# Patient Record
Sex: MALE | Race: WHITE | NOT HISPANIC OR LATINO | Employment: FULL TIME | URBAN - NONMETROPOLITAN AREA
[De-identification: names, ages, dates, MRNs, and addresses within clinical notes are randomized per-mention and may not be internally consistent; named-entity substitution may affect disease eponyms.]

---

## 2017-03-02 ENCOUNTER — APPOINTMENT (OUTPATIENT)
Dept: OCCUPATIONAL MEDICINE | Facility: OTHER | Age: 47
End: 2017-03-02

## 2017-03-02 PROCEDURE — 99199 UNLISTED SPECIAL SVC PX/RPRT: CPT

## 2017-03-02 PROCEDURE — 92552 PURE TONE AUDIOMETRY AIR: CPT

## 2020-02-26 NOTE — PROGRESS NOTES
"Subjective     Branden Becker is a 49 year old male who presents to clinic today for the following health issues:    HPI   New Patient/Transfer of Care  Pt previously seen prn only with [facility]. He has been very healthy. History of GERD on PPI for this, see below. He has no acute concerns today.     GERD/Heartburn      Duration: Chronic for many years    Description (location/character/radiation): burning, belching    Intensity:  moderate    Accompanying signs and symptoms:  food getting stuck: no   nausea/vomiting/blood: no   abdominal pain: no   black/tarry or bloody stools: no :    History (similar episodes/previous evaluation): chronic    Precipitating or alleviating factors:  worse with fatty foods and spicy foods.  current NSAID/Aspirin use: no     Therapies tried and outcome: Omeprazole (Prilosec)    Reviewed and updated as needed this visit by Provider  Tobacco  Allergies  Meds  Problems  Med Hx  Surg Hx  Fam Hx  Soc Hx          Review of Systems   ROS COMP: Constitutional, HEENT, cardiovascular, pulmonary, gi and gu systems are negative, except as otherwise noted.      Objective    /76 (BP Location: Left arm, Patient Position: Sitting, Cuff Size: Adult Large)   Pulse 65   Temp 97.7  F (36.5  C) (Tympanic)   Ht 1.778 m (5' 10\")   Wt 98.9 kg (218 lb)   SpO2 97%   BMI 31.28 kg/m    Body mass index is 31.28 kg/m .  Physical Exam   GENERAL: healthy, alert and no distress  NECK: no adenopathy, no asymmetry, masses, or scars and thyroid normal to palpation  RESP: lungs clear to auscultation - no rales, rhonchi or wheezes  CV: regular rates and rhythm, normal S1 S2, no S3 or S4, no murmur, click or rub,  no peripheral edema  ABDOMEN: soft, nontender, no hepatosplenomegaly, no masses and bowel sounds normal  MS: no gross musculoskeletal defects noted, no edema    Diagnostic Test Results:  Labs reviewed in Epic  No results found for any visits on 02/28/20.        Assessment & Plan "     Gastroesophageal reflux disease without esophagitis  Continue PPI as dosed for now. Consider dose reduction to 20mg daily. Follow up for physical.       Return in about 2 years (around 2/28/2022) for GERD, Physical.    Vanessa Hastings MD  [facility] [place]

## 2020-02-28 ENCOUNTER — OFFICE VISIT (OUTPATIENT)
Dept: FAMILY MEDICINE | Facility: OTHER | Age: 50
End: 2020-02-28
Attending: FAMILY MEDICINE
Payer: COMMERCIAL

## 2020-02-28 VITALS
DIASTOLIC BLOOD PRESSURE: 76 MMHG | OXYGEN SATURATION: 97 % | TEMPERATURE: 97.7 F | BODY MASS INDEX: 31.21 KG/M2 | HEART RATE: 65 BPM | HEIGHT: 70 IN | SYSTOLIC BLOOD PRESSURE: 122 MMHG | WEIGHT: 218 LBS

## 2020-02-28 DIAGNOSIS — K21.9 GASTROESOPHAGEAL REFLUX DISEASE WITHOUT ESOPHAGITIS: Primary | ICD-10-CM

## 2020-02-28 DIAGNOSIS — Z23 NEED FOR PROPHYLACTIC VACCINATION AND INOCULATION AGAINST INFLUENZA: ICD-10-CM

## 2020-02-28 PROBLEM — Z91.89 FRAMINGHAM CARDIAC RISK <10% IN NEXT 10 YEARS: Status: ACTIVE | Noted: 2019-03-28

## 2020-02-28 PROCEDURE — 90686 IIV4 VACC NO PRSV 0.5 ML IM: CPT | Performed by: FAMILY MEDICINE

## 2020-02-28 PROCEDURE — 99202 OFFICE O/P NEW SF 15 MIN: CPT | Mod: 25 | Performed by: FAMILY MEDICINE

## 2020-02-28 PROCEDURE — 90471 IMMUNIZATION ADMIN: CPT | Performed by: FAMILY MEDICINE

## 2020-02-28 RX ORDER — OMEPRAZOLE 40 MG/1
40 CAPSULE, DELAYED RELEASE ORAL
COMMUNITY
Start: 2019-03-28 | End: 2020-04-17

## 2020-02-28 ASSESSMENT — ANXIETY QUESTIONNAIRES
1. FEELING NERVOUS, ANXIOUS, OR ON EDGE: SEVERAL DAYS
2. NOT BEING ABLE TO STOP OR CONTROL WORRYING: NOT AT ALL
5. BEING SO RESTLESS THAT IT IS HARD TO SIT STILL: NOT AT ALL
3. WORRYING TOO MUCH ABOUT DIFFERENT THINGS: SEVERAL DAYS
GAD7 TOTAL SCORE: 3
4. TROUBLE RELAXING: NOT AT ALL
7. FEELING AFRAID AS IF SOMETHING AWFUL MIGHT HAPPEN: NOT AT ALL
6. BECOMING EASILY ANNOYED OR IRRITABLE: SEVERAL DAYS

## 2020-02-28 ASSESSMENT — PAIN SCALES - GENERAL: PAINLEVEL: NO PAIN (0)

## 2020-02-28 ASSESSMENT — PATIENT HEALTH QUESTIONNAIRE - PHQ9: SUM OF ALL RESPONSES TO PHQ QUESTIONS 1-9: 1

## 2020-02-28 ASSESSMENT — MIFFLIN-ST. JEOR: SCORE: 1865.09

## 2020-02-28 NOTE — NURSING NOTE
"Chief Complaint   Patient presents with     Establish Care       Initial /76 (BP Location: Left arm, Patient Position: Sitting, Cuff Size: Adult Large)   Pulse 65   Temp 97.7  F (36.5  C) (Tympanic)   Ht 1.778 m (5' 10\")   Wt 98.9 kg (218 lb)   SpO2 97%   BMI 31.28 kg/m   Estimated body mass index is 31.28 kg/m  as calculated from the following:    Height as of this encounter: 1.778 m (5' 10\").    Weight as of this encounter: 98.9 kg (218 lb).  Medication Reconciliation: complete  Ry Raymundo LPN  "

## 2020-02-29 ASSESSMENT — ANXIETY QUESTIONNAIRES: GAD7 TOTAL SCORE: 3

## 2020-04-17 DIAGNOSIS — K21.9 GASTROESOPHAGEAL REFLUX DISEASE WITHOUT ESOPHAGITIS: Primary | ICD-10-CM

## 2020-04-17 RX ORDER — OMEPRAZOLE 40 MG/1
40 CAPSULE, DELAYED RELEASE ORAL DAILY
Qty: 90 CAPSULE | Refills: 3 | Status: SHIPPED | OUTPATIENT
Start: 2020-04-17 | End: 2021-04-13

## 2020-04-17 NOTE — TELEPHONE ENCOUNTER
Omeprazole  Last Written Prescription Date: pt reported  Last Fill Quantity: na # of Refills: na  Last Office Visit: 2/28/20

## 2020-07-07 ENCOUNTER — HOSPITAL ENCOUNTER (EMERGENCY)
Facility: HOSPITAL | Age: 50
Discharge: HOME | End: 2020-07-08
Attending: INTERNAL MEDICINE | Admitting: INTERNAL MEDICINE
Payer: COMMERCIAL

## 2020-07-07 DIAGNOSIS — S51.812A LACERATION OF LEFT FOREARM, INITIAL ENCOUNTER: ICD-10-CM

## 2020-07-07 PROCEDURE — 25000128 H RX IP 250 OP 636: Performed by: INTERNAL MEDICINE

## 2020-07-07 PROCEDURE — 90471 IMMUNIZATION ADMIN: CPT

## 2020-07-07 PROCEDURE — 90715 TDAP VACCINE 7 YRS/> IM: CPT | Performed by: INTERNAL MEDICINE

## 2020-07-07 PROCEDURE — 12001 RPR S/N/AX/GEN/TRNK 2.5CM/<: CPT | Performed by: INTERNAL MEDICINE

## 2020-07-07 PROCEDURE — 99282 EMERGENCY DEPT VISIT SF MDM: CPT | Mod: 25

## 2020-07-07 PROCEDURE — 12001 RPR S/N/AX/GEN/TRNK 2.5CM/<: CPT

## 2020-07-07 RX ADMIN — CLOSTRIDIUM TETANI TOXOID ANTIGEN (FORMALDEHYDE INACTIVATED), CORYNEBACTERIUM DIPHTHERIAE TOXOID ANTIGEN (FORMALDEHYDE INACTIVATED), BORDETELLA PERTUSSIS TOXOID ANTIGEN (GLUTARALDEHYDE INACTIVATED), BORDETELLA PERTUSSIS FILAMENTOUS HEMAGGLUTININ ANTIGEN (FORMALDEHYDE INACTIVATED), BORDETELLA PERTUSSIS PERTACTIN ANTIGEN, AND BORDETELLA PERTUSSIS FIMBRIAE 2/3 ANTIGEN 0.5 ML: 5; 2; 2.5; 5; 3; 5 INJECTION, SUSPENSION INTRAMUSCULAR at 23:56

## 2020-07-07 NOTE — ED AVS SNAPSHOT
[facility]  [address]  [place] [address]  Phone:  [phone]                                    Branden Becker   MRN: [id number]    Department:  [facility]   Date of Visit:  7/7/2020           After Visit Summary Signature Page    I have received my discharge instructions, and my questions have been answered. I have discussed any challenges I see with this plan with the nurse or doctor.    ..........................................................................................................................................  Patient/Patient Representative Signature      ..........................................................................................................................................  Patient Representative Print Name and Relationship to Patient    ..................................................               ................................................  Date                                   Time    ..........................................................................................................................................  Reviewed by Signature/Title    ...................................................              ..............................................  Date                                               Time          22EPIC Rev 08/18

## 2020-07-08 VITALS
SYSTOLIC BLOOD PRESSURE: 144 MMHG | TEMPERATURE: 97.1 F | OXYGEN SATURATION: 97 % | DIASTOLIC BLOOD PRESSURE: 100 MMHG | RESPIRATION RATE: 16 BRPM

## 2020-07-08 ASSESSMENT — ENCOUNTER SYMPTOMS
SHORTNESS OF BREATH: 0
EYE REDNESS: 0
ARTHRALGIAS: 0
FEVER: 0
HEADACHES: 0
ABDOMINAL PAIN: 0
COLOR CHANGE: 0
CONFUSION: 0
NECK STIFFNESS: 0
DIFFICULTY URINATING: 0

## 2020-07-08 NOTE — ED NOTES
After provider places sutures, are is cleaned, covered with 4x4 and dressing. Discharge instructions gone over with patient and he states understanding. Patient is then discharged in stable condition, ambulatory, with daughter.

## 2020-07-08 NOTE — ED NOTES
States that about 45 minutes ago he was loading a  when something fell onto lower level and he reached to grab it and got laceration from knife that was sitting in utensil tray.  States minimal bleeding at home. Does have about 1 inch laceration to left forearm with no bleeding currently. 2x2 placed with coban to prevent bleeding until provider goes in to see patient.

## 2020-07-09 NOTE — ED PROVIDER NOTES
History     Chief Complaint   Patient presents with     Laceration     lt lower arm laceration     The history is provided by the patient.   Laceration   Location:  Shoulder/arm  Shoulder/arm laceration location:  L forearm  Depth:  Through dermis  Laceration mechanism:  Knife  Pain details:     Quality:  Aching    Severity:  Mild  Tetanus status:  Out of date  Associated symptoms: no fever          Allergies:  No Known Allergies    Problem List:    Patient Active Problem List    Diagnosis Date Noted     [place] cardiac risk <10% in next 10 years 2019     Priority: Medium     3/28/19:  Age 48; smoking: no; diabetes: no; hypertension: no; systolic blood pressure:  110; date of lipid:  17; HDL:  44; total cholesterol:  174; statin: no; RISK:  2%.       Ankle pain 2015     Priority: Medium     Tenosynovitis 10/31/2012     Priority: Medium     Gastroesophageal reflux disease 2007     Priority: Medium        Past Medical History:    Past Medical History:   Diagnosis Date     GERD (gastroesophageal reflux disease)        Past Surgical History:    No past surgical history on file.    Family History:    Family History   Problem Relation Age of Onset     Obesity Mother        Social History:  Marital Status:   [2]  Social History     Tobacco Use     Smoking status: Former Smoker     Types: Cigarettes     Last attempt to quit: 2006     Years since quittin.5     Smokeless tobacco: Never Used   Substance Use Topics     Alcohol use: Yes     Comment: a case a year at the most.      Drug use: Never        Medications:    omeprazole (PRILOSEC) 40 MG DR capsule          Review of Systems   Constitutional: Negative for fever.   HENT: Negative for congestion.    Eyes: Negative for redness.   Respiratory: Negative for shortness of breath.    Cardiovascular: Negative for chest pain.   Gastrointestinal: Negative for abdominal pain.   Genitourinary: Negative for difficulty urinating.    Musculoskeletal: Negative for arthralgias and neck stiffness.   Skin: Negative for color change.   Neurological: Negative for headaches.   Psychiatric/Behavioral: Negative for confusion.       Physical Exam   BP: 144/100  Heart Rate: 80  Temp: 97.1  F (36.2  C)  Resp: 16  SpO2: 98 %      Physical Exam  Constitutional:       General: He is not in acute distress.     Appearance: He is not diaphoretic.   HENT:      Head: Atraumatic.   Eyes:      General: No scleral icterus.     Pupils: Pupils are equal, round, and reactive to light.   Cardiovascular:      Heart sounds: Normal heart sounds.   Pulmonary:      Effort: No respiratory distress.      Breath sounds: Normal breath sounds.   Abdominal:      General: Bowel sounds are normal.      Palpations: Abdomen is soft.      Tenderness: There is no abdominal tenderness.   Musculoskeletal:         General: No tenderness.      Left elbow: He exhibits normal range of motion, no swelling and no deformity.      Left forearm: He exhibits laceration. He exhibits no tenderness, no bony tenderness, no swelling, no edema and no deformity.      Left hand: He exhibits normal range of motion, no tenderness, no bony tenderness, normal capillary refill, no deformity and no laceration. Normal sensation noted. Decreased sensation is not present in the ulnar distribution, is not present in the medial redistribution and is not present in the radial distribution. Normal strength noted. He exhibits no finger abduction, no thumb/finger opposition and no wrist extension trouble.      Comments: 1 cm laceration proximal lateral of left forearm, no bleeding  Pt able to move all left fingers in all direction, denies any sensory problem in left arm or hand  Left radial pulse 2+   Skin:     General: Skin is warm.      Findings: No rash.         ED Course        [facility]    -Laceration Repair    Date/Time: 7/8/2020 9:00 PM  Performed by: Scott Garcia MD  Authorized by: Scott Garcia MD      LACERATION DETAILS     Location:  Shoulder/arm    Shoulder/arm location:  L lower arm    Length (cm):  1    Depth (mm):  5    REPAIR TYPE:     Repair type:  Simple      EXPLORATION:     Wound exploration: wound explored through full range of motion and entire depth of wound probed and visualized      TREATMENT:     Area cleansed with:  Betadine and Hibiclens    Amount of cleaning:  Standard    Irrigation solution:  Sterile water    Irrigation method:  Syringe    SKIN REPAIR     Repair method:  Sutures    Suture size:  3-0    Suture material:  Nylon    Suture technique:  Simple interrupted    Number of sutures:  3    APPROXIMATION     Approximation:  Close    POST-PROCEDURE DETAILS     Dressing:  Sterile dressing      PROCEDURE   Patient Tolerance:  Patient tolerated the procedure well with no immediate complications                         No results found for this or any previous visit (from the past 24 hour(s)).    Medications   Tdap (tetanus-diphtheria-acell pertussis) (ADACEL) injection 0.5 mL (0.5 mLs Intramuscular Given 7/7/20 3225)       Assessments & Plan (with Medical Decision Making)   Left forearm laceration  Repaired  Suture removal in 1 wk  Follow-up with PCP  I have reviewed the nursing notes.    I have reviewed the findings, diagnosis, plan and need for follow up with the patient.      Discharge Medication List as of 7/8/2020 12:44 AM          Final diagnoses:   Laceration of left forearm, initial encounter       7/7/2020   [facility]     Scott Garcia MD  07/08/20 2069

## 2020-11-09 ENCOUNTER — TELEPHONE (OUTPATIENT)
Dept: FAMILY MEDICINE | Facility: OTHER | Age: 50
End: 2020-11-09

## 2020-11-09 NOTE — TELEPHONE ENCOUNTER
9:11 AM    Reason for Call: OVERBOOK    Patient is having the following symptoms: CONGESTION , BODY ACHES , NAUSEA for 1 days.    The patient is requesting an appointment for ASAP with DR. HWANG.    Was an appointment offered for this call? No  If yes : Appointment type              Date    Preferred method for responding to this message: Telephone Call  What is your phone number ?[phone] - pt preferred to get a call back instead of waiting on hold    If we cannot reach you directly, may we leave a detailed response at the number you provided? Yes    Can this message wait until your PCP/provider returns, if unavailable today? Not applicable    Juliane Mejia

## 2020-11-26 ENCOUNTER — HOSPITAL ENCOUNTER (EMERGENCY)
Facility: HOSPITAL | Age: 50
Discharge: HOME | End: 2020-11-26
Attending: NURSE PRACTITIONER | Admitting: NURSE PRACTITIONER
Payer: COMMERCIAL

## 2020-11-26 ENCOUNTER — APPOINTMENT (OUTPATIENT)
Dept: CT IMAGING | Facility: HOSPITAL | Age: 50
End: 2020-11-26
Attending: NURSE PRACTITIONER
Payer: COMMERCIAL

## 2020-11-26 VITALS
HEIGHT: 69 IN | RESPIRATION RATE: 18 BRPM | TEMPERATURE: 98.2 F | WEIGHT: 210 LBS | DIASTOLIC BLOOD PRESSURE: 81 MMHG | OXYGEN SATURATION: 96 % | SYSTOLIC BLOOD PRESSURE: 117 MMHG | HEART RATE: 71 BPM | BODY MASS INDEX: 31.1 KG/M2

## 2020-11-26 DIAGNOSIS — D72.829 LEUKOCYTOSIS: ICD-10-CM

## 2020-11-26 DIAGNOSIS — R91.1 INCIDENTAL PULMONARY NODULE, > 3MM AND < 8MM: ICD-10-CM

## 2020-11-26 DIAGNOSIS — N20.1 RIGHT DISTAL URETERAL CALCULUS: Primary | ICD-10-CM

## 2020-11-26 DIAGNOSIS — D72.820 LYMPHOCYTOSIS: ICD-10-CM

## 2020-11-26 LAB
ALBUMIN SERPL-MCNC: 3.9 G/DL (ref 3.4–5)
ALBUMIN UR-MCNC: 10 MG/DL
ALP SERPL-CCNC: 68 U/L (ref 40–150)
ALT SERPL W P-5'-P-CCNC: 41 U/L (ref 0–70)
ANION GAP SERPL CALCULATED.3IONS-SCNC: 4 MMOL/L (ref 3–14)
APPEARANCE UR: CLEAR
AST SERPL W P-5'-P-CCNC: 19 U/L (ref 0–45)
BACTERIA #/AREA URNS HPF: ABNORMAL /HPF
BASOPHILS # BLD AUTO: 0 10E9/L (ref 0–0.2)
BASOPHILS # BLD AUTO: 0 10E9/L (ref 0–0.2)
BASOPHILS NFR BLD AUTO: 0 %
BASOPHILS NFR BLD AUTO: 0 %
BILIRUB SERPL-MCNC: 0.5 MG/DL (ref 0.2–1.3)
BILIRUB UR QL STRIP: NEGATIVE
BUN SERPL-MCNC: 19 MG/DL (ref 7–30)
CALCIUM SERPL-MCNC: 8.3 MG/DL (ref 8.5–10.1)
CHLORIDE SERPL-SCNC: 108 MMOL/L (ref 94–109)
CO2 SERPL-SCNC: 27 MMOL/L (ref 20–32)
COLOR UR AUTO: ABNORMAL
CREAT SERPL-MCNC: 1.06 MG/DL (ref 0.66–1.25)
CRP SERPL-MCNC: <2.9 MG/L (ref 0–8)
DIFFERENTIAL METHOD BLD: ABNORMAL
DIFFERENTIAL METHOD BLD: ABNORMAL
EOSINOPHIL # BLD AUTO: 0 10E9/L (ref 0–0.7)
EOSINOPHIL # BLD AUTO: 0.2 10E9/L (ref 0–0.7)
EOSINOPHIL NFR BLD AUTO: 0 %
EOSINOPHIL NFR BLD AUTO: 1 %
ERYTHROCYTE [DISTWIDTH] IN BLOOD BY AUTOMATED COUNT: 13.1 % (ref 10–15)
ERYTHROCYTE [DISTWIDTH] IN BLOOD BY AUTOMATED COUNT: 13.2 % (ref 10–15)
ERYTHROCYTE [SEDIMENTATION RATE] IN BLOOD BY WESTERGREN METHOD: 5 MM/H (ref 0–15)
GFR SERPL CREATININE-BSD FRML MDRD: 82 ML/MIN/{1.73_M2}
GLUCOSE SERPL-MCNC: 126 MG/DL (ref 70–99)
GLUCOSE UR STRIP-MCNC: NEGATIVE MG/DL
HCT VFR BLD AUTO: 41.8 % (ref 40–53)
HCT VFR BLD AUTO: 46.3 % (ref 40–53)
HGB BLD-MCNC: 14.3 G/DL (ref 13.3–17.7)
HGB BLD-MCNC: 15.6 G/DL (ref 13.3–17.7)
HGB UR QL STRIP: ABNORMAL
KETONES UR STRIP-MCNC: NEGATIVE MG/DL
LACTATE BLD-SCNC: 1.7 MMOL/L (ref 0.7–2)
LEUKOCYTE ESTERASE UR QL STRIP: NEGATIVE
LYMPHOCYTES # BLD AUTO: 10.4 10E9/L (ref 0.8–5.3)
LYMPHOCYTES # BLD AUTO: 11.9 10E9/L (ref 0.8–5.3)
LYMPHOCYTES NFR BLD AUTO: 46 %
LYMPHOCYTES NFR BLD AUTO: 57 %
MCH RBC QN AUTO: 29.6 PG (ref 26.5–33)
MCH RBC QN AUTO: 30.4 PG (ref 26.5–33)
MCHC RBC AUTO-ENTMCNC: 33.7 G/DL (ref 31.5–36.5)
MCHC RBC AUTO-ENTMCNC: 34.2 G/DL (ref 31.5–36.5)
MCV RBC AUTO: 88 FL (ref 78–100)
MCV RBC AUTO: 89 FL (ref 78–100)
MONOCYTES # BLD AUTO: 0.9 10E9/L (ref 0–1.3)
MONOCYTES # BLD AUTO: 1 10E9/L (ref 0–1.3)
MONOCYTES NFR BLD AUTO: 4 %
MONOCYTES NFR BLD AUTO: 5 %
MUCOUS THREADS #/AREA URNS LPF: PRESENT /LPF
NEUTROPHILS # BLD AUTO: 11.4 10E9/L (ref 1.6–8.3)
NEUTROPHILS # BLD AUTO: 7.7 10E9/L (ref 1.6–8.3)
NEUTROPHILS NFR BLD AUTO: 37 %
NEUTROPHILS NFR BLD AUTO: 50 %
NITRATE UR QL: NEGATIVE
PH UR STRIP: 5.5 PH (ref 4.7–8)
PLATELET # BLD AUTO: 214 10E9/L (ref 150–450)
PLATELET # BLD AUTO: 241 10E9/L (ref 150–450)
POTASSIUM SERPL-SCNC: 3.6 MMOL/L (ref 3.4–5.3)
PROCALCITONIN SERPL-MCNC: <0.05 NG/ML
PROT SERPL-MCNC: 7.5 G/DL (ref 6.8–8.8)
RBC # BLD AUTO: 4.7 10E12/L (ref 4.4–5.9)
RBC # BLD AUTO: 5.27 10E12/L (ref 4.4–5.9)
RBC #/AREA URNS AUTO: >182 /HPF (ref 0–2)
SODIUM SERPL-SCNC: 139 MMOL/L (ref 133–144)
SOURCE: ABNORMAL
SP GR UR STRIP: 1.01 (ref 1–1.03)
SQUAMOUS #/AREA URNS AUTO: 0 /HPF (ref 0–1)
UROBILINOGEN UR STRIP-MCNC: NORMAL MG/DL (ref 0–2)
VARIANT LYMPHS BLD QL SMEAR: PRESENT
WBC # BLD AUTO: 20.9 10E9/L (ref 4–11)
WBC # BLD AUTO: 22.7 10E9/L (ref 4–11)
WBC #/AREA URNS AUTO: <1 /HPF (ref 0–5)

## 2020-11-26 PROCEDURE — 86140 C-REACTIVE PROTEIN: CPT | Performed by: NURSE PRACTITIONER

## 2020-11-26 PROCEDURE — 36415 COLL VENOUS BLD VENIPUNCTURE: CPT | Performed by: NURSE PRACTITIONER

## 2020-11-26 PROCEDURE — 999N001109 HC STATISTIC MORPHOLOGY W/INTERP HISTOLOGY TC 85060: Performed by: NURSE PRACTITIONER

## 2020-11-26 PROCEDURE — 258N000003 HC RX IP 258 OP 636: Performed by: NURSE PRACTITIONER

## 2020-11-26 PROCEDURE — 81001 URINALYSIS AUTO W/SCOPE: CPT | Performed by: NURSE PRACTITIONER

## 2020-11-26 PROCEDURE — 255N000002 HC RX 255 OP 636: Performed by: NURSE PRACTITIONER

## 2020-11-26 PROCEDURE — 74177 CT ABD & PELVIS W/CONTRAST: CPT

## 2020-11-26 PROCEDURE — 96375 TX/PRO/DX INJ NEW DRUG ADDON: CPT

## 2020-11-26 PROCEDURE — 85652 RBC SED RATE AUTOMATED: CPT | Performed by: NURSE PRACTITIONER

## 2020-11-26 PROCEDURE — 85025 COMPLETE CBC W/AUTO DIFF WBC: CPT | Performed by: NURSE PRACTITIONER

## 2020-11-26 PROCEDURE — 250N000013 HC RX MED GY IP 250 OP 250 PS 637: Performed by: NURSE PRACTITIONER

## 2020-11-26 PROCEDURE — 83605 ASSAY OF LACTIC ACID: CPT | Performed by: NURSE PRACTITIONER

## 2020-11-26 PROCEDURE — 250N000011 HC RX IP 250 OP 636: Performed by: NURSE PRACTITIONER

## 2020-11-26 PROCEDURE — 84145 PROCALCITONIN (PCT): CPT | Performed by: NURSE PRACTITIONER

## 2020-11-26 PROCEDURE — 96365 THER/PROPH/DIAG IV INF INIT: CPT | Mod: XU

## 2020-11-26 PROCEDURE — 80053 COMPREHEN METABOLIC PANEL: CPT | Performed by: NURSE PRACTITIONER

## 2020-11-26 PROCEDURE — 99285 EMERGENCY DEPT VISIT HI MDM: CPT | Mod: 25

## 2020-11-26 PROCEDURE — 99284 EMERGENCY DEPT VISIT MOD MDM: CPT | Performed by: NURSE PRACTITIONER

## 2020-11-26 RX ORDER — TAMSULOSIN HYDROCHLORIDE 0.4 MG/1
0.4 CAPSULE ORAL DAILY
Status: DISCONTINUED | OUTPATIENT
Start: 2020-11-26 | End: 2020-11-27 | Stop reason: HOSPADM

## 2020-11-26 RX ORDER — TAMSULOSIN HYDROCHLORIDE 0.4 MG/1
0.4 CAPSULE ORAL DAILY
Qty: 10 CAPSULE | Refills: 0 | Status: SHIPPED | OUTPATIENT
Start: 2020-11-26 | End: 2020-11-26

## 2020-11-26 RX ORDER — IOPAMIDOL 612 MG/ML
100 INJECTION, SOLUTION INTRAVASCULAR ONCE
Status: COMPLETED | OUTPATIENT
Start: 2020-11-26 | End: 2020-11-26

## 2020-11-26 RX ORDER — KETOROLAC TROMETHAMINE 30 MG/ML
30 INJECTION, SOLUTION INTRAMUSCULAR; INTRAVENOUS ONCE
Status: COMPLETED | OUTPATIENT
Start: 2020-11-26 | End: 2020-11-26

## 2020-11-26 RX ORDER — ONDANSETRON 2 MG/ML
4 INJECTION INTRAMUSCULAR; INTRAVENOUS EVERY 30 MIN PRN
Status: DISCONTINUED | OUTPATIENT
Start: 2020-11-26 | End: 2020-11-27 | Stop reason: HOSPADM

## 2020-11-26 RX ORDER — ONDANSETRON 4 MG/1
4 TABLET, ORALLY DISINTEGRATING ORAL EVERY 8 HOURS PRN
Qty: 10 TABLET | Refills: 0 | Status: SHIPPED | OUTPATIENT
Start: 2020-11-26 | End: 2020-12-02

## 2020-11-26 RX ORDER — CEFTRIAXONE SODIUM 1 G/50ML
1 INJECTION, SOLUTION INTRAVENOUS ONCE
Status: COMPLETED | OUTPATIENT
Start: 2020-11-26 | End: 2020-11-26

## 2020-11-26 RX ORDER — ONDANSETRON 4 MG/1
4 TABLET, ORALLY DISINTEGRATING ORAL EVERY 8 HOURS PRN
Qty: 10 TABLET | Refills: 0 | Status: SHIPPED | OUTPATIENT
Start: 2020-11-26 | End: 2020-11-26

## 2020-11-26 RX ORDER — TAMSULOSIN HYDROCHLORIDE 0.4 MG/1
0.4 CAPSULE ORAL DAILY
Qty: 10 CAPSULE | Refills: 0 | Status: SHIPPED | OUTPATIENT
Start: 2020-11-26 | End: 2020-12-02

## 2020-11-26 RX ADMIN — KETOROLAC TROMETHAMINE 30 MG: 30 INJECTION, SOLUTION INTRAMUSCULAR at 21:28

## 2020-11-26 RX ADMIN — SODIUM CHLORIDE 1000 ML: 9 INJECTION, SOLUTION INTRAVENOUS at 21:28

## 2020-11-26 RX ADMIN — IOPAMIDOL 100 ML: 612 INJECTION, SOLUTION INTRAVENOUS at 20:25

## 2020-11-26 RX ADMIN — ONDANSETRON 4 MG: 2 INJECTION INTRAMUSCULAR; INTRAVENOUS at 19:42

## 2020-11-26 RX ADMIN — TAMSULOSIN HYDROCHLORIDE 0.4 MG: 0.4 CAPSULE ORAL at 21:31

## 2020-11-26 RX ADMIN — CEFTRIAXONE SODIUM 1 G: 1 INJECTION, SOLUTION INTRAVENOUS at 21:28

## 2020-11-26 ASSESSMENT — ENCOUNTER SYMPTOMS
FEVER: 0
SORE THROAT: 0
ABDOMINAL PAIN: 1
DIARRHEA: 0
HEADACHES: 0
DIFFICULTY URINATING: 0
BLOOD IN STOOL: 0
CONSTIPATION: 0
BACK PAIN: 0
COUGH: 0
VOMITING: 0
PALPITATIONS: 0
DYSURIA: 0
NAUSEA: 1
RHINORRHEA: 0
FREQUENCY: 0
SHORTNESS OF BREATH: 0
CHILLS: 0

## 2020-11-26 ASSESSMENT — MIFFLIN-ST. JEOR: SCORE: 1807.93

## 2020-11-26 NOTE — ED AVS SNAPSHOT
[facility]  [address]  [place] [address]  Phone: [phone]                                    Branden Becker   MRN: [id number]    Department: [facility]   Date of Visit: 11/26/2020           After Visit Summary Signature Page    I have received my discharge instructions, and my questions have been answered. I have discussed any challenges I see with this plan with the nurse or doctor.    ..........................................................................................................................................  Patient/Patient Representative Signature      ..........................................................................................................................................  Patient Representative Print Name and Relationship to Patient    ..................................................               ................................................  Date                                   Time    ..........................................................................................................................................  Reviewed by Signature/Title    ...................................................              ..............................................  Date                                               Time          22EPIC Rev 08/18

## 2020-11-27 ENCOUNTER — TELEPHONE (OUTPATIENT)
Dept: FAMILY MEDICINE | Facility: OTHER | Age: 50
End: 2020-11-27

## 2020-11-27 ENCOUNTER — HOSPITAL ENCOUNTER (EMERGENCY)
Facility: HOSPITAL | Age: 50
Discharge: HOME | End: 2020-11-27
Attending: EMERGENCY MEDICINE | Admitting: EMERGENCY MEDICINE
Payer: COMMERCIAL

## 2020-11-27 VITALS
DIASTOLIC BLOOD PRESSURE: 92 MMHG | RESPIRATION RATE: 16 BRPM | SYSTOLIC BLOOD PRESSURE: 124 MMHG | TEMPERATURE: 97.9 F | OXYGEN SATURATION: 96 % | HEART RATE: 80 BPM

## 2020-11-27 DIAGNOSIS — R91.8 PULMONARY NODULES: ICD-10-CM

## 2020-11-27 DIAGNOSIS — N23 RENAL COLIC: ICD-10-CM

## 2020-11-27 DIAGNOSIS — N20.1 URETERAL CALCULUS: ICD-10-CM

## 2020-11-27 DIAGNOSIS — D72.820 LYMPHOCYTOSIS: ICD-10-CM

## 2020-11-27 DIAGNOSIS — R93.89 ABNORMAL CT SCAN: ICD-10-CM

## 2020-11-27 PROCEDURE — 99283 EMERGENCY DEPT VISIT LOW MDM: CPT | Performed by: EMERGENCY MEDICINE

## 2020-11-27 PROCEDURE — 99283 EMERGENCY DEPT VISIT LOW MDM: CPT

## 2020-11-27 PROCEDURE — 250N000013 HC RX MED GY IP 250 OP 250 PS 637: Performed by: EMERGENCY MEDICINE

## 2020-11-27 PROCEDURE — 250N000011 HC RX IP 250 OP 636: Performed by: EMERGENCY MEDICINE

## 2020-11-27 RX ORDER — OXYCODONE HYDROCHLORIDE 5 MG/1
5 TABLET ORAL EVERY 6 HOURS PRN
Qty: 12 TABLET | Refills: 0 | Status: SHIPPED | OUTPATIENT
Start: 2020-11-27 | End: 2020-12-02

## 2020-11-27 RX ORDER — DOCUSATE SODIUM 100 MG/1
100 CAPSULE, LIQUID FILLED ORAL 2 TIMES DAILY
Qty: 20 CAPSULE | Refills: 0 | Status: SHIPPED | OUTPATIENT
Start: 2020-11-27 | End: 2020-12-02

## 2020-11-27 RX ORDER — OXYCODONE HYDROCHLORIDE 5 MG/1
10 TABLET ORAL ONCE
Status: COMPLETED | OUTPATIENT
Start: 2020-11-27 | End: 2020-11-27

## 2020-11-27 RX ORDER — ONDANSETRON 4 MG/1
4 TABLET, ORALLY DISINTEGRATING ORAL ONCE
Status: COMPLETED | OUTPATIENT
Start: 2020-11-27 | End: 2020-11-27

## 2020-11-27 RX ADMIN — ONDANSETRON 4 MG: 4 TABLET, ORALLY DISINTEGRATING ORAL at 14:41

## 2020-11-27 RX ADMIN — OXYCODONE HYDROCHLORIDE 10 MG: 5 TABLET ORAL at 14:42

## 2020-11-27 NOTE — DISCHARGE INSTRUCTIONS
"(N20.1) Right distal ureteral calculus  (primary encounter diagnosis)  (R91.1) Incidental pulmonary nodule, > 3mm and < 8mm  (D72.829) Leukocytosis  (D72.820) Lymphocytosis  Nontoxic appearing 50-year old male in some distress due to right sided abdominal pain that feels like \"kicked really hard in the genital.\" Work-up as above - abdominal CT shows a 3 mm right ureteral stone with mild hydronephrosis, urinalysis is negative for infection, abdominal CT also shows an incidental 6 mm pulmonary nodule. Given his history of being a former smoker he should follow-up with primary care provider on this. He has elevated WBC and lymphocyte count - no known source of infection. He is afebrile, normal lactic acid, no intra-abdominal findings on CT- appendix and bowel are normal, no respiratory symptoms, urinalysis is normal. Repeat CBC after IV fluids shows elevated WBC and lymphocytes again - lab called with plan to have reviewed by pathology and patient should follow-up on this with primary care provider. Plan to manage pain and passage of stone with flomax, NSAIDs/acetaminophen, zofran for nausea.  Recommend:  - Ensure you are drinking plenty of fluids  - ibuprofen (Advil) 800 mg with food every 8 hours  - acetaminophen (Tylenol) 1,000 mg every 6-8 hours  *Alternate ibuprofen and acetaminophen. For example: 8 am ibuprofen, 12 pm acetaminophen, 4 pm ibuprofen, 8 pm acetaminophen, etc*  - Use ondansetron (Zofran) as directed for nausea  - Use hydrocodone as directed for pain not controlled by above.  - Strain urine   - START tamsulosin (Flomax) as directed to help pass stone.      RETURN TO THE ED WITH NEW OR WORSENING SYMPTOMS INCLUDING BUT NOT LIMITED TO FEVER, VOMITING, INCREASED PAIN.    ED FOLLOW-UP WITH YOUR PRIMARY CARE PROVIDER IN 3-5 DAYS.      Lakia Kelly CNP    Results for orders placed or performed during the hospital encounter of 11/26/20   CBC with platelets differential     Status: Abnormal   Result Value " Ref [facility]    WBC 20.9 (H) 4.0 - 11.0 10e9/L    RBC Count 5.27 4.4 - 5.9 10e12/L    Hemoglobin 15.6 13.3 - 17.7 g/dL    Hematocrit 46.3 40.0 - 53.0 %    MCV 88 78 - 100 fl    MCH 29.6 26.5 - 33.0 pg    MCHC 33.7 31.5 - 36.5 g/dL    RDW 13.2 10.0 - 15.0 %    Platelet Count 241 150 - 450 10e9/L    Diff Method Manual Differential     % Neutrophils 37.0 %    % Lymphocytes 57.0 %    % Monocytes 5.0 %    % Eosinophils 1.0 %    % Basophils 0.0 %    Absolute Neutrophil 7.7 1.6 - 8.3 10e9/L    Absolute Lymphocytes 11.9 (H) 0.8 - 5.3 10e9/L    Absolute Monocytes 1.0 0.0 - 1.3 10e9/L    Absolute Eosinophils 0.2 0.0 - 0.7 10e9/L    Absolute Basophils 0.0 0.0 - 0.2 10e9/L   Comprehensive metabolic panel     Status: Abnormal   Result Value Ref [facility]    Sodium 139 133 - 144 mmol/L    Potassium 3.6 3.4 - 5.3 mmol/L    Chloride 108 94 - 109 mmol/L    Carbon Dioxide 27 20 - 32 mmol/L    Anion Gap 4 3 - 14 mmol/L    Glucose 126 (H) 70 - 99 mg/dL    Urea Nitrogen 19 7 - 30 mg/dL    Creatinine 1.06 0.66 - 1.25 mg/dL    GFR Estimate 82 >60 mL/min/[1.73_m2]    GFR Estimate If Black >90 >60 mL/min/[1.73_m2]    Calcium 8.3 (L) 8.5 - 10.1 mg/dL    Bilirubin Total 0.5 0.2 - 1.3 mg/dL    Albumin 3.9 3.4 - 5.0 g/dL    Protein Total 7.5 6.8 - 8.8 g/dL    Alkaline Phosphatase 68 40 - 150 U/L    ALT 41 0 - 70 U/L    AST 19 0 - 45 U/L   Lactic acid whole blood     Status: None   Result Value Ref [facility]    Lactic Acid 1.7 0.7 - 2.0 mmol/L   UA reflex to Microscopic and Culture     Status: Abnormal    Specimen: Midstream Urine   Result Value Ref [facility]    Color Urine Light Yellow     Appearance Urine Clear     Glucose Urine Negative NEG^Negative mg/dL    Bilirubin Urine Negative NEG^Negative    Ketones Urine Negative NEG^Negative mg/dL    Specific Gravity Urine 1.015 1.003 - 1.035    Blood Urine Large (A) NEG^Negative    pH Urine 5.5 4.7 - 8.0 pH    Protein Albumin Urine 10 (A) NEG^Negative mg/dL    Urobilinogen mg/dL Normal 0.0 - 2.0 mg/dL    Nitrite  Urine Negative NEG^Negative    Leukocyte Esterase Urine Negative NEG^Negative    Source Midstream Urine     RBC Urine >182 (H) 0 - 2 /HPF    WBC Urine <1 0 - 5 /HPF    Bacteria Urine None (A) NEG^Negative /HPF    Squamous Epithelial /HPF Urine 0 0 - 1 /HPF    Mucous Urine Present (A) NEG^Negative /LPF   CRP inflammation     Status: None   Result Value Ref [facility]    CRP Inflammation <2.9 0.0 - 8.0 mg/L   CBC with platelets differential     Status: Abnormal   Result Value Ref [facility]    WBC 22.7 (H) 4.0 - 11.0 10e9/L    RBC Count 4.70 4.4 - 5.9 10e12/L    Hemoglobin 14.3 13.3 - 17.7 g/dL    Hematocrit 41.8 40.0 - 53.0 %    MCV 89 78 - 100 fl    MCH 30.4 26.5 - 33.0 pg    MCHC 34.2 31.5 - 36.5 g/dL    RDW 13.1 10.0 - 15.0 %    Platelet Count 214 150 - 450 10e9/L    Diff Method Manual Differential     % Neutrophils 50.0 %    % Lymphocytes 46.0 %    % Monocytes 4.0 %    % Eosinophils 0.0 %    % Basophils 0.0 %    Absolute Neutrophil 11.4 (H) 1.6 - 8.3 10e9/L    Absolute Lymphocytes 10.4 (H) 0.8 - 5.3 10e9/L    Absolute Monocytes 0.9 0.0 - 1.3 10e9/L    Absolute Eosinophils 0.0 0.0 - 0.7 10e9/L    Absolute Basophils 0.0 0.0 - 0.2 10e9/L    Reactive Lymphs Present            What to expect when you have contrast    During your exam, we will inject  contrast  into your vein or artery. (Contrast is a clear liquid with iodine in it. It shows up on X-rays.)    You may feel warm or hot. You may have a metal taste in your mouth and a slight upset stomach. You may also feel pressure near the kidneys and bladder. These effects will last about 1 to 3 minutes.    Please tell us if you have:    Sneezing     Itching    Hives     Swelling in the face    A hoarse voice    Breathing problems    Other new symptoms    Serious problems are rare.  They may include:    Irregular heartbeat     Seizures    Kidney failure              Tissue damage    Shock      Death    If you have any problems during the exam, we  will treat them right  away.    When you get home    Call your hospital if you have any new symptoms in the next 2 days, like hives or swelling. (Phone numbers are at the bottom of this page.) Or call your family doctor.     If you have wheezing or trouble breathing, call 911.    Self-care  -Drink at least 4 extra glasses of water today.   This reduces the stress on your kidneys.  -Keep taking your regular medicines.    The contrast will pass out of your body in your  Urine(pee). This will happen in the next 24 hours. You  will not feel this. Your urine will not  change color.    If you have kidney problems or take metformin    Drink 4 to 8 large glasses of water for the next  2 days, if you are not on a fluid restriction.    ?If you take metformin (Glucophage or Glucovance) for diabetes, keep taking it.      ?Your kidney function tests are abnormal.  If you take Metformin, do not take it for 48 hours. Please go to your clinic for a blood test within 3 days after your exam before the restarting this medicine.     (Note to provider:please give patient prescription for lab tests.)    ?Special instructions:     I have read and understand the above information.    Patient Sign Here:______________________________________Date:________Time:______    Staff Sign Here:________________________________________Date:_______Time:______      Radiology Departments:     ?[facility]: [phone] ?[facility]: [phone]     ?[facility]: [phone] ?[facility]:[phone]      ?[facility]: [phone]  ?[facility]: [phone]  ?[facility]:[phone]    ?[facility]:[phone]  ?[facility]:[phone]

## 2020-11-27 NOTE — ED TRIAGE NOTES
"Patient presents to emergency room with c/o RLQ abdominal pain. Sudden onset after waking up from a nap this afternoon. C/o pain radiating down right leg. \"The pain makes my right leg shake and twitch.\" Denies fever or chills. BM x 2 prior to coming to ED. Nauseous.   "

## 2020-11-27 NOTE — ED PROVIDER NOTES
"  History     Chief Complaint   Patient presents with     Abdominal Pain     RLQ abdominal pain. nausea     HPI     Branden Esha Becker is a 50 year old male who presents ambulatory for evaluation of RLQ pain that started this evening after his nap. He tried to have a bowel movement and bearing down increased pain. He was able to have a bowel movement - denies diarrhea, constipation, hematochezia, melena. Denies urinary symptoms dysuria, urinary frequency, urinary retention. Denies feverish or chilled feeling. He is feeling intermittent nausea but no vomiting. Currently 8/10 \"it feels the same as if you got kicked really hard in the genital. I've never felt this pain except for when that has happened.\" Denies history of hernias, testicular pain or swelling. He has never had any abdominal surgeries. Has never had colonoscopy.     Last ate at 2 p.m. - turkey, mashed potatoes    No symptoms of recent illness. No known COVID exposures.       Allergies:  No Known Allergies    Problem List:    Patient Active Problem List    Diagnosis Date Noted     Charlo cardiac risk <10% in next 10 years 03/28/2019     Priority: Medium     3/28/19:  Age 48; smoking: no; diabetes: no; hypertension: no; systolic blood pressure:  110; date of lipid:  11/21/17; HDL:  44; total cholesterol:  174; statin: no; RISK:  2%.       Ankle pain 04/02/2015     Priority: Medium     Tenosynovitis 10/31/2012     Priority: Medium     Gastroesophageal reflux disease 11/27/2007     Priority: Medium        Past Medical History:    Past Medical History:   Diagnosis Date     GERD (gastroesophageal reflux disease)        Past Surgical History:    No past surgical history on file.    Family History:    Family History   Problem Relation Age of Onset     Obesity Mother        Social History:  Marital Status:   [2]  Social History     Tobacco Use     Smoking status: Former Smoker     Types: Cigarettes     Quit date: 1/1/2006     Years since quitting: " "14.9     Smokeless tobacco: Never Used   Substance Use Topics     Alcohol use: Yes     Comment: a case a year at the most.      Drug use: Never        Medications:         omeprazole (PRILOSEC) 40 MG DR capsule       ondansetron (ZOFRAN ODT) 4 MG ODT tab       tamsulosin (FLOMAX) 0.4 MG capsule          Review of Systems   Constitutional: Negative for chills and fever.   HENT: Negative for congestion, ear pain, rhinorrhea and sore throat.    Respiratory: Negative for cough and shortness of breath.    Cardiovascular: Negative for chest pain and palpitations.   Gastrointestinal: Positive for abdominal pain and nausea. Negative for blood in stool, constipation, diarrhea and vomiting.   Genitourinary: Negative for difficulty urinating, dysuria and frequency.   Musculoskeletal: Negative for back pain.        Negative for generalized body aches   Neurological: Negative for headaches.       Physical Exam   BP: (!) 153/117  Pulse: 79  Temp: 99.4  F (37.4  C)  Resp: 18  Height: 175.3 cm (5' 9\")  Weight: 95.3 kg (210 lb)  SpO2: 100 %      Physical Exam  Constitutional:       Appearance: He is not ill-appearing or toxic-appearing.   Cardiovascular:      Rate and Rhythm: Normal rate and regular rhythm.      Heart sounds: S1 normal and S2 normal. No murmur. No friction rub. No gallop.    Pulmonary:      Effort: Pulmonary effort is normal.      Breath sounds: Normal breath sounds. No wheezing, rhonchi or rales.   Abdominal:      General: Bowel sounds are normal. There is no distension.      Palpations: Abdomen is soft.      Tenderness: There is abdominal tenderness in the right lower quadrant. There is guarding. There is no right CVA tenderness, left CVA tenderness or rebound.   Musculoskeletal:      Comments: FROM of upper and lower extremities   Skin:     General: Skin is warm and dry.      Capillary Refill: Capillary refill takes less than 2 seconds.      Coloration: Skin is not pale.   Neurological:      Mental Status: He is " alert and oriented to person, place, and time.      Gait: Gait is intact.   Psychiatric:         Mood and Affect: Mood normal.         Speech: Speech normal.         Behavior: Behavior normal. Behavior is cooperative.         ED Course     ED Course as of Nov 26 2306   Thu Nov 26, 2020   2246 Lab called and are sending to pathology to review lymphocytes.  Lakia Kelly CNP on 11/26/2020 at 10:46 PM     CBC with platelets differential(!)   2305 Patient re-evaluated. Pain 0/10. Declines RX for pain medications and will do acetaminophen and ibuprofen. Reviewed all results and discharge instructions.  Lakia Kelly CNP on 11/26/2020 at 11:06 PM          Procedures         Results for orders placed or performed during the hospital encounter of 11/26/20 (from the past 24 hour(s))   CBC with platelets differential   Result Value Ref Range    WBC 20.9 (H) 4.0 - 11.0 10e9/L    RBC Count 5.27 4.4 - 5.9 10e12/L    Hemoglobin 15.6 13.3 - 17.7 g/dL    Hematocrit 46.3 40.0 - 53.0 %    MCV 88 78 - 100 fl    MCH 29.6 26.5 - 33.0 pg    MCHC 33.7 31.5 - 36.5 g/dL    RDW 13.2 10.0 - 15.0 %    Platelet Count 241 150 - 450 10e9/L    Diff Method Manual Differential     % Neutrophils 37.0 %    % Lymphocytes 57.0 %    % Monocytes 5.0 %    % Eosinophils 1.0 %    % Basophils 0.0 %    Absolute Neutrophil 7.7 1.6 - 8.3 10e9/L    Absolute Lymphocytes 11.9 (H) 0.8 - 5.3 10e9/L    Absolute Monocytes 1.0 0.0 - 1.3 10e9/L    Absolute Eosinophils 0.2 0.0 - 0.7 10e9/L    Absolute Basophils 0.0 0.0 - 0.2 10e9/L   Comprehensive metabolic panel   Result Value Ref Range    Sodium 139 133 - 144 mmol/L    Potassium 3.6 3.4 - 5.3 mmol/L    Chloride 108 94 - 109 mmol/L    Carbon Dioxide 27 20 - 32 mmol/L    Anion Gap 4 3 - 14 mmol/L    Glucose 126 (H) 70 - 99 mg/dL    Urea Nitrogen 19 7 - 30 mg/dL    Creatinine 1.06 0.66 - 1.25 mg/dL    GFR Estimate 82 >60 mL/min/[1.73_m2]    GFR Estimate If Black >90 >60 mL/min/[1.73_m2]    Calcium 8.3 (L) 8.5 -  10.1 mg/dL    Bilirubin Total 0.5 0.2 - 1.3 mg/dL    Albumin 3.9 3.4 - 5.0 g/dL    Protein Total 7.5 6.8 - 8.8 g/dL    Alkaline Phosphatase 68 40 - 150 U/L    ALT 41 0 - 70 U/L    AST 19 0 - 45 U/L   Lactic acid whole blood   Result Value Ref Range    Lactic Acid 1.7 0.7 - 2.0 mmol/L   CRP inflammation   Result Value Ref Range    CRP Inflammation <2.9 0.0 - 8.0 mg/L   Procalcitonin   Result Value Ref Range    Procalcitonin <0.05 ng/ml   Erythrocyte sedimentation rate auto   Result Value Ref Range    Sed Rate 5 0 - 15 mm/h   UA reflex to Microscopic and Culture    Specimen: Midstream Urine   Result Value Ref Range    Color Urine Light Yellow     Appearance Urine Clear     Glucose Urine Negative NEG^Negative mg/dL    Bilirubin Urine Negative NEG^Negative    Ketones Urine Negative NEG^Negative mg/dL    Specific Gravity Urine 1.015 1.003 - 1.035    Blood Urine Large (A) NEG^Negative    pH Urine 5.5 4.7 - 8.0 pH    Protein Albumin Urine 10 (A) NEG^Negative mg/dL    Urobilinogen mg/dL Normal 0.0 - 2.0 mg/dL    Nitrite Urine Negative NEG^Negative    Leukocyte Esterase Urine Negative NEG^Negative    Source Midstream Urine     RBC Urine >182 (H) 0 - 2 /HPF    WBC Urine <1 0 - 5 /HPF    Bacteria Urine None (A) NEG^Negative /HPF    Squamous Epithelial /HPF Urine 0 0 - 1 /HPF    Mucous Urine Present (A) NEG^Negative /LPF   CBC with platelets differential   Result Value Ref Range    WBC 22.7 (H) 4.0 - 11.0 10e9/L    RBC Count 4.70 4.4 - 5.9 10e12/L    Hemoglobin 14.3 13.3 - 17.7 g/dL    Hematocrit 41.8 40.0 - 53.0 %    MCV 89 78 - 100 fl    MCH 30.4 26.5 - 33.0 pg    MCHC 34.2 31.5 - 36.5 g/dL    RDW 13.1 10.0 - 15.0 %    Platelet Count 214 150 - 450 10e9/L    Diff Method Manual Differential     % Neutrophils 50.0 %    % Lymphocytes 46.0 %    % Monocytes 4.0 %    % Eosinophils 0.0 %    % Basophils 0.0 %    Absolute Neutrophil 11.4 (H) 1.6 - 8.3 10e9/L    Absolute Lymphocytes 10.4 (H) 0.8 - 5.3 10e9/L    Absolute Monocytes 0.9  "0.0 - 1.3 10e9/L    Absolute Eosinophils 0.0 0.0 - 0.7 10e9/L    Absolute Basophils 0.0 0.0 - 0.2 10e9/L    Reactive Lymphs Present        Medications   ondansetron (ZOFRAN) injection 4 mg (4 mg Intravenous Given 11/26/20 1942)   tamsulosin (FLOMAX) capsule 0.4 mg (0.4 mg Oral Given 11/26/20 2131)   iopamidol (ISOVUE-300) IV solution 61% 100 mL (100 mLs Intravenous Given 11/26/20 2025)   sodium chloride (PF) 0.9% PF flush 60 mL (50 mLs Intravenous Given 11/26/20 2025)   0.9% sodium chloride BOLUS (0 mLs Intravenous Stopped 11/26/20 2230)   cefTRIAXone in d5w (ROCEPHIN) intermittent infusion 1 g (0 g Intravenous Stopped 11/26/20 2200)   ketorolac (TORADOL) injection 30 mg (30 mg Intravenous Given 11/26/20 2128)       Assessments & Plan (with Medical Decision Making)     I have reviewed the nursing notes.    I have reviewed the findings, diagnosis, plan and need for follow up with the patient.  (N20.1) Right distal ureteral calculus  (primary encounter diagnosis)  (R91.1) Incidental pulmonary nodule, > 3mm and < 8mm  (D72.829) Leukocytosis  (D72.820) Lymphocytosis  Nontoxic appearing 50-year old male in some distress due to right sided abdominal pain that feels like \"kicked really hard in the genital.\" Work-up as above - abdominal CT shows a 3 mm right ureteral stone with mild hydronephrosis, urinalysis is negative for infection, abdominal CT also shows an incidental 6 mm pulmonary nodule. Given his history of being a former smoker he should follow-up with primary care provider on this. He has elevated WBC and lymphocyte count - no known source of infection. He is afebrile, normal lactic acid, no intra-abdominal findings on CT- appendix and bowel are normal, no respiratory symptoms, urinalysis is normal. Repeat CBC after IV fluids shows elevated WBC and lymphocytes again - lab called with plan to have reviewed by pathology and patient should follow-up on this with primary care provider. Plan to manage pain and passage " of stone with flomax, NSAIDs, zofran for nausea, and small amount of pain medications.  Recommend:  - Ensure you are drinking plenty of fluids  - ibuprofen (Advil) 800 mg with food every 8 hours  - acetaminophen (Tylenol) 1,000 mg every 6-8 hours  *Alternate ibuprofen and acetaminophen. For example: 8 am ibuprofen, 12 pm acetaminophen, 4 pm ibuprofen, 8 pm acetaminophen, etc*  - Use ondansetron (Zofran) as directed for nausea  - Use hydrocodone as directed for pain not controlled by above.  - Strain urine         RETURN TO THE ED WITH NEW OR WORSENING SYMPTOMS INCLUDING BUT NOT LIMITED TO FEVER, VOMITING, INCREASED PAIN.    ED FOLLOW-UP WITH YOUR PRIMARY CARE PROVIDER IN 3-5 DAYS.      Lakia Kelly CNP          New Prescriptions    ONDANSETRON (ZOFRAN ODT) 4 MG ODT TAB    Take 1 tablet (4 mg) by mouth every 8 hours as needed    TAMSULOSIN (FLOMAX) 0.4 MG CAPSULE    Take 1 capsule (0.4 mg) by mouth daily for 10 doses       Final diagnoses:   Right distal ureteral calculus   Incidental pulmonary nodule, > 3mm and < 8mm   Leukocytosis   Lymphocytosis       11/26/2020   [facility]     Lakia Kelly CNP  11/26/20 5778

## 2020-11-27 NOTE — TELEPHONE ENCOUNTER
12:49 PM    Reason for Call: KAMERON Otero needs a hospital follow up soon his white blood count was 20,000 and it is supposed to be around 11,000 and some other levels where high. He was also had kidney stones.     The patient is requesting an appointment for hospital f/u with Dr Hastings.    Was an appointment offered for this call? No  If yes : Appointment type              Date    Preferred method for responding to this message: Telephone Call  What is your phone number ? [phone]    If we cannot reach you directly, may we leave a detailed response at the number you provided? Yes    Can this message wait until your PCP/provider returns, if unavailable today? Yes            Sanjana Irving

## 2020-11-27 NOTE — ED NOTES
"Patient presents with complaints of pain from a kidney stone. States he was seen last night and told he had a kidney stone. Stes he felt better when he left and went home to sleep, but states when he filled his scripts he didn't take them right away as one says it could make him drowsy. Pt states \"I took the meds at about noon but I'm not feeling better\"   "

## 2020-11-27 NOTE — ED AVS SNAPSHOT
[facility]  [address]  [place] [address]  Phone: [phone]                                    Branden Becker   MRN: [id number]    Department: [facility]   Date of Visit: 11/27/2020           After Visit Summary Signature Page    I have received my discharge instructions, and my questions have been answered. I have discussed any challenges I see with this plan with the nurse or doctor.    ..........................................................................................................................................  Patient/Patient Representative Signature      ..........................................................................................................................................  Patient Representative Print Name and Relationship to Patient    ..................................................               ................................................  Date                                   Time    ..........................................................................................................................................  Reviewed by Signature/Title    ...................................................              ..............................................  Date                                               Time          22EPIC Rev 08/18

## 2020-11-27 NOTE — ED PROVIDER NOTES
"  History     Chief Complaint   Patient presents with     Flank Pain     dx with rt kidney stone yesterday, notes vomited pain med and hurting worse today     HPI     Barnden Becker is a 50 year old male who presents for 2nd ED visit in past 24 hours.    Patient seen during covid pandemic. He presents ambulatory via triage today and notes here yesterday for similar pain.  He presents sensitive at home and feeling normal and then developed recurrent severe colicky right lower quadrant pain rating to his scrotum.   He does report he vomited earlier today.  He took Tylenol, Motrin, Zofran and Flomax at home.  He notes yesterday he deferred any start of pain medications after having extensive work-up including labs urine CT abdomen pelvis with a diagnosis of renal colic, 3 mm distal stone and leukocytosis with large percent of lymphocytes.  Patient notes he has follow-up with his primary care physician to review recent CAT scan, recently white blood count 5 days from now.    He Notes yesterday he did not want anything \"stronger for pain\" but today the pain has been severe and unrelenting over the last hour or so.  He notes after he presented the ER he feels markedly better.  Pain is sharp, colicky in right lower quadrant.    Patient denies any runny nose, sore throat, cough, change in taste or smell.  No fever.  Good appetite.            Workup from 1 day ago reviewed in Epic.    Study Result    CT ABDOMEN PELVIS W CONTRAST     CLINICAL HISTORY: Male, age 50 years,  RLQ pain, leukocytosis,  febrile;     Comparison:  None.     TECHNIQUE:  CT was performed of the abdomen and pelvis with IV  contrast. Sagittal, coronal, axial and 3-D MIP reconstructions were  reviewed.      FINDINGS:  Lung bases: 6 mm pleural-based nodule in the dorsal lateral aspect of  the left lower lobe.     4.6 mm solid appearing subpleural nodule posteriorly and medially in  the right lower lobe image #22.     Mild dependent atelectasis in the " lower lobes.        STOMACH: Distended with fluid. No acute inflammatory process or  apparent mass.     Liver: Hepatic steatosis. Portal venous system is grossly normal.     Gallbladder: Normal. The biliary tree is normal.     Spleen: Normal.     Pancreas: Normal.     Adrenal glands: Normal.     Kidneys: 2 mm calculus mid pole left kidney. This mild inflammation of  the right kidney with perinephric fat stranding and very mild  dilatation of the collecting system.     Ureters: 3 mm obstructing distal right ureteral calculus located  approximately 2 to 2.5 cm from the ureterovesical junction. Left  ureter is normal.     Urinary bladder: Normal.     Vasculature: Minimal atherosclerotic calcification in the distal  abdominal aorta. No acute abnormality.     Large and small bowel: Mild diverticulosis of the colon. No acute  abnormality.     Appendix: Normal.     Bony structures: No acute abnormality.     Inguinal lymph nodes are normal. No evidence of free fluid.                                                                      IMPRESSION:   Partially obstructing 3 mm distal right ureteral calculus located  approximately 2 cm to 2.5 cm from the ureterovesical junction.     Radiodense calculus of the left kidney.     Hepatic steatosis.     Small lung nodules as described above.      This report is in agreement with the preliminary report.     In a patient greater than 35 years of age with no history of cancer,  the Fleischner Society 2017 guidelines for a single nodule or numerous  nodules smaller than 6 mm in mean diameter states the following:     Low risk patients*: No additional follow-up.     High risk patients**: Optional CT in 12 months.        *Low risk patient: Minimal or absent history of smoking and or other  known risk factor.     **High-risk patient: History of heavy smoking; exposure to asbestos,  radium or uranium; family history of lung cancer; older age; upper  lobe location of the nodule; fewer than  5 nodules; history of  emphysema/pulmonary fibrosis; upper lobe location of the nodule.     FELISA KIMBALL MD         Allergies:  No Known Allergies    Problem List:    Patient Active Problem List    Diagnosis Date Noted     Pulmonary nodules 2020     Priority: Medium     Abnormal CT scan 2020     Priority: Medium     Ureteral calculus 2020     Priority: Medium     Crum cardiac risk <10% in next 10 years 2019     Priority: Medium     3/28/19:  Age 48; smoking: no; diabetes: no; hypertension: no; systolic blood pressure:  110; date of lipid:  17; HDL:  44; total cholesterol:  174; statin: no; RISK:  2%.       Ankle pain 2015     Priority: Medium     Tenosynovitis 10/31/2012     Priority: Medium     Gastroesophageal reflux disease 2007     Priority: Medium        Past Medical History:    Past Medical History:   Diagnosis Date     GERD (gastroesophageal reflux disease)        Past Surgical History:    No past surgical history on file.    Family History:    Family History   Problem Relation Age of Onset     Obesity Mother        Social History:  Marital Status:   [2]  Social History     Tobacco Use     Smoking status: Former Smoker     Types: Cigarettes     Quit date: 2006     Years since quittin.9     Smokeless tobacco: Never Used   Substance Use Topics     Alcohol use: Yes     Comment: a case a year at the most.      Drug use: Never        Medications:         docusate sodium (COLACE) 100 MG capsule       omeprazole (PRILOSEC) 40 MG DR capsule       ondansetron (ZOFRAN ODT) 4 MG ODT tab       oxyCODONE (ROXICODONE) 5 MG tablet       tamsulosin (FLOMAX) 0.4 MG capsule          Review of Systems   Current pain, nonbloody vomit home earlier today.  No runny nose, sore throat, cough, chills, sore throat, change in taste or smell.  No.  No myalgias.  No skin rash.  All other 10 neg.    Physical Exam   BP: 131/96  Pulse: 77  Temp: 98.7  F (37.1  C)  Resp:  16  SpO2: 96 %      Physical Exam  CONSTITUTIONAL: Was not conversant and comfortable.  Notes he is markedly better.  Vitals: reviewed in epic  HEAD: normal inspection  EYES: Anicteric sclerae; no proptosis. CN 2-7 grossly intact  Ears: intact hearing to spoken voice  NECK: trachea midline.  RESPIRATORY: Normal respiratory effort.  CARDIOVASCULAR: No peripheral edema. normal rate  ABD: normal inspection.  Soft, nontender, nondistended.  Benign exam.  ; no inguinal hernias.  Normal scrotum.  Normal penis.  No flank tenderness.  SKIN: No rash, lesions or ulcers on exposed skin  MUSCULOSKELETAL No digital cyanosis. Normal gait   NEURO: alert and oriented,  fluent speech. no focal weakness.   Psych: cooperative, intact judgment.      ED Course     ED Course as of Nov 27 1441 Fri Nov 27, 2020   1419 Workup from 1 day ago reviewed, labs, urine, CT scan abd/pelvis. Received IVF, toradol, rocephin in ED.  3 mm distal right ureteral stone, wbc 22 k with increased lymph, follow up with pathology input and PMD already planned.       1423 Crp and lactate normal  UA  1 day ago , rbc, no signs of infection  Normal cmp.       1428 History and exam, pain not markedly improved  Patient would like to go home after oral med in ED.  Stable.  Sx c/w renal colic. Doubt appendictis, normal  exam. Patient has follow up already arranged with PMD next week.       1431 Copy of CT from 1 day ago and incidental findings included in discharge for primary MD follow up.           Procedures            Results for orders placed or performed during the hospital encounter of 11/26/20 (from the past 24 hour(s))   CBC with platelets differential   Result Value Ref Range    WBC 20.9 (H) 4.0 - 11.0 10e9/L    RBC Count 5.27 4.4 - 5.9 10e12/L    Hemoglobin 15.6 13.3 - 17.7 g/dL    Hematocrit 46.3 40.0 - 53.0 %    MCV 88 78 - 100 fl    MCH 29.6 26.5 - 33.0 pg    MCHC 33.7 31.5 - 36.5 g/dL    RDW 13.2 10.0 - 15.0 %    Platelet Count 241 150 - 450  10e9/L    Diff Method Manual Differential     % Neutrophils 37.0 %    % Lymphocytes 57.0 %    % Monocytes 5.0 %    % Eosinophils 1.0 %    % Basophils 0.0 %    Absolute Neutrophil 7.7 1.6 - 8.3 10e9/L    Absolute Lymphocytes 11.9 (H) 0.8 - 5.3 10e9/L    Absolute Monocytes 1.0 0.0 - 1.3 10e9/L    Absolute Eosinophils 0.2 0.0 - 0.7 10e9/L    Absolute Basophils 0.0 0.0 - 0.2 10e9/L   Comprehensive metabolic panel   Result Value Ref Range    Sodium 139 133 - 144 mmol/L    Potassium 3.6 3.4 - 5.3 mmol/L    Chloride 108 94 - 109 mmol/L    Carbon Dioxide 27 20 - 32 mmol/L    Anion Gap 4 3 - 14 mmol/L    Glucose 126 (H) 70 - 99 mg/dL    Urea Nitrogen 19 7 - 30 mg/dL    Creatinine 1.06 0.66 - 1.25 mg/dL    GFR Estimate 82 >60 mL/min/[1.73_m2]    GFR Estimate If Black >90 >60 mL/min/[1.73_m2]    Calcium 8.3 (L) 8.5 - 10.1 mg/dL    Bilirubin Total 0.5 0.2 - 1.3 mg/dL    Albumin 3.9 3.4 - 5.0 g/dL    Protein Total 7.5 6.8 - 8.8 g/dL    Alkaline Phosphatase 68 40 - 150 U/L    ALT 41 0 - 70 U/L    AST 19 0 - 45 U/L   Lactic acid whole blood   Result Value Ref Range    Lactic Acid 1.7 0.7 - 2.0 mmol/L   CRP inflammation   Result Value Ref Range    CRP Inflammation <2.9 0.0 - 8.0 mg/L   Procalcitonin   Result Value Ref Range    Procalcitonin <0.05 ng/ml   Erythrocyte sedimentation rate auto   Result Value Ref Range    Sed Rate 5 0 - 15 mm/h   CT Abdomen Pelvis w Contrast    Narrative    CT ABDOMEN PELVIS W CONTRAST    CLINICAL HISTORY: Male, age 50 years,  RLQ pain, leukocytosis,  febrile;    Comparison:  None.    TECHNIQUE:  CT was performed of the abdomen and pelvis with IV  contrast. Sagittal, coronal, axial and 3-D MIP reconstructions were  reviewed.     FINDINGS:  Lung bases: 6 mm pleural-based nodule in the dorsal lateral aspect of  the left lower lobe.    4.6 mm solid appearing subpleural nodule posteriorly and medially in  the right lower lobe image #22.    Mild dependent atelectasis in the lower lobes.      STOMACH:  Distended with fluid. No acute inflammatory process or  apparent mass.    Liver: Hepatic steatosis. Portal venous system is grossly normal.    Gallbladder: Normal. The biliary tree is normal.    Spleen: Normal.    Pancreas: Normal.    Adrenal glands: Normal.    Kidneys: 2 mm calculus mid pole left kidney. This mild inflammation of  the right kidney with perinephric fat stranding and very mild  dilatation of the collecting system.    Ureters: 3 mm obstructing distal right ureteral calculus located  approximately 2 to 2.5 cm from the ureterovesical junction. Left  ureter is normal.    Urinary bladder: Normal.    Vasculature: Minimal atherosclerotic calcification in the distal  abdominal aorta. No acute abnormality.    Large and small bowel: Mild diverticulosis of the colon. No acute  abnormality.    Appendix: Normal.    Bony structures: No acute abnormality.    Inguinal lymph nodes are normal. No evidence of free fluid.      Impression    IMPRESSION:   Partially obstructing 3 mm distal right ureteral calculus located  approximately 2 cm to 2.5 cm from the ureterovesical junction.    Radiodense calculus of the left kidney.    Hepatic steatosis.    Small lung nodules as described above.     This report is in agreement with the preliminary report.    In a patient greater than 35 years of age with no history of cancer,  the Fleischner Society 2017 guidelines for a single nodule or numerous  nodules smaller than 6 mm in mean diameter states the following:    Low risk patients*: No additional follow-up.    High risk patients**: Optional CT in 12 months.      *Low risk patient: Minimal or absent history of smoking and or other  known risk factor.    **High-risk patient: History of heavy smoking; exposure to asbestos,  radium or uranium; family history of lung cancer; older age; upper  lobe location of the nodule; fewer than 5 nodules; history of  emphysema/pulmonary fibrosis; upper lobe location of the nodule.    FELISA  MD JIGAR   UA reflex to Microscopic and Culture    Specimen: Midstream Urine   Result Value Ref Range    Color Urine Light Yellow     Appearance Urine Clear     Glucose Urine Negative NEG^Negative mg/dL    Bilirubin Urine Negative NEG^Negative    Ketones Urine Negative NEG^Negative mg/dL    Specific Gravity Urine 1.015 1.003 - 1.035    Blood Urine Large (A) NEG^Negative    pH Urine 5.5 4.7 - 8.0 pH    Protein Albumin Urine 10 (A) NEG^Negative mg/dL    Urobilinogen mg/dL Normal 0.0 - 2.0 mg/dL    Nitrite Urine Negative NEG^Negative    Leukocyte Esterase Urine Negative NEG^Negative    Source Midstream Urine     RBC Urine >182 (H) 0 - 2 /HPF    WBC Urine <1 0 - 5 /HPF    Bacteria Urine None (A) NEG^Negative /HPF    Squamous Epithelial /HPF Urine 0 0 - 1 /HPF    Mucous Urine Present (A) NEG^Negative /LPF   CBC with platelets differential   Result Value Ref Range    WBC 22.7 (H) 4.0 - 11.0 10e9/L    RBC Count 4.70 4.4 - 5.9 10e12/L    Hemoglobin 14.3 13.3 - 17.7 g/dL    Hematocrit 41.8 40.0 - 53.0 %    MCV 89 78 - 100 fl    MCH 30.4 26.5 - 33.0 pg    MCHC 34.2 31.5 - 36.5 g/dL    RDW 13.1 10.0 - 15.0 %    Platelet Count 214 150 - 450 10e9/L    Diff Method Manual Differential     % Neutrophils 50.0 %    % Lymphocytes 46.0 %    % Monocytes 4.0 %    % Eosinophils 0.0 %    % Basophils 0.0 %    Absolute Neutrophil 11.4 (H) 1.6 - 8.3 10e9/L    Absolute Lymphocytes 10.4 (H) 0.8 - 5.3 10e9/L    Absolute Monocytes 0.9 0.0 - 1.3 10e9/L    Absolute Eosinophils 0.0 0.0 - 0.7 10e9/L    Absolute Basophils 0.0 0.0 - 0.2 10e9/L    Reactive Lymphs Present        Medications   ondansetron (ZOFRAN-ODT) ODT tab 4 mg (has no administration in time range)   oxyCODONE (ROXICODONE) tablet 10 mg (has no administration in time range)       Assessments & Plan (with Medical Decision Making)     I have reviewed the nursing report, today's vital signs.  I reviewed extensive valuation last 15 hours showing a distal right ureteral calculus and  elevated white count with left shift besides.  Incidental pulmonology is noted.  I discussed differential diagnosis.  I doubt appendicitis.  Doubt serious bacterial infection.  Underlying neoplasm due to elevated lymphocytes is a possibility.  Patient already has follow-up for the elevated white blood count with his regular physician 5 days from now.  Plan patient I discussed differential he does discharge home.  He was offered IV fluids antiemetics and IV pain medications.  He deferred on such.    Patient was given 2 oxycodone, Zofran offered.  He is discharged with prescription for oxycodone, opioid precautions, copy of CT scan for follow-up next week as scheduled to review incidental pulmonary nodules and follow-up of the pending pathology review with a recent CBC.  Patient notes appointment with PMD provider 5 days from discharge.  Stool softeners were also provided to prevent constipation from the oxycodone.    I have reviewed the findings, diagnosis, plan and need for follow up with the patient.    Branden Becker  has been assessed  and at this time based on the information available to me, I feel the patient is medically stable for discharge.  At times conditions evolve or change,thus repeat medical evaluation is recommended if any additional concerns arise.      The patient is stable for discharge with PMD follow up. Reasons to return reviewed, understood.  Copy of CT scan including incidental findings as included in todays discharge paperwork with request for patient to bring such to his primary care physician for complete review of today's work-up and work-up in the last 24 hours.  Patient is well aware of his recent elevated wbc and lymphocytosis.  No covid symptoms.       New Prescriptions    DOCUSATE SODIUM (COLACE) 100 MG CAPSULE    Take 1 capsule (100 mg) by mouth 2 times daily for 10 days    OXYCODONE (ROXICODONE) 5 MG TABLET    Take 1 tablet (5 mg) by mouth every 6 hours as needed for pain (may  increase to 2 tablets q 4-6 hours)       Final diagnoses:   Renal colic   Ureteral calculus   Pulmonary nodules - primary MD to nae ct scan from 11/26/2020 in follow up on dec 2nd as scheduled.   Abnormal CT scan   Lymphocytosis - pathology reviewing wbc from 11-, patient has primary MD follow up on Dec 2,2020 to review.     Vanessa Hastings MD  [address] [facility] [address]  [phone]    In 5 days  as scheduled    [facility]  [address] [address] [phone]    If symptoms worsen, temp > 100.4, uncontrolled pain or worse    AVS for renal colic, copy of CT scan with patient.     11/27/2020   [facility]     Edd Dietz MD  11/27/20 1690

## 2020-11-27 NOTE — ED NOTES
Patient given written and verbal discharge instructions and patient verbalizes understanding. Patient left department ambulatory. Patient advised that prescriptions will be e-scribed to [facility], per request. Patient also provided with urine cup, urinal and strainer to strain urine at home.

## 2020-11-27 NOTE — DISCHARGE INSTRUCTIONS
Bring a copy of this report to your primary MD when reviewing the ED visit and to recheck your wbc as scheduled next week.    Study Result    CT ABDOMEN PELVIS W CONTRAST     CLINICAL HISTORY: Male, age 50 years,  RLQ pain, leukocytosis,  febrile;     Comparison:  None.     TECHNIQUE:  CT was performed of the abdomen and pelvis with IV  contrast. Sagittal, coronal, axial and 3-D MIP reconstructions were  reviewed.      FINDINGS:  Lung bases: 6 mm pleural-based nodule in the dorsal lateral aspect of  the left lower lobe.     4.6 mm solid appearing subpleural nodule posteriorly and medially in  the right lower lobe image #22.     Mild dependent atelectasis in the lower lobes.        STOMACH: Distended with fluid. No acute inflammatory process or  apparent mass.     Liver: Hepatic steatosis. Portal venous system is grossly normal.     Gallbladder: Normal. The biliary tree is normal.     Spleen: Normal.     Pancreas: Normal.     Adrenal glands: Normal.     Kidneys: 2 mm calculus mid pole left kidney. This mild inflammation of  the right kidney with perinephric fat stranding and very mild  dilatation of the collecting system.     Ureters: 3 mm obstructing distal right ureteral calculus located  approximately 2 to 2.5 cm from the ureterovesical junction. Left  ureter is normal.     Urinary bladder: Normal.     Vasculature: Minimal atherosclerotic calcification in the distal  abdominal aorta. No acute abnormality.     Large and small bowel: Mild diverticulosis of the colon. No acute  abnormality.     Appendix: Normal.     Bony structures: No acute abnormality.     Inguinal lymph nodes are normal. No evidence of free fluid.                                                                      IMPRESSION:   Partially obstructing 3 mm distal right ureteral calculus located  approximately 2 cm to 2.5 cm from the ureterovesical junction.     Radiodense calculus of the left kidney.     Hepatic steatosis.     Small lung nodules  as described above.      This report is in agreement with the preliminary report.     In a patient greater than 35 years of age with no history of cancer,  the Fleischner Society 2017 guidelines for a single nodule or numerous  nodules smaller than 6 mm in mean diameter states the following:     Low risk patients*: No additional follow-up.     High risk patients**: Optional CT in 12 months.        *Low risk patient: Minimal or absent history of smoking and or other  known risk factor.     **High-risk patient: History of heavy smoking; exposure to asbestos,  radium or uranium; family history of lung cancer; older age; upper  lobe location of the nodule; fewer than 5 nodules; history of  emphysema/pulmonary fibrosis; upper lobe location of the nodule.     FELISA KIMBALL MD

## 2020-12-01 NOTE — PROGRESS NOTES
"Subjective     Branden Esha Becker is a 50 year old male who presents to clinic today for the following health issues:    HPI         ED/UC Followup:    Facility:  [facility]  Date of visit: 11-27-20  Reason for visit: renal colic, ureteral calculus, pulmonary nodules  Current Status: passed stone. States he still has some discomfort.  Denies any nausea or vomiting.     Pt brings in stone for analysis. He is feeling much improved since passing. No longer on pain mediation or flomax. No fevers, chills. No hematuria.     He is mainly here to discuss his lung nodule and elevated WBC. He has been feeling in his usual state of health other than the renal stone. He has no sweats, fatigue, shortness of breath, weight loss, loss of appetite, nausea, vomiting, changes on bowels. No rashes or lumps, masses noted.       Review of Systems   Constitutional, HEENT, cardiovascular, pulmonary, gi and gu systems are negative, except as otherwise noted.      Objective    /88 (Patient Position: Sitting)   Pulse 66   Ht 1.753 m (5' 9\")   Wt 96.6 kg (213 lb)   SpO2 97%   BMI 31.45 kg/m    Body mass index is 31.45 kg/m .  Physical Exam   GENERAL: healthy, alert and no distress  NECK: no adenopathy, no asymmetry, masses, or scars and thyroid normal to palpation  RESP: lungs clear to auscultation - no rales, rhonchi or wheezes  CV: regular rates and rhythm, normal S1 S2, no S3 or S4,  no peripheral edema  ABDOMEN: soft, nontender, no hepatosplenomegaly, no masses and bowel sounds normal  MS: no gross musculoskeletal defects noted, no edema  SKIN: no suspicious lesions or rashes  PSYCH: mentation appears normal, affect normal/bright    No results found for any visits on 12/02/20.        Assessment & Plan     Kidney stone  Passed. Continue good hydration.   - Stone analysis; Future  - Stone analysis    Pulmonary nodules / Lymphocytosis  Discussed unclear significant of the pulmonary nodule. Given his WBC, however, suspicion raised.  " Discussed with surgery, given small size, likely difficult to biopsy. Will get CT of the chest and consider biopsy if more amenable site noted. No other LAD on CT of the abdomen and pelvis. Given smear result which was communicated to patient, will refer to Oncology/Hematology for further work up.   - CT Chest w/o Contrast; Future  - Oncology/Hematology Adult Referral; Future      Return in about 2 weeks (around 12/16/2020) for virtual follow up, review CT and hematology visit. .    Vanessa Hastings MD  [facility]

## 2020-12-02 ENCOUNTER — OFFICE VISIT (OUTPATIENT)
Dept: FAMILY MEDICINE | Facility: OTHER | Age: 50
End: 2020-12-02
Attending: FAMILY MEDICINE
Payer: COMMERCIAL

## 2020-12-02 VITALS
SYSTOLIC BLOOD PRESSURE: 124 MMHG | WEIGHT: 213 LBS | DIASTOLIC BLOOD PRESSURE: 88 MMHG | HEIGHT: 69 IN | BODY MASS INDEX: 31.55 KG/M2 | OXYGEN SATURATION: 97 % | HEART RATE: 66 BPM

## 2020-12-02 DIAGNOSIS — N20.0 KIDNEY STONE: Primary | ICD-10-CM

## 2020-12-02 DIAGNOSIS — R91.8 PULMONARY NODULES: ICD-10-CM

## 2020-12-02 DIAGNOSIS — D72.820 LYMPHOCYTOSIS: ICD-10-CM

## 2020-12-02 PROCEDURE — 99214 OFFICE O/P EST MOD 30 MIN: CPT | Performed by: FAMILY MEDICINE

## 2020-12-02 PROCEDURE — 82365 CALCULUS SPECTROSCOPY: CPT | Mod: ZL

## 2020-12-02 ASSESSMENT — PAIN SCALES - GENERAL: PAINLEVEL: NO PAIN (1)

## 2020-12-02 ASSESSMENT — MIFFLIN-ST. JEOR: SCORE: 1821.54

## 2020-12-02 NOTE — NURSING NOTE
"Chief Complaint   Patient presents with     ER F/U       Initial /88 (Patient Position: Sitting)   Pulse 66   Ht 1.753 m (5' 9\")   Wt 96.6 kg (213 lb)   SpO2 97%   BMI 31.45 kg/m   Estimated body mass index is 31.45 kg/m  as calculated from the following:    Height as of this encounter: 1.753 m (5' 9\").    Weight as of this encounter: 96.6 kg (213 lb).  Medication Reconciliation: complete  Manda Colby LPN  "

## 2020-12-06 LAB
APPEARANCE STONE: NORMAL
COMPN STONE: NORMAL
NUMBER STONE: 2
SIZE STONE: NORMAL MM
WT STONE: 12 MG

## 2020-12-10 ENCOUNTER — HOSPITAL ENCOUNTER (OUTPATIENT)
Dept: CT IMAGING | Facility: HOSPITAL | Age: 50
Discharge: HOME | End: 2020-12-10
Attending: FAMILY MEDICINE | Admitting: FAMILY MEDICINE
Payer: COMMERCIAL

## 2020-12-10 DIAGNOSIS — D72.820 LYMPHOCYTOSIS: ICD-10-CM

## 2020-12-10 PROCEDURE — 71250 CT THORAX DX C-: CPT

## 2020-12-11 LAB — COPATH REPORT: NORMAL

## 2020-12-18 ENCOUNTER — VIRTUAL VISIT (OUTPATIENT)
Dept: FAMILY MEDICINE | Facility: OTHER | Age: 50
End: 2020-12-18
Attending: FAMILY MEDICINE
Payer: COMMERCIAL

## 2020-12-18 DIAGNOSIS — D72.819 LEUKOPENIA, UNSPECIFIED TYPE: ICD-10-CM

## 2020-12-18 DIAGNOSIS — R91.8 PULMONARY NODULES: Primary | ICD-10-CM

## 2020-12-18 PROCEDURE — 99213 OFFICE O/P EST LOW 20 MIN: CPT | Mod: 95 | Performed by: FAMILY MEDICINE

## 2020-12-21 ENCOUNTER — APPOINTMENT (OUTPATIENT)
Dept: LAB | Facility: OTHER | Age: 50
End: 2020-12-21
Attending: INTERNAL MEDICINE
Payer: COMMERCIAL

## 2020-12-21 ENCOUNTER — ONCOLOGY VISIT (OUTPATIENT)
Dept: ONCOLOGY | Facility: OTHER | Age: 50
End: 2020-12-21
Attending: FAMILY MEDICINE
Payer: COMMERCIAL

## 2020-12-21 VITALS
SYSTOLIC BLOOD PRESSURE: 122 MMHG | TEMPERATURE: 100.2 F | DIASTOLIC BLOOD PRESSURE: 88 MMHG | OXYGEN SATURATION: 96 % | HEIGHT: 69 IN | WEIGHT: 221 LBS | HEART RATE: 76 BPM | BODY MASS INDEX: 32.73 KG/M2

## 2020-12-21 DIAGNOSIS — R91.8 PULMONARY NODULES: ICD-10-CM

## 2020-12-21 DIAGNOSIS — D72.820 LYMPHOCYTOSIS: ICD-10-CM

## 2020-12-21 LAB
ALBUMIN SERPL-MCNC: 4 G/DL (ref 3.4–5)
ALP SERPL-CCNC: 63 U/L (ref 40–150)
ALT SERPL W P-5'-P-CCNC: 48 U/L (ref 0–70)
ANION GAP SERPL CALCULATED.3IONS-SCNC: 4 MMOL/L (ref 3–14)
AST SERPL W P-5'-P-CCNC: 21 U/L (ref 0–45)
BASOPHILS # BLD AUTO: 0 10E9/L (ref 0–0.2)
BASOPHILS NFR BLD AUTO: 0 %
BILIRUB SERPL-MCNC: 1 MG/DL (ref 0.2–1.3)
BUN SERPL-MCNC: 16 MG/DL (ref 7–30)
CALCIUM SERPL-MCNC: 8.5 MG/DL (ref 8.5–10.1)
CHLORIDE SERPL-SCNC: 107 MMOL/L (ref 94–109)
CO2 SERPL-SCNC: 30 MMOL/L (ref 20–32)
CREAT SERPL-MCNC: 0.77 MG/DL (ref 0.66–1.25)
DIFFERENTIAL METHOD BLD: ABNORMAL
EOSINOPHIL # BLD AUTO: 0 10E9/L (ref 0–0.7)
EOSINOPHIL NFR BLD AUTO: 0 %
ERYTHROCYTE [DISTWIDTH] IN BLOOD BY AUTOMATED COUNT: 13.6 % (ref 10–15)
GFR SERPL CREATININE-BSD FRML MDRD: >90 ML/MIN/{1.73_M2}
GLUCOSE SERPL-MCNC: 97 MG/DL (ref 70–99)
HCT VFR BLD AUTO: 44.6 % (ref 40–53)
HGB BLD-MCNC: 15.2 G/DL (ref 13.3–17.7)
LDH SERPL L TO P-CCNC: 194 U/L (ref 85–227)
LYMPHOCYTES # BLD AUTO: 8.4 10E9/L (ref 0.8–5.3)
LYMPHOCYTES NFR BLD AUTO: 48 %
MCH RBC QN AUTO: 30.1 PG (ref 26.5–33)
MCHC RBC AUTO-ENTMCNC: 34.1 G/DL (ref 31.5–36.5)
MCV RBC AUTO: 88 FL (ref 78–100)
METAMYELOCYTES # BLD: 0.2 10E9/L
METAMYELOCYTES NFR BLD MANUAL: 1 %
MONOCYTES # BLD AUTO: 0.3 10E9/L (ref 0–1.3)
MONOCYTES NFR BLD AUTO: 2 %
NEUTROPHILS # BLD AUTO: 8.4 10E9/L (ref 1.6–8.3)
NEUTROPHILS NFR BLD AUTO: 48 %
NEUTS BAND # BLD AUTO: 0.2 10E9/L (ref 0–0.6)
NEUTS BAND NFR BLD MANUAL: 1 %
PLATELET # BLD AUTO: 196 10E9/L (ref 150–450)
PLATELET # BLD EST: ABNORMAL 10*3/UL
POTASSIUM SERPL-SCNC: 3.6 MMOL/L (ref 3.4–5.3)
PROT SERPL-MCNC: 7.3 G/DL (ref 6.8–8.8)
RBC # BLD AUTO: 5.05 10E12/L (ref 4.4–5.9)
RBC MORPH BLD: ABNORMAL
SMUDGE CELLS BLD QL SMEAR: PRESENT
SODIUM SERPL-SCNC: 141 MMOL/L (ref 133–144)
WBC # BLD AUTO: 17.4 10E9/L (ref 4–11)

## 2020-12-21 PROCEDURE — 88185 FLOWCYTOMETRY/TC ADD-ON: CPT | Mod: TC | Performed by: INTERNAL MEDICINE

## 2020-12-21 PROCEDURE — 99204 OFFICE O/P NEW MOD 45 MIN: CPT | Performed by: INTERNAL MEDICINE

## 2020-12-21 PROCEDURE — 99N1137 PR STATISTIC FLOW >15 ABY TC 88189: Performed by: INTERNAL MEDICINE

## 2020-12-21 PROCEDURE — 83615 LACTATE (LD) (LDH) ENZYME: CPT | Performed by: INTERNAL MEDICINE

## 2020-12-21 PROCEDURE — 88184 FLOWCYTOMETRY/ TC 1 MARKER: CPT | Mod: TC | Performed by: INTERNAL MEDICINE

## 2020-12-21 PROCEDURE — 36415 COLL VENOUS BLD VENIPUNCTURE: CPT | Performed by: INTERNAL MEDICINE

## 2020-12-21 PROCEDURE — 80053 COMPREHEN METABOLIC PANEL: CPT | Performed by: INTERNAL MEDICINE

## 2020-12-21 PROCEDURE — 85025 COMPLETE CBC W/AUTO DIFF WBC: CPT | Performed by: INTERNAL MEDICINE

## 2020-12-21 RX ORDER — IBUPROFEN 200 MG
200 TABLET ORAL EVERY 4 HOURS PRN
COMMUNITY

## 2020-12-21 ASSESSMENT — MIFFLIN-ST. JEOR: SCORE: 1857.83

## 2020-12-21 ASSESSMENT — PAIN SCALES - GENERAL: PAINLEVEL: NO PAIN (0)

## 2020-12-21 ASSESSMENT — PATIENT HEALTH QUESTIONNAIRE - PHQ9: SUM OF ALL RESPONSES TO PHQ QUESTIONS 1-9: 0

## 2020-12-21 NOTE — PATIENT INSTRUCTIONS
We would like to see you back in January after PET scan. Please come 30minute(s) prior for lab work. When you are in need of a refill of your medications, please call your pharmacy and they will send us the request. If you have any questions please call [phone]

## 2020-12-21 NOTE — NURSING NOTE
"Chief Complaint   Patient presents with     Consult     PEs/ lymphocytosis- referred by Dr Hastings       Initial /88   Pulse 76   Temp 100.2  F (37.9  C) (Tympanic)   Ht 1.753 m (5' 9\")   Wt 100.2 kg (221 lb)   SpO2 96%   BMI 32.64 kg/m   Estimated body mass index is 32.64 kg/m  as calculated from the following:    Height as of this encounter: 1.753 m (5' 9\").    Weight as of this encounter: 100.2 kg (221 lb).  Medication Reconciliation: complete     Immunizations and advance directives status reviewed. Pain scale 0  , PHQ-9 0.     Patient was assessed using the NCCN psychosocial distress thermometer. Patient rated the score as a 0. Patient rated current stressors as 0. Stressors will be brought to the attention of provider or Oncology RN Care Coordinator for a score of 6 or greater or per nurses discretion.                  Chelly Figueroa LPN  "

## 2020-12-22 NOTE — PROGRESS NOTES
Visit Date:   12/21/2020      HEMATOLOGY/ONCOLOGY CONSULTATION      REASON FOR CONSULTATION:  Lymphocytosis, lung nodules.      REQUESTING PHYSICIAN:  Vanessa Hastings MD      HISTORY OF PRESENT ILLNESS:  Mr. Becker is a 50-year-old white male with history of tobacco abuse we were asked to evaluate concerning new diagnosis of lymphocytosis and pulmonary nodules.  Apparently he presented to the emergency room with right-sided abdominal pain and was found to have a right ureteral stone with mild hydronephrosis.  The patient was given IV fluids and was followed at the same time.  A CBC was drawn and revealed a white count of 20.9 with 11.9 lymphocytes, H and H 15.6 and 46.3, platelet count 241.  Peripheral blood smear was ordered and was read as mild lymphocytosis, mild toxic neutrophilia, increased rouleaux formation, persistent leukocytosis may be associated with chronic lymphocytic leukemia, well-differentiated lymphocytic lymphoma.  The patient also had a CT chest done on 12/10 which was read as multiple small noncalcified pulmonary nodules.  They were mainly in the right and left lower lobes and were all measured 6 mm or less.  The patient did have a history of tobacco abuse in the past, quit 13 years ago.  He was in [place], a war .  He was in the [employer].  Denies any fevers, night sweats, weight loss, cough, fever, hemoptysis, bone pain.      PAST MEDICAL HISTORY:  Significant for tobacco abuse, urolithiasis.      MEDICATIONS:  Medications he is currently on include:   1.  Ibuprofen 200 mg q.4h. p.r.n.   2.  Prilosec 40 mg daily.      SOCIAL HISTORY:  He is a former smoker; he quit 13 years ago.  Alcohol is negative.  He is a [employer] [place] [employer] .  He currently works for the [employer] as well as for [employer].      FAMILY HISTORY:  Noncontributory.      REVIEW OF SYSTEMS:   CENTRAL NERVOUS SYSTEM:  Negative for headache, change in mental status.   ENT:  Negative for hearing loss.   RESPIRATORY:   Negative for cough, hemoptysis, or shortness of breath.   CARDIAC:  Negative for chest pain, palpitations, orthopnea, PND, ankle edema.   GASTROINTESTINAL:  Negative for change in bowel habits, bright red blood per rectum, hematemesis.   MUSCULOSKELETAL:  Unremarkable.   GENITOURINARY:  Negative for nocturia, urgency, frequency, hematuria.   CONSTITUTIONAL:  Negative for fevers, night sweats, weight loss.   HEMATOLOGIC:  Negative for easy bruisability, gingival bleeding, epistaxis.      PHYSICAL EXAMINATION:   GENERAL:  He is a well-developed, well-nourished middle-aged white male in no acute distress.   VITAL SIGNS:  Blood pressure 122/88, pulse 76, temperature 100.2.   HEENT:  Atraumatic, normocephalic.  Oropharynx nonerythematous.   NECK:  Supple.   LUNGS:  Clear to auscultation and percussion.   HEART:  Regular rhythm, S1, S2 normal.   ABDOMEN:  Soft, normoactive bowel sounds.  No mass, nontender.   LYMPHATICS:  No cervical, supraclavicular, axillary or inguinal nodes.   EXTREMITIES:  Without edema.   NEUROLOGIC:  Nonfocal.      LABORATORY DATA:  Laboratories reveal CBC with white count 17.4 with 48% neutrophils, 48% lymphocytes.  BUN 16, creatinine 0.77.  LFTs are normal.  LDH is 194.      IMPRESSION:     1.  Thrombocytosis, rule out CLL.  We will obtain peripheral blood flow cytometry.   2.  Lung nodules.  We would like to rule out malignancy by obtaining a PET scan given her history of tobacco abuse.  Otherwise, we will see the patient after the above workup.      Seventy minutes was spent with the patient, greater than half the time was spent in counseling and coordination of care.         JOHN LI MD             D: 2020   T: 2020   MT: YANIRA      Name:     JIMI KATHLEEN   MRN:      [id number]        Account:      [id number]   :      1970           Visit Date:   2020      Document: [id number]       cc: Vanessa Hastings MD

## 2020-12-23 ENCOUNTER — DOCUMENTATION ONLY (OUTPATIENT)
Dept: OTHER | Facility: CLINIC | Age: 50
End: 2020-12-23

## 2020-12-23 LAB — COPATH REPORT: NORMAL

## 2021-01-08 ENCOUNTER — TELEPHONE (OUTPATIENT)
Dept: ONCOLOGY | Facility: OTHER | Age: 51
End: 2021-01-08

## 2021-01-08 NOTE — TELEPHONE ENCOUNTER
Patient called wanting to know why he hasnt been scheduled for his pet scan. I had Gwen check and it is still awaiting prior auth. Patient will call his insurance to see what the hold up is on the prior auth. His follow up with Dr Erickson is on 1/26/2021

## 2021-01-19 ENCOUNTER — TELEPHONE (OUTPATIENT)
Dept: PET IMAGING | Facility: HOSPITAL | Age: 51
End: 2021-01-19

## 2021-01-20 ENCOUNTER — HOSPITAL ENCOUNTER (OUTPATIENT)
Dept: PET IMAGING | Facility: HOSPITAL | Age: 51
Discharge: HOME | End: 2021-01-20
Attending: INTERNAL MEDICINE | Admitting: INTERNAL MEDICINE
Payer: COMMERCIAL

## 2021-01-20 DIAGNOSIS — D72.820 LYMPHOCYTOSIS: ICD-10-CM

## 2021-01-20 DIAGNOSIS — R91.8 PULMONARY NODULES: ICD-10-CM

## 2021-01-20 PROCEDURE — 78815 PET IMAGE W/CT SKULL-THIGH: CPT | Mod: PI

## 2021-01-20 PROCEDURE — 343N000001 HC RX 343: Performed by: INTERNAL MEDICINE

## 2021-01-20 PROCEDURE — A9552 F18 FDG: HCPCS | Performed by: INTERNAL MEDICINE

## 2021-01-20 RX ADMIN — FLUDEOXYGLUCOSE F-18 12.81 MCI.: 500 INJECTION, SOLUTION INTRAVENOUS at 09:58

## 2021-01-26 ENCOUNTER — ONCOLOGY VISIT (OUTPATIENT)
Dept: ONCOLOGY | Facility: OTHER | Age: 51
End: 2021-01-26
Attending: INTERNAL MEDICINE
Payer: COMMERCIAL

## 2021-01-26 VITALS
SYSTOLIC BLOOD PRESSURE: 130 MMHG | BODY MASS INDEX: 32.3 KG/M2 | HEART RATE: 73 BPM | TEMPERATURE: 96.4 F | DIASTOLIC BLOOD PRESSURE: 86 MMHG | WEIGHT: 218.7 LBS | OXYGEN SATURATION: 96 %

## 2021-01-26 DIAGNOSIS — C91.10 CLL (CHRONIC LYMPHOCYTIC LEUKEMIA) (H): Primary | ICD-10-CM

## 2021-01-26 PROCEDURE — 36415 COLL VENOUS BLD VENIPUNCTURE: CPT | Performed by: INTERNAL MEDICINE

## 2021-01-26 PROCEDURE — 81450 HL NEO GSAP 5-50DNA/DNA&RNA: CPT | Performed by: INTERNAL MEDICINE

## 2021-01-26 PROCEDURE — 99215 OFFICE O/P EST HI 40 MIN: CPT | Performed by: INTERNAL MEDICINE

## 2021-01-26 NOTE — PATIENT INSTRUCTIONS
We would like to see you back in 3 months with labs same day. Please come 30minute(s) prior for lab work.      We will call with the results of lab drawn today.        When you are in need of a refill of your medications, please call your pharmacy and they will send us the request. If you have any questions please call [phone]

## 2021-01-26 NOTE — PROGRESS NOTES
Visit Date:   01/26/2021      HEMATOLOGY ONCOLOGY CLINIC NOTE       Mr. Becker returns for followup of leukocytosis and history of lung nodules.  We had seen the patient in consultation at the request of Dr. Hastings on 12/21.  At that time, Mr. Becker was a 50-year-old white male with history of tobacco abuse who we were asked to evaluate concerning a diagnosis of lymphocytosis and a pulmonary nodule.  Apparently, he presented to the emergency room with right-sided abdominal pain and was found to have a right ureteral stone with mild hydronephrosis.  The patient was given IV fluids and was followed at the same time.  A CBC was drawn and revealed white count of 20.9 with 11.9 lymphocytes, H and H 15.6 and 46.3, platelet count 241.  Peripheral blood smear was ordered and was read as mild lymphocytosis, mild toxic neutrophilia, which may be associated with chronic lymphocytic leukemia, well-differentiated lymphocytic lymphoma.  The patient also had a CT chest done on 12/10 which was read as multiple small noncalcified pulmonary nodules.  These were mainly in the right and left lower lobes and all measured 6 mm or less.  The patient did have a history of tobacco abuse in the past and quit 13 years ago.  He was in [place].  He is a war .  He was in the [employer].  He denies any fevers, night sweats, weight loss, , bone pain.  When we saw the patient, we wanted to rule out CLL by obtaining peripheral blood flow cytometry.  This was obtained and revealed that the patient had 37% cells that were CD5 positive kappa monotypic B cells.  Clonal B cells were negative for CD38, negative for CD49.  This was all consistent with CLL.  We also ordered a PET scan, which was essentially negative.  Lung nodules seen on previous CT were not PET avid.  There was no lymphadenopathy or splenomegaly.  The patient otherwise is asymptomatic.  He denies any fevers, night sweats, weight loss, cough, shortness of breath.  He is currently not  smoking.      PHYSICAL EXAMINATION:   GENERAL:  He is a middle-aged white male in no acute distress.   VITAL SIGNS:  Blood pressure 130/86, pulse 73, temp 96.4.   HEENT:  Atraumatic, normocephalic.  Oropharynx is nonerythematous.   NECK:  Supple.   LUNGS:  Clear to auscultation and percussion.   HEART:  Regular rhythm.  S1, S2 normal.   ABDOMEN:  Soft, normoactive bowel sounds.  No mass, nontender.   LYMPHATICS:  No cervical, supraclavicular, axillary or inguinal nodes.   EXTREMITIES:  No edema.   NEUROLOGIC:  Nonfocal.      LABORATORY DATA:  CBC with white count 17.4 with 8.4 lymphocytes, H and H 15.2 and 44.6, platelet count 196.  LDH is 194.  LFTs are normal.      IMPRESSION:     1.  Stage 0 chronic lymphocytic leukemia with no evidence of splenomegaly, anemia, thrombocytopenia or lymphadenopathy.  We would like to obtain a FISH panel to rule out 17p deletion.  If 17p deletion testing is negative, the patient has an excellent prognosis and we will use a watch and wait approach.  We will call the patient with the results.  If 17p deletion is negative, we will see the patient in 3 months, obtain CBC, CMP, LDH.   2.  Pulmonary nodules.  PET scan was negative.  We will likely repeat CT scan in 6 months given his history of tobacco abuse.      Forty minutes were spent with the patient.  We reviewed, labs reviewed flow cytometry results, reviewed previous notes, reviewed PET scan, spent face-to-face time with the patient, and ordered future labs, including FISH panel.         JOHN LI MD             D: 2021   T: 2021   MT: ANGEL LUIS      Name:     JIMI KATHLEEN   MRN:      [id number]        Account:      [id number]   :      1970           Visit Date:   2021      Document: [id number]       cc: Vanessa Hastings MD

## 2021-01-26 NOTE — PROGRESS NOTES
"No chief complaint on file.      Initial /86 (BP Location: Left arm, Patient Position: Sitting)   Pulse 73   Temp 96.4  F (35.8  C) (Axillary)   Wt 99.2 kg (218 lb 11.1 oz)   SpO2 96%   BMI 32.30 kg/m   Estimated body mass index is 32.3 kg/m  as calculated from the following:    Height as of 12/21/20: 1.753 m (5' 9\").    Weight as of this encounter: 99.2 kg (218 lb 11.1 oz).  Medication Reconciliation: done    Sanjana Sanchez LPN      "

## 2021-01-27 ENCOUNTER — OFFICE VISIT (OUTPATIENT)
Dept: FAMILY MEDICINE | Facility: OTHER | Age: 51
End: 2021-01-27
Attending: FAMILY MEDICINE
Payer: COMMERCIAL

## 2021-01-27 VITALS
WEIGHT: 218 LBS | BODY MASS INDEX: 32.29 KG/M2 | SYSTOLIC BLOOD PRESSURE: 128 MMHG | DIASTOLIC BLOOD PRESSURE: 82 MMHG | HEIGHT: 69 IN | HEART RATE: 74 BPM | TEMPERATURE: 98.5 F | OXYGEN SATURATION: 98 %

## 2021-01-27 DIAGNOSIS — C91.10 CLL (CHRONIC LYMPHOCYTIC LEUKEMIA) (H): Primary | ICD-10-CM

## 2021-01-27 PROCEDURE — 99213 OFFICE O/P EST LOW 20 MIN: CPT | Performed by: FAMILY MEDICINE

## 2021-01-27 ASSESSMENT — MIFFLIN-ST. JEOR: SCORE: 1844.22

## 2021-01-27 ASSESSMENT — PAIN SCALES - GENERAL: PAINLEVEL: NO PAIN (0)

## 2021-01-27 NOTE — NURSING NOTE
"No chief complaint on file.      Initial /82 (BP Location: Right arm, Patient Position: Sitting, Cuff Size: Adult Large)   Pulse 74   Temp 98.5  F (36.9  C) (Tympanic)   Ht 1.753 m (5' 9\")   Wt 98.9 kg (218 lb)   SpO2 98%   BMI 32.19 kg/m   Estimated body mass index is 32.19 kg/m  as calculated from the following:    Height as of this encounter: 1.753 m (5' 9\").    Weight as of this encounter: 98.9 kg (218 lb).  Medication Reconciliation: complete  Lakia Gonzales LPN    "

## 2021-01-27 NOTE — PROGRESS NOTES
"  Assessment & Plan     CLL (chronic lymphocytic leukemia) (H)  Pt continues to do very well, essentially no symptoms. Stage 0. Plan is surveillance at this time. Offered recommendations regarding second opinions. He will call if he decides to move forward with this. Discussed updating vaccinations today, but will hold off on until physical due to currently undergoing COVID vaccination.       12 minutes spent on the date of the encounter doing chart review, review of test results, patient visit and documentation     Return in about 10 months (around 11/27/2021) for Physical.    Vanessa Hastings MD  [facility] - JOSE Otero is a 50 year old who presents to clinic today for the following health issues:     HPI       Concern - chronic lymphocytic leukemia   Onset: November   Description: Leukimia  Intensity: moderate  Progression of Symptoms:  NA  Accompanying Signs & Symptoms: NA  Previous history of similar problem: NA  Precipitating factors:        Worsened by: NA  Alleviating factors:        Improved by: NA  Therapies tried and outcome:  none     PET scan negative. Stage 0. Continues to feel very well. He is currently between doses of COVID vaccine which he tolerated well. He has questions about a second opinion which has been encouraged by his family.     Review of Systems   Constitutional, HEENT, cardiovascular, pulmonary, gi and gu systems are negative, except as otherwise noted.      Objective    /82 (BP Location: Right arm, Patient Position: Sitting, Cuff Size: Adult Large)   Pulse 74   Temp 98.5  F (36.9  C) (Tympanic)   Ht 1.753 m (5' 9\")   Wt 98.9 kg (218 lb)   SpO2 98%   BMI 32.19 kg/m    Body mass index is 32.19 kg/m .  Physical Exam   GENERAL: healthy, alert and no distress  NECK: no adenopathy, no asymmetry, masses, or scars and thyroid normal to palpation  RESP: lungs clear to auscultation - no rales, rhonchi or wheezes  CV: regular rates and rhythm, normal " S1 S2, no S3 or S4, no murmur, click or rub and no peripheral edema  MS: no gross musculoskeletal defects noted, no edema  NEURO: Normal strength and tone, mentation intact and speech normal  PSYCH: mentation appears normal, affect normal/bright    No results found for any visits on 01/27/21.

## 2021-02-01 LAB — COPATH REPORT: NORMAL

## 2021-02-05 ENCOUNTER — TELEPHONE (OUTPATIENT)
Dept: ONCOLOGY | Facility: OTHER | Age: 51
End: 2021-02-05

## 2021-02-05 NOTE — TELEPHONE ENCOUNTER
Clinic Care Coordination Contact  Care Team Conversations    TC to patient to give results for CLL NGS. Informed it was negative and will just surveille at this time. He verbalizes understanding.  Lori Lopes RN Oncology Care Coordinator

## 2021-04-11 DIAGNOSIS — K21.9 GASTROESOPHAGEAL REFLUX DISEASE WITHOUT ESOPHAGITIS: ICD-10-CM

## 2021-04-12 NOTE — TELEPHONE ENCOUNTER
Omeprazole 40 mg      Last Written Prescription Date:  4/17/20  Last Fill Quantity: 90,   # refills: 3  Last Office Visit: 1/27/21  Future Office visit:    Next 5 appointments (look out 90 days)    Apr 26, 2021  9:00 AM  (Arrive by 8:45 AM)  Return Visit with Rm Erickson MD  [facility] ) [address] [place] [id number]  [phone]           Routing refill request to provider for review/approval because:

## 2021-04-13 RX ORDER — OMEPRAZOLE 40 MG/1
CAPSULE, DELAYED RELEASE ORAL
Qty: 90 CAPSULE | Refills: 3 | Status: SHIPPED | OUTPATIENT
Start: 2021-04-13 | End: 2022-04-08

## 2021-04-26 ENCOUNTER — ONCOLOGY VISIT (OUTPATIENT)
Dept: ONCOLOGY | Facility: OTHER | Age: 51
End: 2021-04-26
Attending: INTERNAL MEDICINE
Payer: COMMERCIAL

## 2021-04-26 VITALS
HEART RATE: 71 BPM | TEMPERATURE: 97.8 F | SYSTOLIC BLOOD PRESSURE: 110 MMHG | RESPIRATION RATE: 20 BRPM | HEIGHT: 69 IN | WEIGHT: 217.59 LBS | OXYGEN SATURATION: 98 % | DIASTOLIC BLOOD PRESSURE: 78 MMHG | BODY MASS INDEX: 32.23 KG/M2

## 2021-04-26 DIAGNOSIS — C91.10 CLL (CHRONIC LYMPHOCYTIC LEUKEMIA) (H): ICD-10-CM

## 2021-04-26 DIAGNOSIS — R91.8 PULMONARY NODULES: Primary | ICD-10-CM

## 2021-04-26 LAB
ALBUMIN SERPL-MCNC: 3.9 G/DL (ref 3.4–5)
ALP SERPL-CCNC: 57 U/L (ref 40–150)
ALT SERPL W P-5'-P-CCNC: 37 U/L (ref 0–70)
ANION GAP SERPL CALCULATED.3IONS-SCNC: 5 MMOL/L (ref 3–14)
AST SERPL W P-5'-P-CCNC: 20 U/L (ref 0–45)
BASOPHILS # BLD AUTO: 0 10E9/L (ref 0–0.2)
BASOPHILS NFR BLD AUTO: 0 %
BILIRUB SERPL-MCNC: 0.7 MG/DL (ref 0.2–1.3)
BUN SERPL-MCNC: 17 MG/DL (ref 7–30)
CALCIUM SERPL-MCNC: 8.8 MG/DL (ref 8.5–10.1)
CHLORIDE SERPL-SCNC: 107 MMOL/L (ref 94–109)
CO2 SERPL-SCNC: 25 MMOL/L (ref 20–32)
CREAT SERPL-MCNC: 0.8 MG/DL (ref 0.66–1.25)
DIFFERENTIAL METHOD BLD: ABNORMAL
EOSINOPHIL # BLD AUTO: 0 10E9/L (ref 0–0.7)
EOSINOPHIL NFR BLD AUTO: 0 %
ERYTHROCYTE [DISTWIDTH] IN BLOOD BY AUTOMATED COUNT: 13.3 % (ref 10–15)
GFR SERPL CREATININE-BSD FRML MDRD: >90 ML/MIN/{1.73_M2}
GLUCOSE SERPL-MCNC: 106 MG/DL (ref 70–99)
HCT VFR BLD AUTO: 47 % (ref 40–53)
HGB BLD-MCNC: 16 G/DL (ref 13.3–17.7)
LDH SERPL L TO P-CCNC: 174 U/L (ref 85–227)
LYMPHOCYTES # BLD AUTO: 11.9 10E9/L (ref 0.8–5.3)
LYMPHOCYTES NFR BLD AUTO: 76 %
MCH RBC QN AUTO: 30.4 PG (ref 26.5–33)
MCHC RBC AUTO-ENTMCNC: 34 G/DL (ref 31.5–36.5)
MCV RBC AUTO: 89 FL (ref 78–100)
MONOCYTES # BLD AUTO: 0 10E9/L (ref 0–1.3)
MONOCYTES NFR BLD AUTO: 0 %
NEUTROPHILS # BLD AUTO: 3.8 10E9/L (ref 1.6–8.3)
NEUTROPHILS NFR BLD AUTO: 24 %
PLATELET # BLD AUTO: 189 10E9/L (ref 150–450)
PLATELET # BLD EST: ABNORMAL 10*3/UL
POTASSIUM SERPL-SCNC: 3.9 MMOL/L (ref 3.4–5.3)
PROT SERPL-MCNC: 7.3 G/DL (ref 6.8–8.8)
RBC # BLD AUTO: 5.27 10E12/L (ref 4.4–5.9)
RBC MORPH BLD: ABNORMAL
SMUDGE CELLS BLD QL SMEAR: PRESENT
SODIUM SERPL-SCNC: 137 MMOL/L (ref 133–144)
WBC # BLD AUTO: 15.7 10E9/L (ref 4–11)

## 2021-04-26 PROCEDURE — 85025 COMPLETE CBC W/AUTO DIFF WBC: CPT | Performed by: INTERNAL MEDICINE

## 2021-04-26 PROCEDURE — 80053 COMPREHEN METABOLIC PANEL: CPT | Performed by: INTERNAL MEDICINE

## 2021-04-26 PROCEDURE — 36415 COLL VENOUS BLD VENIPUNCTURE: CPT | Performed by: INTERNAL MEDICINE

## 2021-04-26 PROCEDURE — 83615 LACTATE (LD) (LDH) ENZYME: CPT | Performed by: INTERNAL MEDICINE

## 2021-04-26 PROCEDURE — 99215 OFFICE O/P EST HI 40 MIN: CPT | Performed by: INTERNAL MEDICINE

## 2021-04-26 ASSESSMENT — PAIN SCALES - GENERAL: PAINLEVEL: NO PAIN (0)

## 2021-04-26 ASSESSMENT — MIFFLIN-ST. JEOR: SCORE: 1842.38

## 2021-04-26 NOTE — PATIENT INSTRUCTIONS
We would like to see you back in 3 months with CT chest and labs prior to follow up appointment.    When you are in need of a refill of your medications, please call your pharmacy and they will send us the request. If you have any questions please call [phone]

## 2021-04-26 NOTE — NURSING NOTE
"Chief Complaint   Patient presents with     RECHECK     Follow up CLL (chronic lymphocytic leukemia)        Initial /78   Pulse 71   Temp 97.8  F (36.6  C) (Tympanic)   Resp 20   Ht 1.753 m (5' 9\")   Wt 98.7 kg (217 lb 9.5 oz)   SpO2 98%   BMI 32.13 kg/m   Estimated body mass index is 32.13 kg/m  as calculated from the following:    Height as of this encounter: 1.753 m (5' 9\").    Weight as of this encounter: 98.7 kg (217 lb 9.5 oz).  Medication Reconciliation: complete.  Immunizations and advance directives status reviewed. Pain scale =0 , PHQ-2=0.            Olimpia De Santiago LPN    "

## 2021-04-26 NOTE — PROGRESS NOTES
Visit Date: 04/26/2021    HISTORY OF PRESENT ILLNESS:  Mr. Becker returns for followup of lymphocytosis and history of lung nodules.  We had seen the patient in consultation at the request of Dr. Hastings on 09/21/2020.  At that time, Mr. Becker was a 50-year-old white male with history of tobacco abuse we were asked to evaluate concerning diagnosis lymphocytosis and a pulmonary nodule.  Apparently presented to the Emergency Room with right-sided abdominal pain, was found to have right ureteral stone with mild hydronephrosis.  The patient was given IV fluids.    at the same time, a CBC was drawn and revealed white count 17.5 H and H 15.6 and 46.3, platelet count was 241.  Peripheral blood smear was ordered and it was read as mild lymphocytosis, mild toxic neutrophilia, which may be associated with chronic lymphocytic leukemia, well differentiated lymphocytic lymphoma.  The patient also had a CT chest done 12/10/2020 which was read as multiple small noncalcified pulmonary nodules. These were mainly in the right and left lower lobes were all measured 6 mm, as well as the patient did have a history of tobacco abuse in the past and quit 13 years ago.  He was in [place]. He was a war .  He was in the [employer].  He denied any fevers, night sweats, weight loss, bone pain.  When we saw the patient, we wanted to rule out CLL by obtaining peripheral blood flow cytometry.  This was obtained and revealed the patient has 37% cells CD5 positive, kappa monotypic B cells, clonal B cells were negative for CD38, negative for CD49.  This was all consistent with CLL.  We also ordered a PET scan, which was essentially negative.  Lung nodules seen on previous CT were not PET avid.  There as no lymphadenopathy or splenomegaly.  When we saw the patient the last time, 01/26/2021, we recommended FISH panel to rule out 17p deletion.  The patient was found to be 17p deletion negative.  Therefore, his prognosis was excellent and the plan was to  use a watch and wait approach.  He comes in today.  He is doing relatively well.  He has no complaints of fevers, night sweats, weight loss, abdominal pain, chest pain, headaches, bone pain.    PHYSICAL EXAMINATION:    GENERAL:  He is a middle-aged white male in no acute distress.  VITAL SIGNS:  Reveal blood pressure 110/78, pulse 71, respirations 20, temperature 97.8.  HEENT:  Atraumatic, normocephalic.  Oropharynx nonerythematous.  NECK:  Supple.  CHEST:  Clear to auscultation and percussion.  HEART:  Regular rhythm.  S1, S2 normal.  ABDOMEN:  Soft, normoactive bowel sounds.  No organomegaly.  LYMPHATICS: No cervical, supraclavicular, axillary or inguinal nodes.  EXTREMITIES:  No edema.  NEUROLOGIC:  Nonfocal.    LABORATORY DATA:   Laboratories reveal CBC with a white count of 15.7 and 76% lymphocytes, 24% neutrophils, H and H 16.0 and 47.0, platelet count 189.  LDH is 174, BUN 17, creatinine 0.8.  LFTs are normal.    IMPRESSION:   1.  Stage 0 chronic lymphocytic leukemia with no evidence of splenomegaly, anemia, thrombocytopenia or lymphadenopathy.  The patient had favorable cytogenetics.  Lymphocyte count remains relatively stable.  The plan is to continue surveillance.  We will see the patient in 3 months, repeat CBC, CMP, LDH.  2.  Pulmonary nodules.  PET scan was negative.  We will repeat CT scan in 3 months.    Sixty-two minutes were spent on this patient.  Time was spent reviewing the chart, reviewing lab work,  previous physician provider notes, performing history and physical, documenting history and physical and ordering CT chest and followup labs.    Rm Erickson MD        D: 2021   T: 2021   MT: [id number]    Name:     JIMI KATHLEEN  MRN:      [id number]        Account:    [id number]   :      1970           Visit Date: 2021     Document: [id number]    cc:  Vanessa Hastings MD

## 2021-05-23 ENCOUNTER — HEALTH MAINTENANCE LETTER (OUTPATIENT)
Age: 51
End: 2021-05-23

## 2021-06-16 PROBLEM — C91.10 CLL (CHRONIC LYMPHOCYTIC LEUKEMIA) (H): Status: ACTIVE | Noted: 2020-12-21

## 2021-07-20 ENCOUNTER — LAB (OUTPATIENT)
Dept: LAB | Facility: OTHER | Age: 51
End: 2021-07-20
Attending: INTERNAL MEDICINE
Payer: COMMERCIAL

## 2021-07-20 ENCOUNTER — HOSPITAL ENCOUNTER (OUTPATIENT)
Dept: CT IMAGING | Facility: HOSPITAL | Age: 51
End: 2021-07-20
Attending: INTERNAL MEDICINE
Payer: COMMERCIAL

## 2021-07-20 DIAGNOSIS — C91.10 CLL (CHRONIC LYMPHOCYTIC LEUKEMIA) (H): ICD-10-CM

## 2021-07-20 DIAGNOSIS — R91.8 PULMONARY NODULES: ICD-10-CM

## 2021-07-20 LAB
ALBUMIN SERPL-MCNC: 3.9 G/DL (ref 3.4–5)
ALP SERPL-CCNC: 66 U/L (ref 40–150)
ALT SERPL W P-5'-P-CCNC: 39 U/L (ref 0–70)
ANION GAP SERPL CALCULATED.3IONS-SCNC: 5 MMOL/L (ref 3–14)
AST SERPL W P-5'-P-CCNC: 19 U/L (ref 0–45)
BASOPHILS # BLD AUTO: 0.1 10E3/UL (ref 0–0.2)
BASOPHILS NFR BLD AUTO: 0 %
BILIRUB SERPL-MCNC: 0.6 MG/DL (ref 0.2–1.3)
BUN SERPL-MCNC: 17 MG/DL (ref 7–30)
CALCIUM SERPL-MCNC: 8.6 MG/DL (ref 8.5–10.1)
CHLORIDE BLD-SCNC: 113 MMOL/L (ref 94–109)
CO2 SERPL-SCNC: 24 MMOL/L (ref 20–32)
CREAT SERPL-MCNC: 0.84 MG/DL (ref 0.66–1.25)
EOSINOPHIL # BLD AUTO: 0.1 10E3/UL (ref 0–0.7)
EOSINOPHIL NFR BLD AUTO: 1 %
ERYTHROCYTE [DISTWIDTH] IN BLOOD BY AUTOMATED COUNT: 13.4 % (ref 10–15)
GFR SERPL CREATININE-BSD FRML MDRD: >90 ML/MIN/1.73M2
GLUCOSE BLD-MCNC: 93 MG/DL (ref 70–99)
HCT VFR BLD AUTO: 45.3 % (ref 40–53)
HGB BLD-MCNC: 15.5 G/DL (ref 13.3–17.7)
IMM GRANULOCYTES # BLD: 0 10E3/UL
IMM GRANULOCYTES NFR BLD: 0 %
LDH SERPL L TO P-CCNC: 187 U/L (ref 85–227)
LYMPHOCYTES # BLD AUTO: 11.1 10E3/UL (ref 0.8–5.3)
LYMPHOCYTES NFR BLD AUTO: 68 %
MCH RBC QN AUTO: 30.4 PG (ref 26.5–33)
MCHC RBC AUTO-ENTMCNC: 34.2 G/DL (ref 31.5–36.5)
MCV RBC AUTO: 89 FL (ref 78–100)
MONOCYTES # BLD AUTO: 0.6 10E3/UL (ref 0–1.3)
MONOCYTES NFR BLD AUTO: 4 %
NEUTROPHILS # BLD AUTO: 4.5 10E3/UL (ref 1.6–8.3)
NEUTROPHILS NFR BLD AUTO: 27 %
NRBC # BLD AUTO: 0 10E3/UL
NRBC BLD AUTO-RTO: 0 /100
PLATELET # BLD AUTO: 216 10E3/UL (ref 150–450)
POTASSIUM BLD-SCNC: 3.9 MMOL/L (ref 3.4–5.3)
PROT SERPL-MCNC: 7.3 G/DL (ref 6.8–8.8)
RBC # BLD AUTO: 5.1 10E6/UL (ref 4.4–5.9)
SODIUM SERPL-SCNC: 142 MMOL/L (ref 133–144)
WBC # BLD AUTO: 16.4 10E3/UL (ref 4–11)

## 2021-07-20 PROCEDURE — 85025 COMPLETE CBC W/AUTO DIFF WBC: CPT

## 2021-07-20 PROCEDURE — 83615 LACTATE (LD) (LDH) ENZYME: CPT

## 2021-07-20 PROCEDURE — 36415 COLL VENOUS BLD VENIPUNCTURE: CPT

## 2021-07-20 PROCEDURE — 71250 CT THORAX DX C-: CPT

## 2021-07-20 PROCEDURE — 80053 COMPREHEN METABOLIC PANEL: CPT

## 2021-07-26 ENCOUNTER — ONCOLOGY VISIT (OUTPATIENT)
Dept: ONCOLOGY | Facility: OTHER | Age: 51
End: 2021-07-26
Attending: INTERNAL MEDICINE
Payer: COMMERCIAL

## 2021-07-26 VITALS
OXYGEN SATURATION: 96 % | RESPIRATION RATE: 16 BRPM | HEIGHT: 69 IN | HEART RATE: 76 BPM | BODY MASS INDEX: 32.1 KG/M2 | DIASTOLIC BLOOD PRESSURE: 88 MMHG | WEIGHT: 216.71 LBS | SYSTOLIC BLOOD PRESSURE: 130 MMHG | TEMPERATURE: 97.7 F

## 2021-07-26 DIAGNOSIS — C91.10 CLL (CHRONIC LYMPHOCYTIC LEUKEMIA) (H): Primary | ICD-10-CM

## 2021-07-26 PROCEDURE — 99213 OFFICE O/P EST LOW 20 MIN: CPT | Performed by: INTERNAL MEDICINE

## 2021-07-26 ASSESSMENT — PAIN SCALES - GENERAL: PAINLEVEL: NO PAIN (0)

## 2021-07-26 ASSESSMENT — MIFFLIN-ST. JEOR: SCORE: 1838.38

## 2021-07-26 NOTE — PROGRESS NOTES
Visit Date: 07/26/2021    HISTORY OF PRESENT ILLNESS:  Mr. Becker returns for followup of lymphocytosis, history of lung nodules.  We had seen the patient in consultation at the request of Dr. Hastings on 09/21/2020.  At that time, Mr. Becker was a 50-year-old white male with history of tobacco abuse, whom we were asked to evaluate concerning diagnosis lymphocytosis and pulmonary nodules.  Apparently, he presented to the Emergency Room with right sided abdominal pain, was found to have right ureteral stone with mild hydronephrosis.  The patient was given IV fluids.  At the same time, a CBC was drawn and revealed white count 17.5, H and H of 15.6 and 46.3, platelet count is 241.  Peripheral blood smear was ordered and was read as mild lymphocytosis, mild toxic neutrophilia, which may be associated with chronic lymphocytic leukemia, well-differentiated lymphocytic lymphoma.  The patient also had a CT chest done 12/10/2020, which was read as multiple small noncalcified pulmonary nodules.  These were mainly in the right and left lower lobes and all measuring 6 mm, as well as the patient did have a history of tobacco abuse in the past and quit 13 years ago.  He was in [place].  He was a War .  He was in the [employer].  He denies any fevers, night sweats, weight loss, bone pain.  When we saw the patient, we wanted to rule out CLL by obtaining peripheral blood flow cytometry.  This was obtained and revealed the patient has 37% cells CD5 positive, kappa monotypic B cells, clonal B cells were negative for CD38, negative for CD49.  This was all consistent with CLL.  We also ordered a PET scan, which was essentially negative.  Lung nodules seen on previous CT were not PET avid.  There was no lymphadenopathy or splenomegaly.  When we saw the patient on 01/26/2021, we recommended FISH panel to rule out 17p deletion.  The patient was found to be 17p deletion negative; therefore, prognosis was excellent and the plan was to watch and  wait.  The patient otherwise had repeat CT chest done on 2021 which was read as stable findings, multiple small pulmonary nodules measuring up to 6 mm and not changed significantly since 12/10/2020.  He recommended CT be repeated in 12 months.  Otherwise, the patient is doing well.  He denies any fevers, night sweats, weight loss, abdominal pain, chest pain, headaches, bone pain.    PHYSICAL EXAMINATION:    GENERAL:  He is a middle-aged white male in no acute distress.  VITAL SIGNS:  Reveal blood pressure 130/88, pulse 76, respirations 16, temperature 97.7.  HEENT:  Atraumatic, normocephalic.  Oropharynx nonerythematous.  NECK:  Supple.  LUNGS:  Clear to auscultation and percussion.  HEART:  Regular rhythm.  S1, S2 normal.  ABDOMEN:  Soft, normoactive bowel sounds, nondistended, nontender.  LYMPHATICS:  No cervical, supraclavicular, axillary or inguinal nodes.  EXTREMITIES:  No edema.  NEUROLOGIC:  Nonfocal.    LABORATORY DATA:  Reveal CBC with white count 16.4, H and H 15.5 and 45.3, and platelet count 216.  He has 11.1 absolute lymphocytes.  LDH is normal.  LFTs are normal.  BUN 17, creatinine 0.84.    IMPRESSION AND PLAN:    1.  Stage 0 chronic lymphocytic leukemia with relatively stable lymphocyte count.  Plan is to continue surveillance and see the patient in 3 months, obtain CBC, CMP, LDH.  2.  Pulmonary nodules.  PET scan was negative.  Repeat CT scan revealed stable nodules.  The plan is to repeat CT chest in 1 year.     Otherwise, 72 minutes were spent on this patient.  Time was spent reviewing imaging results, performing history and physical, documenting history and physical, reviewing lab work and ordering followup labs.    Rm Erickson MD        D: 2021   T: 2021   MT: [facility]    Name:     JIMI KATHLEEN  MRN:      [id number]        Account:    [id number]   :      1970           Visit Date: 2021     Document: [id number]    cc:  Vanessa Hastings MD

## 2021-09-12 ENCOUNTER — HEALTH MAINTENANCE LETTER (OUTPATIENT)
Age: 51
End: 2021-09-12

## 2021-10-26 ENCOUNTER — APPOINTMENT (OUTPATIENT)
Dept: LAB | Facility: OTHER | Age: 51
End: 2021-10-26
Attending: INTERNAL MEDICINE
Payer: COMMERCIAL

## 2021-10-26 ENCOUNTER — ONCOLOGY VISIT (OUTPATIENT)
Dept: ONCOLOGY | Facility: OTHER | Age: 51
End: 2021-10-26
Attending: INTERNAL MEDICINE
Payer: COMMERCIAL

## 2021-10-26 VITALS
HEART RATE: 81 BPM | OXYGEN SATURATION: 97 % | HEIGHT: 69 IN | WEIGHT: 223.33 LBS | DIASTOLIC BLOOD PRESSURE: 80 MMHG | SYSTOLIC BLOOD PRESSURE: 130 MMHG | TEMPERATURE: 97.6 F | RESPIRATION RATE: 20 BRPM | BODY MASS INDEX: 33.08 KG/M2

## 2021-10-26 DIAGNOSIS — C91.10 CLL (CHRONIC LYMPHOCYTIC LEUKEMIA) (H): ICD-10-CM

## 2021-10-26 LAB
ALBUMIN SERPL-MCNC: 3.6 G/DL (ref 3.4–5)
ALP SERPL-CCNC: 68 U/L (ref 40–150)
ALT SERPL W P-5'-P-CCNC: 36 U/L (ref 0–70)
ANION GAP SERPL CALCULATED.3IONS-SCNC: 4 MMOL/L (ref 3–14)
AST SERPL W P-5'-P-CCNC: 18 U/L (ref 0–45)
BILIRUB SERPL-MCNC: 0.6 MG/DL (ref 0.2–1.3)
BUN SERPL-MCNC: 19 MG/DL (ref 7–30)
CALCIUM SERPL-MCNC: 8.5 MG/DL (ref 8.5–10.1)
CHLORIDE BLD-SCNC: 109 MMOL/L (ref 94–109)
CO2 SERPL-SCNC: 26 MMOL/L (ref 20–32)
CREAT SERPL-MCNC: 0.74 MG/DL (ref 0.66–1.25)
ERYTHROCYTE [DISTWIDTH] IN BLOOD BY AUTOMATED COUNT: 13.1 % (ref 10–15)
GFR SERPL CREATININE-BSD FRML MDRD: >90 ML/MIN/1.73M2
GLUCOSE BLD-MCNC: 96 MG/DL (ref 70–99)
HCT VFR BLD AUTO: 46.2 % (ref 40–53)
HGB BLD-MCNC: 15.9 G/DL (ref 13.3–17.7)
LDH SERPL L TO P-CCNC: 197 U/L (ref 85–227)
MCH RBC QN AUTO: 30.5 PG (ref 26.5–33)
MCHC RBC AUTO-ENTMCNC: 34.4 G/DL (ref 31.5–36.5)
MCV RBC AUTO: 89 FL (ref 78–100)
NRBC # BLD AUTO: 0 10E3/UL
NRBC BLD AUTO-RTO: 0 /100
PLATELET # BLD AUTO: 219 10E3/UL (ref 150–450)
POTASSIUM BLD-SCNC: 3.8 MMOL/L (ref 3.4–5.3)
PROT SERPL-MCNC: 7.5 G/DL (ref 6.8–8.8)
RBC # BLD AUTO: 5.22 10E6/UL (ref 4.4–5.9)
SODIUM SERPL-SCNC: 139 MMOL/L (ref 133–144)
WBC # BLD AUTO: 17.7 10E3/UL (ref 4–11)

## 2021-10-26 PROCEDURE — 80053 COMPREHEN METABOLIC PANEL: CPT | Performed by: INTERNAL MEDICINE

## 2021-10-26 PROCEDURE — 85027 COMPLETE CBC AUTOMATED: CPT | Performed by: INTERNAL MEDICINE

## 2021-10-26 PROCEDURE — 36415 COLL VENOUS BLD VENIPUNCTURE: CPT | Performed by: INTERNAL MEDICINE

## 2021-10-26 PROCEDURE — 99213 OFFICE O/P EST LOW 20 MIN: CPT | Performed by: INTERNAL MEDICINE

## 2021-10-26 PROCEDURE — 83615 LACTATE (LD) (LDH) ENZYME: CPT | Performed by: INTERNAL MEDICINE

## 2021-10-26 ASSESSMENT — MIFFLIN-ST. JEOR: SCORE: 1868.38

## 2021-10-26 ASSESSMENT — PAIN SCALES - GENERAL: PAINLEVEL: NO PAIN (0)

## 2021-10-26 NOTE — PATIENT INSTRUCTIONS
We would like to see you back in 4 months. Please come 30minute(s) prior for lab work.      When you are in need of a refill of your medications, please call your pharmacy and they will send us the request. If you have any questions please call [phone]

## 2021-10-26 NOTE — NURSING NOTE
"Oncology Rooming Note    October 26, 2021 2:23 PM   Branden Becker is a 50 year old male who presents for:    Chief Complaint   Patient presents with     Oncology Clinic Visit     Follow up CLL (chronic lymphocytic leukemia)      Initial Vitals: /80   Pulse 81   Temp 97.6  F (36.4  C) (Tympanic)   Resp 20   Ht 1.753 m (5' 9\")   Wt 101.3 kg (223 lb 5.2 oz)   SpO2 97%   BMI 32.98 kg/m   Estimated body mass index is 32.98 kg/m  as calculated from the following:    Height as of this encounter: 1.753 m (5' 9\").    Weight as of this encounter: 101.3 kg (223 lb 5.2 oz). Body surface area is 2.22 meters squared.  No Pain (0) Comment: Data Unavailable   No LMP for male patient.  Allergies reviewed: Yes  Medications reviewed: Yes    Medications: Medication refills not needed today.  Pharmacy name entered into [facility]:    [facility] [address] [place], [place] - [address] [address]  [facility] [address] [place] [address] PH[facility]AC[id number] - [place], [place] - [address] [facility]          Olimpia De Santiago LPN            "

## 2021-10-26 NOTE — PROGRESS NOTES
Visit Date: 10/26/2021    HISTORY OF PRESENT ILLNESS:  Mr. Becker returns for followup of lymphocytosis history of lung nodules, we had seen the patient in consultation at the request of Dr. Hastings 09/21/2020.  At that time, Mr. Becker was a 50-year-old white male with history of tobacco abuse, we were asked to evaluate concerning diagnosis lymphocytosis and pulmonary nodules.  Apparently, he presented to the Emergency Room with right sided abdominal pain, was found to have ureteral stone with mild hydronephrosis.  The patient was given IV fluids at the same time, CBC was drawn and revealed a white count 17.5, H and H of 15.6 and 46.3, platelet count was 241.  Peripheral blood smear was ordered and was read as mild lymphocytosis, mild toxic neutrophilia, which may be associated with chronic lymphocytic leukemia, well-differentiated lymphocytic lymphoma.  The patient also had a CT chest done on 12/10/2020 which was read as multiple small noncalcified pulmonary nodules.  These were mainly in the right and left lower lobes and all measuring 6 mm, as well as the he patient did have a history of tobacco abuse in the past and quit 13 years ago.  He was in [place], he was a war .  He was in the [employer].  Denies any fevers, night sweats, weight loss or bone pain.  When we saw the patient, we wanted to rule out CLL by obtaining peripheral blood for flow cytometry.  This was obtained and revealed the patient has 37% CD5 positive kappa monotypic B cells, clonal B cells were negative for CD38, negative for CD49.  This was all consistent with CLL.  We also ordered a PET scan, which was essentially negative.  Lung nodules seen on previous CT were not PET avid.  There was no lymphadenopathy or splenomegaly.  When we saw the patient on 01/26/2021 recommend a FISH panel to rule out 17p deletion.  The patient was found to be 17p deletion negative; therefore, prognosis was excellent and the plan was to watch and wait.  The patient  otherwise had a repeat CT chest done on 2021 which was read as stable findings.  Multiple small pulmonary nodules measuring up to 6 mm and not changed significantly since 12/10/2020.  The patient did have a repeat CT chest done on 2021 of this year, which showed stable pulmonary nodules.  He is due to have another CT chest in 1 year.  Otherwise, he is doing well.  He offers no complaints of fevers, night sweats, weight loss, lymphadenopathy, fatigue.  Overall, he is doing well.    PHYSICAL EXAMINATION:    GENERAL:  He is a middle-aged white male in no acute distress.  VITAL SIGNS:  Reveal blood pressure 130/80, pulse 81, respirations 20, temperature 97.6.  HEENT:  Atraumatic, normocephalic.  Oropharynx nonerythematous.  NECK:  Supple.  LUNGS:  Clear to auscultation and percussion.  HEART:  Regular rhythm.  S1, S2 normal.  ABDOMEN:  Soft, normoactive bowel sounds, nondistended.  LYMPHATICS:  No cervical, supraclavicular, axillary or inguinal nodes.  EXTREMITIES:  No edema.  NEUROLOGIC:  Nonfocal.    LABORATORY DATA:  reveal CBC with white count 17.7, H and H 15.9 and 46.2, platelet count 219.  BUN 19, creatinine 0.74.  LDH is 197.    IMPRESSION:    Stage 0 chronic lymphocytic leukemia.  No evidence of lymphadenopathy or organomegaly.  No anemia, no thrombocytopenia.  The patient remains asymptomatic.  Plan is to continue surveillance.  We will see the patient in 4 months, repeat CBC, CMP, LDH.  He will be due for CT chest in July of next year.    62 minutes were spent on this patient.  Time was spent reviewing lab results, performing history and physical, documenting history and physical and ordering followup labs.    Rm Erickson MD        D: 10/26/2021   T: 10/26/2021   MT: [id number]    Name:     JIMI KATHLEEN  MRN:      [id number]        Account:    [id number]   :      1970           Visit Date: 10/26/2021     Document: [id number]    cc:  Vanessa Hastings MD

## 2021-10-28 LAB
BASOPHILS # BLD MANUAL: 0 10E3/UL (ref 0–0.2)
BASOPHILS NFR BLD MANUAL: 0 %
EOSINOPHIL # BLD MANUAL: 0.2 10E3/UL (ref 0–0.7)
EOSINOPHIL NFR BLD MANUAL: 1 %
LYMPHOCYTES # BLD MANUAL: 11 10E3/UL (ref 0.8–5.3)
LYMPHOCYTES NFR BLD MANUAL: 62 %
MONOCYTES # BLD MANUAL: 0.7 10E3/UL (ref 0–1.3)
MONOCYTES NFR BLD MANUAL: 4 %
NEUTROPHILS # BLD MANUAL: 5.8 10E3/UL (ref 1.6–8.3)
NEUTROPHILS NFR BLD MANUAL: 33 %
PATH REV: ABNORMAL
PLAT MORPH BLD: ABNORMAL
RBC MORPH BLD: ABNORMAL

## 2021-11-27 NOTE — PATIENT INSTRUCTIONS
"Check with your insurance or pharmacist about the new shingles vaccine (Shingrix) - if it is covered and you wish to return for it you can make a nurse only visit. Remember it is 2 shots, the first at time \"Zero\" and the second 2-6 months later.       Preventive Health Recommendations  Male Ages 50 - 64    Yearly exam:             See your health care provider every year in order to  o   Review health changes.   o   Discuss preventive care.    o   Review your medicines if your doctor has prescribed any.     Have a cholesterol test every 5 years, or more frequently if you are at risk for high cholesterol/heart disease.     Have a diabetes test (fasting glucose) every three years. If you are at risk for diabetes, you should have this test more often.     Have a colonoscopy at age 50, or have a yearly FIT test (stool test). These exams will check for colon cancer.      Talk with your health care provider about whether or not a prostate cancer screening test (PSA) is right for you.    You should be tested each year for STDs (sexually transmitted diseases), if you re at risk.     Shots: Get a flu shot each year. Get a tetanus shot every 10 years.     Nutrition:    Eat at least 5 servings of fruits and vegetables daily.     Eat whole-grain bread, whole-wheat pasta and brown rice instead of white grains and rice.     Get adequate Calcium and Vitamin D.     Lifestyle    Exercise for at least 150 minutes a week (30 minutes a day, 5 days a week). This will help you control your weight and prevent disease.     Limit alcohol to one drink per day.     No smoking.     Wear sunscreen to prevent skin cancer.     See your dentist every six months for an exam and cleaning.     See your eye doctor every 1 to 2 years.    "

## 2021-11-27 NOTE — PROGRESS NOTES
SUBJECTIVE:   CC: Branden Becker is an 51 year old male who presents for preventative health visit.       Healthy Habits:     Getting at least 3 servings of Calcium per day:  NO    Bi-annual eye exam:  Yes    Dental care twice a year:  Yes    Sleep apnea or symptoms of sleep apnea:  Daytime drowsiness    Diet:  Other    Frequency of exercise:  2-3 days/week    Duration of exercise:  30-45 minutes    Taking medications regularly:  Yes    PHQ-2 Total Score: 0    Additional concerns today:  Yes      Today's PHQ-2 Score:   PHQ-2 ( 1999 Pfizer) 11/27/2021   Q1: Little interest or pleasure in doing things 0   Q2: Feeling down, depressed or hopeless 0   PHQ-2 Score 0   PHQ-2 Total Score (12-17 Years)- Positive if 3 or more points; Administer PHQ-A if positive -   Q1: Little interest or pleasure in doing things Not at all   Q2: Feeling down, depressed or hopeless Not at all   PHQ-2 Score 0     Abuse: Current or Past(Physical, Sexual or Emotional)- No  Do you feel safe in your environment? Yes    Social History     Tobacco Use     Smoking status: Former Smoker     Types: Cigarettes     Quit date: 1/1/2006     Years since quitting: 15.9     Smokeless tobacco: Never Used   Substance Use Topics     Alcohol use: Yes     Comment: a case a year at the most.      If you drink alcohol do you typically have >3 drinks per day or >7 drinks per week? No    No flowsheet data found.    Last PSA: No results found for: PSA    Reviewed orders with patient. Reviewed health maintenance and updated orders accordingly - Yes    Cancer Screenings:  Colon - Due this year.   Cervical - NA  Breast - NA  Lung - NA  Prostate - Pending, no LUTS  Skin - No lesions of concern.     Immunizations:  Tdap - UTD  Zoster - shingrix discussed, consider at pharmacy.   Influenza - UTD  Prevnar - NA  Pneumovax - Can get due to CLL dx.   COVID - 2/3 - Booster discussed    Cardiovascular:  Fasting glucose/Hgb A1C: pending  Cholesterol: pending  ASCVD score:  "    Oncology notes from 10/26/2021:   IMPRESSION:    Stage 0 chronic lymphocytic leukemia.  No evidence of lymphadenopathy or organomegaly.  No anemia, no thrombocytopenia.  The patient remains asymptomatic.  Plan is to continue surveillance.  We will see the patient in 4 months, repeat CBC, CMP, LDH.  He will be due for CT chest in July of next year.    Reviewed and updated as needed this visit by clinical staff  Tobacco  Allergies  Meds  Problems  Med Hx  Surg Hx  Fam Hx  Soc Hx         Reviewed and updated as needed this visit by Provider  Tobacco  Allergies  Meds  Problems  Med Hx  Surg Hx  Fam Hx          Review of Systems  CONSTITUTIONAL: NEGATIVE for fever, chills, change in weight  INTEGUMENTARY/SKIN: NEGATIVE for worrisome rashes, moles or lesions  EYES: NEGATIVE for vision changes or irritation  ENT: NEGATIVE for ear, mouth and throat problems  RESP: NEGATIVE for significant cough or SOB  CV: NEGATIVE for chest pain, palpitations or peripheral edema  GI: NEGATIVE for nausea, abdominal pain, heartburn, or change in bowel habits   male: negative for dysuria, hematuria, decreased urinary stream, erectile dysfunction, urethral discharge  MUSCULOSKELETAL: NEGATIVE for significant arthralgias or myalgia  NEURO: NEGATIVE for weakness, dizziness or paresthesias  PSYCHIATRIC: NEGATIVE for changes in mood or affect    OBJECTIVE:   /70 (BP Location: Left arm, Patient Position: Chair, Cuff Size: Adult Large)   Pulse 73   Temp 98.5  F (36.9  C) (Tympanic)   Resp 16   Ht 1.753 m (5' 9\")   Wt 102.1 kg (225 lb)   SpO2 97%   BMI 33.23 kg/m      Physical Exam  GENERAL: healthy, alert and no distress  EYES: Eyes grossly normal to inspection, PERRL and conjunctivae and sclerae normal  HENT: ear canals and TM's normal, nose and mouth without ulcers or lesions  NECK: no adenopathy, no asymmetry, masses, or scars and thyroid normal to palpation  RESP: lungs clear to auscultation - no rales, rhonchi " "or wheezes  CV: regular rate and rhythm, normal S1 S2, no S3 or S4, no murmur, click or rub, no peripheral edema and peripheral pulses strong  ABDOMEN: soft, nontender, no hepatosplenomegaly, no masses and bowel sounds normal  MS: no gross musculoskeletal defects noted, no edema  SKIN: no suspicious lesions or rashes  NEURO: Normal strength and tone, mentation intact and speech normal  PSYCH: mentation appears normal, affect normal/bright    Diagnostic Test Results:  Labs reviewed in Epic  No results found for any visits on 11/30/21.    ASSESSMENT/PLAN:   Branden was seen today for physical.    Diagnoses and all orders for this visit:    Routine general medical examination at a health care facility  See HCM above.   -     Lipid Profile (Chol, Trig, HDL, LDL calc); Future  -     Glucose; Future  -     PSA, screen; Future    Gastroesophageal reflux disease without esophagitis  Controlled with ppi.     CLL (chronic lymphocytic leukemia) (H)  Stable, stage 0.     Infection due to 2019 novel coronavirus  Asymptomatic, pt is vaccinated. Lungs clear, exam otherwise reassuring. Out of quarantine window. Okay for travel.     Snoring  Sleep eval pending  -     SLEEP EVALUATION & MANAGEMENT REFERRAL - ADULT -; Future    Special screening for malignant neoplasms, colon  No FH, no GI sx.   -     COLOGUARD([employer])    Patient has been advised of split billing requirements and indicates understanding: Yes  COUNSELING:   Reviewed preventive health counseling, as reflected in patient instructions    Estimated body mass index is 33.23 kg/m  as calculated from the following:    Height as of this encounter: 1.753 m (5' 9\").    Weight as of this encounter: 102.1 kg (225 lb).     He reports that he quit smoking about 15 years ago. His smoking use included cigarettes. He has never used smokeless tobacco.      Counseling Resources:  ATP IV Guidelines  Pooled Cohorts Equation Calculator  FRAX Risk Assessment  ICSI Preventive " Guidelines  Dietary Guidelines for Americans, 2010  USDA's MyPlate  ASA Prophylaxis  Lung CA Screening    Vanessa Hastings MD  [facility]

## 2021-11-30 ENCOUNTER — OFFICE VISIT (OUTPATIENT)
Dept: FAMILY MEDICINE | Facility: OTHER | Age: 51
End: 2021-11-30
Attending: FAMILY MEDICINE
Payer: COMMERCIAL

## 2021-11-30 VITALS
HEART RATE: 73 BPM | BODY MASS INDEX: 33.33 KG/M2 | TEMPERATURE: 98.5 F | HEIGHT: 69 IN | DIASTOLIC BLOOD PRESSURE: 70 MMHG | SYSTOLIC BLOOD PRESSURE: 130 MMHG | RESPIRATION RATE: 16 BRPM | WEIGHT: 225 LBS | OXYGEN SATURATION: 97 %

## 2021-11-30 DIAGNOSIS — Z00.00 ROUTINE GENERAL MEDICAL EXAMINATION AT A HEALTH CARE FACILITY: Primary | ICD-10-CM

## 2021-11-30 DIAGNOSIS — U07.1 INFECTION DUE TO 2019 NOVEL CORONAVIRUS: ICD-10-CM

## 2021-11-30 DIAGNOSIS — Z12.11 SPECIAL SCREENING FOR MALIGNANT NEOPLASMS, COLON: ICD-10-CM

## 2021-11-30 DIAGNOSIS — R06.83 SNORING: ICD-10-CM

## 2021-11-30 DIAGNOSIS — K21.9 GASTROESOPHAGEAL REFLUX DISEASE WITHOUT ESOPHAGITIS: ICD-10-CM

## 2021-11-30 DIAGNOSIS — C91.10 CLL (CHRONIC LYMPHOCYTIC LEUKEMIA) (H): ICD-10-CM

## 2021-11-30 PROCEDURE — 99396 PREV VISIT EST AGE 40-64: CPT | Performed by: FAMILY MEDICINE

## 2021-11-30 ASSESSMENT — MIFFLIN-ST. JEOR: SCORE: 1870.97

## 2021-11-30 ASSESSMENT — PAIN SCALES - GENERAL: PAINLEVEL: NO PAIN (0)

## 2021-11-30 NOTE — LETTER
2021          To whom it may concern,         Branden Becker (: 1970) is a patient at this clinic. He has tested positive for COVID 19 on 11/15/2021. He has remained asymptomatic related to this infection and is fully vaccinated. He was seen on this date for a physical examination and has been deemed fit for travel at this time.                Vanessa Hastings MD

## 2021-12-13 LAB — COLOGUARD-ABSTRACT: NEGATIVE

## 2022-02-28 ENCOUNTER — ONCOLOGY VISIT (OUTPATIENT)
Dept: ONCOLOGY | Facility: OTHER | Age: 52
End: 2022-02-28
Attending: INTERNAL MEDICINE
Payer: COMMERCIAL

## 2022-02-28 ENCOUNTER — APPOINTMENT (OUTPATIENT)
Dept: LAB | Facility: OTHER | Age: 52
End: 2022-02-28
Attending: INTERNAL MEDICINE
Payer: COMMERCIAL

## 2022-02-28 VITALS
TEMPERATURE: 97.9 F | SYSTOLIC BLOOD PRESSURE: 120 MMHG | DIASTOLIC BLOOD PRESSURE: 80 MMHG | OXYGEN SATURATION: 96 % | HEART RATE: 74 BPM | RESPIRATION RATE: 20 BRPM | WEIGHT: 223.77 LBS | HEIGHT: 69 IN | BODY MASS INDEX: 33.14 KG/M2

## 2022-02-28 DIAGNOSIS — R91.8 PULMONARY NODULES: Primary | ICD-10-CM

## 2022-02-28 DIAGNOSIS — C91.10 CLL (CHRONIC LYMPHOCYTIC LEUKEMIA) (H): ICD-10-CM

## 2022-02-28 LAB
ALBUMIN SERPL-MCNC: 3.8 G/DL (ref 3.4–5)
ALP SERPL-CCNC: 58 U/L (ref 40–150)
ALT SERPL W P-5'-P-CCNC: 53 U/L (ref 0–70)
ANION GAP SERPL CALCULATED.3IONS-SCNC: 7 MMOL/L (ref 3–14)
AST SERPL W P-5'-P-CCNC: 20 U/L (ref 0–45)
BASOPHILS # BLD MANUAL: 0 10E3/UL (ref 0–0.2)
BASOPHILS NFR BLD MANUAL: 0 %
BILIRUB SERPL-MCNC: 0.6 MG/DL (ref 0.2–1.3)
BUN SERPL-MCNC: 16 MG/DL (ref 7–30)
CALCIUM SERPL-MCNC: 8.9 MG/DL (ref 8.5–10.1)
CHLORIDE BLD-SCNC: 110 MMOL/L (ref 94–109)
CO2 SERPL-SCNC: 23 MMOL/L (ref 20–32)
CREAT SERPL-MCNC: 0.75 MG/DL (ref 0.66–1.25)
EOSINOPHIL # BLD MANUAL: 0 10E3/UL (ref 0–0.7)
EOSINOPHIL NFR BLD MANUAL: 0 %
ERYTHROCYTE [DISTWIDTH] IN BLOOD BY AUTOMATED COUNT: 13.9 % (ref 10–15)
GFR SERPL CREATININE-BSD FRML MDRD: >90 ML/MIN/1.73M2
GLUCOSE BLD-MCNC: 111 MG/DL (ref 70–99)
HCT VFR BLD AUTO: 46.8 % (ref 40–53)
HGB BLD-MCNC: 15.9 G/DL (ref 13.3–17.7)
LDH SERPL L TO P-CCNC: 179 U/L (ref 85–227)
LYMPHOCYTES # BLD MANUAL: 10.1 10E3/UL (ref 0.8–5.3)
LYMPHOCYTES NFR BLD MANUAL: 67 %
MCH RBC QN AUTO: 30.4 PG (ref 26.5–33)
MCHC RBC AUTO-ENTMCNC: 34 G/DL (ref 31.5–36.5)
MCV RBC AUTO: 90 FL (ref 78–100)
MONOCYTES # BLD MANUAL: 0 10E3/UL (ref 0–1.3)
MONOCYTES NFR BLD MANUAL: 0 %
NEUTROPHILS # BLD MANUAL: 5 10E3/UL (ref 1.6–8.3)
NEUTROPHILS NFR BLD MANUAL: 33 %
PLAT MORPH BLD: ABNORMAL
PLATELET # BLD AUTO: 156 10E3/UL (ref 150–450)
POTASSIUM BLD-SCNC: 3.5 MMOL/L (ref 3.4–5.3)
PROT SERPL-MCNC: 7 G/DL (ref 6.8–8.8)
RBC # BLD AUTO: 5.23 10E6/UL (ref 4.4–5.9)
RBC MORPH BLD: ABNORMAL
SODIUM SERPL-SCNC: 140 MMOL/L (ref 133–144)
WBC # BLD AUTO: 15 10E3/UL (ref 4–11)

## 2022-02-28 PROCEDURE — 83615 LACTATE (LD) (LDH) ENZYME: CPT | Performed by: INTERNAL MEDICINE

## 2022-02-28 PROCEDURE — 99214 OFFICE O/P EST MOD 30 MIN: CPT | Performed by: INTERNAL MEDICINE

## 2022-02-28 PROCEDURE — 80053 COMPREHEN METABOLIC PANEL: CPT | Performed by: INTERNAL MEDICINE

## 2022-02-28 PROCEDURE — 36415 COLL VENOUS BLD VENIPUNCTURE: CPT | Performed by: INTERNAL MEDICINE

## 2022-02-28 PROCEDURE — 85027 COMPLETE CBC AUTOMATED: CPT | Performed by: INTERNAL MEDICINE

## 2022-02-28 ASSESSMENT — PAIN SCALES - GENERAL: PAINLEVEL: MILD PAIN (3)

## 2022-02-28 NOTE — PROGRESS NOTES
Spoke with patient and he will come in for a nurse visit tomorrow to schedule a knee scope.     Visit Date: 02/28/2022    HISTORY OF PRESENT ILLNESS:  Mr. Becker returns for followup of stage 0 CLL with a history of lung nodules.  We had seen the patient in consultation at the request of Dr. Hastings on 09/21/2020.  At that time, Mr. Becker was a 50-year-old white male with a history of tobacco abuse, who we were asked to evaluate concerning a diagnosis lymphocytosis and pulmonary nodules.  He had presented to the Emergency Room with right sided abdominal pain and was found to have a ureteral stone with mild hydronephrosis.  The patient was given IV fluids.  At the same time, a CBC was drawn and revealed a white count 17.5, H and H of 15.6 and 46.3.  Platelet count was 241,000.  Peripheral blood smear was ordered and was read as mild lymphocytosis, mild toxic neutrophilia, which may be associated with chronic lymphocytic leukemia, well-differentiated lymphocytic lymphoma.  The patient also had a CT chest done on 12/10/2020, which was read as multiple small noncalcified pulmonary nodules.  These were mainly in the right and left lower lobes and all measured 6 mm.  As well, the patient did have a history of tobacco abuse in the past and quit 13 years ago.  He was in [place], he was a war .  He was in the [employer].  He denied any fevers, night sweats, weight loss or bone pain.  When we saw the patient, we wanted to rule out CLL by obtaining a peripheral blood for flow cytometry.  This was obtained and revealed the patient had 37% CD5 positive kappa monotypic B cells.  This was all consistent with CLL.  We also ordered a PET scan, which was essentially negative.  Lung nodules seen on previous CT were not PET avid.  There was no lymphadenopathy or splenomegaly.  When we saw the patient on 01/26/2021, we recommended a FISH panel to rule out 17p deletion.  The patient was found to be 17p deletion negative; therefore, his prognosis was excellent and the plan was to watch and wait.  The patient had a repeat CT chest  done on 2021, which was read as stable findings.      He comes in today.  He is doing relatively well.  He offers no complaints of shortness of breath, chest pain, abdominal pain, fevers, night sweats, or weight loss.    PHYSICAL EXAMINATION:    GENERAL:  He is a well-developed, well-nourished, middle-aged white male in no acute distress.  VITAL SIGNS:  Reveal blood pressure 120/80, pulse 74, respirations 20, temperature 97.9.  HEENT:  Atraumatic, normocephalic.  Oropharynx nonerythematous.  NECK:  Supple.  LUNGS:  Clear to auscultation and percussion.  HEART:  Regular rhythm.  S1, S2 normal.  ABDOMEN:  Soft, normoactive bowel sounds.  No splenomegaly.  LYMPHATICS:  No cervical, supraclavicular, axillary or inguinal nodes.  EXTREMITIES:  Without edema.  NEUROLOGIC:  Nonfocal.    LABORATORY DATA:  Reveal CBC with white count 15.0 with 67% lymphocytes.  H and H is 15.9 and 46.8, platelet count is 156,000.  LDH is 179.  LFTs are normal.    IMPRESSION:  Stage 0 chronic lymphocytic leukemia.  No evidence of lymphadenopathy or organomegaly.  No anemia or thrombocytopenia.  Leukocytosis has improved.  Plan is to continue surveillance.  We will see the patient in 6 months and repeat CBC, CMP, LDH, and repeat CT chest.    TIME SPENT:  Sixty-eight minutes were spent on this patient.  Time was spent reviewing lab results with the patient, reviewing previous radiology results including CT chest imaging, performing history and physical, documenting history and physical and ordering followup labs.    Rm Erickson MD        D: 2022   T: 2022   MT: [id number]    Name:     JIMI KATHLEEN  MRN:      [id number]        Account:    [id number]   :      1970           Visit Date: 2022     Document: [id number]

## 2022-02-28 NOTE — NURSING NOTE
"Oncology Rooming Note    February 28, 2022 2:31 PM   Branden Becker is a 51 year old male who presents for:    Chief Complaint   Patient presents with     Oncology Clinic Visit     Follow up CLL (chronic lymphocytic leukemia)      Initial Vitals: /80   Pulse 74   Temp 97.9  F (36.6  C) (Tympanic)   Resp 20   Ht 1.753 m (5' 9\")   Wt 101.5 kg (223 lb 12.3 oz)   SpO2 96%   BMI 33.04 kg/m   Estimated body mass index is 33.04 kg/m  as calculated from the following:    Height as of this encounter: 1.753 m (5' 9\").    Weight as of this encounter: 101.5 kg (223 lb 12.3 oz). Body surface area is 2.22 meters squared.  Mild Pain (3) Comment: Data Unavailable   No LMP for male patient.  Allergies reviewed: Yes  Medications reviewed: Yes    Medications: Medication refills not needed today.  Pharmacy name entered into [facility]:    [facility] [address] [place], [place] - [address] [address]  [facility] - [address] [facility] - [place], [place] - [address] [facility]          Olimpia De Santiago LPN            "

## 2022-02-28 NOTE — PATIENT INSTRUCTIONS
We would like to see you back in 6 months. Please come 30minute(s) prior for lab work.      When you are in need of a refill of your medications, please call your pharmacy and they will send us the request. If you have any questions please call [phone]

## 2022-04-07 DIAGNOSIS — K21.9 GASTROESOPHAGEAL REFLUX DISEASE WITHOUT ESOPHAGITIS: ICD-10-CM

## 2022-04-08 RX ORDER — OMEPRAZOLE 40 MG/1
CAPSULE, DELAYED RELEASE ORAL
Qty: 90 CAPSULE | Refills: 1 | Status: SHIPPED | OUTPATIENT
Start: 2022-04-08 | End: 2022-10-05

## 2022-08-25 ENCOUNTER — HOSPITAL ENCOUNTER (OUTPATIENT)
Dept: CT IMAGING | Facility: HOSPITAL | Age: 52
Discharge: HOME | End: 2022-08-25
Attending: INTERNAL MEDICINE | Admitting: INTERNAL MEDICINE
Payer: COMMERCIAL

## 2022-08-25 DIAGNOSIS — R91.8 PULMONARY NODULES: ICD-10-CM

## 2022-08-25 DIAGNOSIS — C91.10 CLL (CHRONIC LYMPHOCYTIC LEUKEMIA) (H): ICD-10-CM

## 2022-08-25 PROCEDURE — 71250 CT THORAX DX C-: CPT

## 2022-08-29 ENCOUNTER — ONCOLOGY VISIT (OUTPATIENT)
Dept: ONCOLOGY | Facility: OTHER | Age: 52
End: 2022-08-29
Attending: INTERNAL MEDICINE
Payer: COMMERCIAL

## 2022-08-29 ENCOUNTER — LAB (OUTPATIENT)
Dept: LAB | Facility: OTHER | Age: 52
End: 2022-08-29
Attending: INTERNAL MEDICINE
Payer: COMMERCIAL

## 2022-08-29 VITALS
OXYGEN SATURATION: 96 % | BODY MASS INDEX: 32.46 KG/M2 | TEMPERATURE: 97.7 F | HEIGHT: 69 IN | RESPIRATION RATE: 20 BRPM | WEIGHT: 219.14 LBS | SYSTOLIC BLOOD PRESSURE: 120 MMHG | DIASTOLIC BLOOD PRESSURE: 84 MMHG | HEART RATE: 74 BPM

## 2022-08-29 DIAGNOSIS — K40.90 NON-RECURRENT UNILATERAL INGUINAL HERNIA WITHOUT OBSTRUCTION OR GANGRENE: ICD-10-CM

## 2022-08-29 DIAGNOSIS — N20.1 URETERAL CALCULUS: Primary | ICD-10-CM

## 2022-08-29 DIAGNOSIS — Z00.00 ROUTINE GENERAL MEDICAL EXAMINATION AT A HEALTH CARE FACILITY: ICD-10-CM

## 2022-08-29 DIAGNOSIS — C91.10 CLL (CHRONIC LYMPHOCYTIC LEUKEMIA) (H): ICD-10-CM

## 2022-08-29 DIAGNOSIS — R73.01 ELEVATED FASTING GLUCOSE: Primary | ICD-10-CM

## 2022-08-29 LAB
ALBUMIN SERPL-MCNC: 3.8 G/DL (ref 3.4–5)
ALP SERPL-CCNC: 65 U/L (ref 40–150)
ALT SERPL W P-5'-P-CCNC: 48 U/L (ref 0–70)
ANION GAP SERPL CALCULATED.3IONS-SCNC: 4 MMOL/L (ref 3–14)
AST SERPL W P-5'-P-CCNC: 25 U/L (ref 0–45)
BASOPHILS # BLD MANUAL: 0.2 10E3/UL (ref 0–0.2)
BASOPHILS NFR BLD MANUAL: 1 %
BILIRUB SERPL-MCNC: 0.9 MG/DL (ref 0.2–1.3)
BUN SERPL-MCNC: 12 MG/DL (ref 7–30)
CALCIUM SERPL-MCNC: 8.6 MG/DL (ref 8.5–10.1)
CHLORIDE BLD-SCNC: 105 MMOL/L (ref 94–109)
CHOLEST SERPL-MCNC: 150 MG/DL
CO2 SERPL-SCNC: 27 MMOL/L (ref 20–32)
CREAT SERPL-MCNC: 0.79 MG/DL (ref 0.66–1.25)
EOSINOPHIL # BLD MANUAL: 0.2 10E3/UL (ref 0–0.7)
EOSINOPHIL NFR BLD MANUAL: 1 %
ERYTHROCYTE [DISTWIDTH] IN BLOOD BY AUTOMATED COUNT: 13.8 % (ref 10–15)
FASTING STATUS PATIENT QL REPORTED: YES
FASTING STATUS PATIENT QL REPORTED: YES
GFR SERPL CREATININE-BSD FRML MDRD: >90 ML/MIN/1.73M2
GLUCOSE BLD-MCNC: 161 MG/DL (ref 70–99)
GLUCOSE BLD-MCNC: 161 MG/DL (ref 70–99)
HCT VFR BLD AUTO: 46.1 % (ref 40–53)
HDLC SERPL-MCNC: 36 MG/DL
HGB BLD-MCNC: 15.7 G/DL (ref 13.3–17.7)
LDH SERPL L TO P-CCNC: 178 U/L (ref 85–227)
LDLC SERPL CALC-MCNC: 64 MG/DL
LYMPHOCYTES # BLD MANUAL: 11.2 10E3/UL (ref 0.8–5.3)
LYMPHOCYTES NFR BLD MANUAL: 66 %
MCH RBC QN AUTO: 29.9 PG (ref 26.5–33)
MCHC RBC AUTO-ENTMCNC: 34.1 G/DL (ref 31.5–36.5)
MCV RBC AUTO: 88 FL (ref 78–100)
MONOCYTES # BLD MANUAL: 0.3 10E3/UL (ref 0–1.3)
MONOCYTES NFR BLD MANUAL: 2 %
NEUTROPHILS # BLD MANUAL: 5.1 10E3/UL (ref 1.6–8.3)
NEUTROPHILS NFR BLD MANUAL: 30 %
NONHDLC SERPL-MCNC: 114 MG/DL
PLAT MORPH BLD: ABNORMAL
PLATELET # BLD AUTO: 204 10E3/UL (ref 150–450)
POTASSIUM BLD-SCNC: 3.6 MMOL/L (ref 3.4–5.3)
PROT SERPL-MCNC: 7.1 G/DL (ref 6.8–8.8)
PSA SERPL-MCNC: 1.79 UG/L (ref 0–4)
RBC # BLD AUTO: 5.25 10E6/UL (ref 4.4–5.9)
RBC MORPH BLD: ABNORMAL
SMUDGE CELLS BLD QL SMEAR: PRESENT
SODIUM SERPL-SCNC: 136 MMOL/L (ref 133–144)
TRIGL SERPL-MCNC: 249 MG/DL
VARIANT LYMPHS BLD QL SMEAR: PRESENT
WBC # BLD AUTO: 16.9 10E3/UL (ref 4–11)

## 2022-08-29 PROCEDURE — 99417 PROLNG OP E/M EACH 15 MIN: CPT | Performed by: INTERNAL MEDICINE

## 2022-08-29 PROCEDURE — 85007 BL SMEAR W/DIFF WBC COUNT: CPT

## 2022-08-29 PROCEDURE — 83615 LACTATE (LD) (LDH) ENZYME: CPT

## 2022-08-29 PROCEDURE — 80061 LIPID PANEL: CPT

## 2022-08-29 PROCEDURE — G0103 PSA SCREENING: HCPCS

## 2022-08-29 PROCEDURE — 99215 OFFICE O/P EST HI 40 MIN: CPT | Performed by: INTERNAL MEDICINE

## 2022-08-29 PROCEDURE — 80053 COMPREHEN METABOLIC PANEL: CPT

## 2022-08-29 PROCEDURE — 85027 COMPLETE CBC AUTOMATED: CPT

## 2022-08-29 PROCEDURE — 36415 COLL VENOUS BLD VENIPUNCTURE: CPT

## 2022-08-29 ASSESSMENT — PAIN SCALES - GENERAL: PAINLEVEL: NO PAIN (0)

## 2022-08-29 NOTE — PROGRESS NOTES
Visit Date: 08/29/2022    HISTORY OF PRESENT ILLNESS:  Mr. Becker returns for followup of stage 0 chronic lymphocytic leukemia, history of lung nodule.  We had seen the patient in consultation at the request of Dr. Vanessa Hastings on 09/24/2020.  At that time, Mr. Becker was a 50-year-old white male with history of tobacco abuse who we were asked to evaluate concerning a diagnosis lymphocytosis and pulmonary nodules.  He presented to the Emergency Room with right sided abdominal pain and was found to have ureteral stone with mild hydronephrosis.  The patient was given IV fluids.  At that time, a CBC was drawn, which revealed a white count 17.5, H and H 15.6 and 42.3, platelet count was 241,000.  Peripheral blood smear was ordered and read as mild lymphocytosis, which could be associated with CLL or well-differentiated lymphocytic lymphoma.  The patient also had a CT chest done 12/10/2020 that was read as multiple small noncalcified pulmonary nodules.  These were mainly in the right and left lower lobes and all measured less than 6 mm as well.  The patient did have a history of tobacco abuse in the past and quit 13 years ago.  He was in [place] and a war .  He was in the [employer].  Denies any fevers, night sweats, weight loss, or bone pain.  When we saw the patient, we wanted to rule out CLL.  We obtained peripheral blood for flow cytometry.  This was obtained and revealed the patient had 37% CD5 positive kappa monotypic B cells.  These were all consistent with CLL.  We ordered a PET scan, which was essentially negative.  Lung nodules seen on previous CT were not PET avid.  There was no lymphadenopathy or splenomegaly.  When we saw the patient on 01/06/2021, we recommend a FISH panel to rule out 17p deletion.  The patient was found to be 17p deletion negative; therefore, his prognosis was excellent and the plan was to watch and wait.  He had a repeat CT chest on 07/20/2021, which was read as stable findings.   Otherwise, most recently he had been seen at the [facility] with hematuria.  CT abdomen and pelvis revealed that there was a left nephrolithiasis with small obstructive stone in the ureteropelvic junction that measured 2 x 3 mm.  This was causing obstructive uropathy.  There was fatty liver, cholelithiasis, atherosclerotic heart disease.  There was a small hernia of the fatty content right inguinal region with no incarceration.  The patient apparently was given fluids and was treated with IV normal saline, Toradol, Zofran for his p.r.n. nausea.  He also underwent a urinalysis, which showed red blood cells.  He was given a dose of Flomax.  He was prescribed oxycodone and Reglan.  According to the patient, his symptoms improved.  He was told to follow up with Urology.  Apparently has not made that appointment was not been referred to [facility].  He says he still has some left lower quadrant pain.  He is unaware of any right inguinal pain.  He did have a repeat CT chest on 08/25/2022 and the findings were that there were small pulmonary nodules in both lungs, stable from previous exam in 07/2021.  The patient denies any fevers, night sweats, weight loss, abdominal pain, just minimal left lower quadrant abdominal pain.    PHYSICAL EXAMINATION:    GENERAL:  He is a well-developed, well-nourished white male in no acute distress.  ECOG performance status 0.  VITAL SIGNS:  Reveal blood pressure 130/84, pulse 74, respirations 20, temperature 97.7.  HEENT:  Atraumatic, normocephalic.  Oropharynx nonerythematous.  NECK:  Supple, no thyromegaly.  LUNGS:  Clear to auscultation and percussion.  HEART:  Regular rhythm.  S1, S2 normal.  ABDOMEN:  Soft, normoactive bowel sounds.  No mass, nontender.  There is minimal left lower quadrant tenderness to palpation.  No rebound.  LYMPHATICS:  No cervical, supraclavicular, axillary, or inguinal nodes.:  No edema.  NEUROLOGIC:  Nonfocal.    LABORATORY DATA:  Reveal CBC  with white count of 16.9 with 66% lymphocytes, 30% neutrophils.  Smudge cells are present.  Hemoglobin 15.7, hematocrit 46.1, platelet count is 204.  LDH is 178.  BUN is 12, creatinine 0.79, glucose 161.  PSA is 1.79.    IMPRESSION:    1.  Stage 0 chronic lymphocytic leukemia.  No evidence of lymphadenopathy or organomegaly on imaging studies.  Lymphocytosis remains relatively stable.  No evidence of anemia or thrombocytopenia.  The plan is to continue surveillance.  We will see the patient in 4 months, repeat CBC, CMP, LDH.  2.  Recurrent nephrolithiasis.  The patient has not seen Urology for workup.  He says his hematuria has resolved.  Nonetheless, he still has some left lower quadrant pain.  We would like to refer the patient to Dr. Roe Bal to evaluate for etiology of recurrent nephrolithiasis, whether intervention is required.  3.  Asymptomatic right inguinal hernia.  We will refer to General Surgery for risk assessment.  Otherwise, likely does not need surgery at this time.  Seventy-two minutes were spent on this patient.  Time was spent reviewing multiple physician provider notes, including notes from [facility] visit, discussing labs with the patient, discussing imaging studies with the patient, performing history and physical, documenting history and physical, ordering followup labs and consultations with Dr. Meza and Dr. Roe Bal.    Rm Erickson MD        D: 2022   T: 2022   MT: [id number]    Name:     JIMI KATHLEEN  MRN:      [id number]        Account:    [id number]   :      1970           Visit Date: 2022     Document: [id number]    cc:  MD Roe Johnson MD Joseph R. Skaja, MD

## 2022-08-29 NOTE — NURSING NOTE
"Oncology Rooming Note    August 29, 2022 2:56 PM   Branden Becker is a 51 year old male who presents for:    Chief Complaint   Patient presents with     Oncology Clinic Visit     Follow up CLL (chronic lymphocytic leukemia)      Initial Vitals: /84   Pulse 74   Temp 97.7  F (36.5  C) (Tympanic)   Resp 20   Ht 1.753 m (5' 9\")   Wt 99.4 kg (219 lb 2.2 oz)   SpO2 96%   BMI 32.36 kg/m   Estimated body mass index is 32.36 kg/m  as calculated from the following:    Height as of this encounter: 1.753 m (5' 9\").    Weight as of this encounter: 99.4 kg (219 lb 2.2 oz). Body surface area is 2.2 meters squared.  No Pain (0) Comment: Data Unavailable   No LMP for male patient.  Allergies reviewed: Yes  Medications reviewed: Yes    Medications: Medication refills not needed today.  Pharmacy name entered into [facility]:    [facility] [address] [place], [place] - [address] [address]  [facility] - [address] [facility] - [place], [place] - [address] [facility]          Olimpia De Santiago LPN            "

## 2022-08-29 NOTE — PATIENT INSTRUCTIONS
We will see you back in 4 months with labs prior.    A referral was placed for urology and general surgery.

## 2022-09-06 ENCOUNTER — LAB (OUTPATIENT)
Dept: LAB | Facility: OTHER | Age: 52
End: 2022-09-06
Attending: INTERNAL MEDICINE
Payer: COMMERCIAL

## 2022-09-06 ENCOUNTER — OFFICE VISIT (OUTPATIENT)
Dept: SURGERY | Facility: OTHER | Age: 52
End: 2022-09-06
Attending: INTERNAL MEDICINE
Payer: COMMERCIAL

## 2022-09-06 VITALS
HEIGHT: 69 IN | WEIGHT: 219 LBS | HEART RATE: 67 BPM | BODY MASS INDEX: 32.44 KG/M2 | TEMPERATURE: 97.1 F | OXYGEN SATURATION: 98 %

## 2022-09-06 DIAGNOSIS — R73.01 ELEVATED FASTING GLUCOSE: ICD-10-CM

## 2022-09-06 DIAGNOSIS — K40.90 NON-RECURRENT UNILATERAL INGUINAL HERNIA WITHOUT OBSTRUCTION OR GANGRENE: ICD-10-CM

## 2022-09-06 LAB
EST. AVERAGE GLUCOSE BLD GHB EST-MCNC: 123 MG/DL
HBA1C MFR BLD: 5.9 % (ref 0–5.6)

## 2022-09-06 PROCEDURE — 36415 COLL VENOUS BLD VENIPUNCTURE: CPT

## 2022-09-06 PROCEDURE — 99203 OFFICE O/P NEW LOW 30 MIN: CPT | Performed by: SURGERY

## 2022-09-06 PROCEDURE — 83036 HEMOGLOBIN GLYCOSYLATED A1C: CPT

## 2022-09-06 ASSESSMENT — PAIN SCALES - GENERAL: PAINLEVEL: NO PAIN (0)

## 2022-09-06 NOTE — NURSING NOTE
"Chief Complaint   Patient presents with     Consult     Non-recurrent unilateral inguinal hernia        Initial Pulse 67   Temp 97.1  F (36.2  C)   Ht 1.753 m (5' 9\")   Wt 99.3 kg (219 lb)   SpO2 98%   BMI 32.34 kg/m   Estimated body mass index is 32.34 kg/m  as calculated from the following:    Height as of this encounter: 1.753 m (5' 9\").    Weight as of this encounter: 99.3 kg (219 lb).  Medication Reconciliation: complete  Fiona Arredondo LPN  "

## 2022-09-06 NOTE — PATIENT INSTRUCTIONS
Thank you for allowing Dr. Ackerman and our surgical team to participate in your care. Please call our health unit coordinator at [phone] with scheduling questions or the nurse at [phone] with any other questions or concerns.

## 2022-09-06 NOTE — PROGRESS NOTES
"CLINIC NOTE - CONSULT  9/6/2022    Patient:Branden Becker  Referring Physician: Rm Erickson    Reason for Referral: Left Inguinal Hernia    This is a 51 year old male with a left  inguinal hernia.   Recurrent: NO   Reducible : YES   Symptomatic : NO   Pain : NO   Has had imaging : YES   Obstructive symptoms : NO    Past Medical History:  Past Medical History:   Diagnosis Date     Arthritis      Cancer (H)      GERD (gastroesophageal reflux disease)     2016 - EGD without barretts changes.       Past Surgical History:  History reviewed. No pertinent surgical history.    Family History History:  Family History   Problem Relation Age of Onset     Obesity Mother      Cerebrovascular Disease Mother      Diabetes No family hx of      Coronary Artery Disease No family hx of      Hypertension No family hx of      Hyperlipidemia No family hx of      Breast Cancer No family hx of      Colon Cancer No family hx of      Prostate Cancer No family hx of      Anesthesia Reaction No family hx of      Asthma No family hx of      Genetic Disorder No family hx of      Thyroid Disease No family hx of        History of Tobacco Use:  History   Smoking Status     Former Smoker     Types: Cigarettes     Quit date: 1/1/2006   Smokeless Tobacco     Never Used       Current Medications:  Current Outpatient Medications   Medication Sig Dispense Refill     ibuprofen (ADVIL/MOTRIN) 200 MG tablet Take 200 mg by mouth every 4 hours as needed for mild pain       omeprazole (PRILOSEC) 40 MG DR capsule TAKE 1 CAPSULE DAILY 90 capsule 1       Allergies:  No Known Allergies    ROS:  Pertinent items are noted in HPI.  All other systems are negative.    PHYSICAL EXAM:     Vital signs: Pulse 67   Temp 97.1  F (36.2  C)   Ht 1.753 m (5' 9\")   Wt 99.3 kg (219 lb)   SpO2 98%   BMI 32.34 kg/m     Weight: [unfilled]   BMI: Body mass index is 32.34 kg/m .   General: Normal, healthy, cooperative, in no acute distress, alert   Skin: no " jaundice   HEENT: PERRLA and EOMI      Groin:  Normal male genitalia.  Negative bilateral  inguinal hernia(s).      ASSESSMENT: 51 year old male with no evidence of bilateral lower hernias.     PLAN:  Discussed the options with the patient.  At this point there is nothing to do.  He will follow-up with us as needed.

## 2022-10-04 DIAGNOSIS — K21.9 GASTROESOPHAGEAL REFLUX DISEASE WITHOUT ESOPHAGITIS: ICD-10-CM

## 2022-10-05 RX ORDER — OMEPRAZOLE 40 MG/1
CAPSULE, DELAYED RELEASE ORAL
Qty: 90 CAPSULE | Refills: 3 | Status: SHIPPED | OUTPATIENT
Start: 2022-10-05 | End: 2022-12-21

## 2022-10-05 NOTE — TELEPHONE ENCOUNTER
omeprazole      Last Written Prescription Date:  4/8/22  Last Fill Quantity: 90,   # refills: 1  Last Office Visit: 9/6/22  Future Office visit:    Next 5 appointments (look out 90 days)    Dec 13, 2022  2:00 PM  (Arrive by 1:45 PM)  PHYSICAL with Vanessa Hastings MD  [facility] ([facility] ) [address] [address]  [phone]   Dec 19, 2022  3:00 PM  (Arrive by 2:45 PM)  Return Visit with Rm Erickson MD  [facility] ([facility] ) [address] [address]  [phone]           Routing refill request to provider for review/approval because:

## 2022-10-12 ENCOUNTER — OFFICE VISIT (OUTPATIENT)
Dept: UROLOGY | Facility: OTHER | Age: 52
End: 2022-10-12
Attending: INTERNAL MEDICINE
Payer: COMMERCIAL

## 2022-10-12 VITALS
BODY MASS INDEX: 32.05 KG/M2 | WEIGHT: 217 LBS | RESPIRATION RATE: 16 BRPM | OXYGEN SATURATION: 99 % | HEART RATE: 66 BPM | TEMPERATURE: 96.8 F

## 2022-10-12 DIAGNOSIS — N20.1 URETERAL CALCULUS: ICD-10-CM

## 2022-10-12 PROCEDURE — 99203 OFFICE O/P NEW LOW 30 MIN: CPT | Performed by: UROLOGY

## 2022-10-12 ASSESSMENT — PAIN SCALES - GENERAL: PAINLEVEL: NO PAIN (0)

## 2022-10-12 NOTE — PROGRESS NOTES
Type of Visit  NPV    Chief Complaint  History of kidney stones    HPI  Mr. Becker is a 51 year old male with history of nephrolithiasis interested in stone prevention.  Patient has experienced 2 separate stone events over the past 3 years.  Most recent stone event was last winter just following Thanksgiving.  The patient was able to pass both stones with conservative approach.  He presents wanting to discuss stone prevention.  He has never required surgery in the past  He did drop off his stone for stone analysis.  The patient has no obstructive symptoms or renal colic today or recently.  He denies gross hematuria, dysuria and flank pain.      Past Medical History  He  has a past medical history of Arthritis, Cancer (H), and GERD (gastroesophageal reflux disease).    He has no past medical history of Cerebral infarction (H), Complication of anesthesia, Congestive heart failure (H), COPD (chronic obstructive pulmonary disease) (H), Coronary artery disease, Depressive disorder, Diabetes (H), History of blood transfusion, Hypertension, Thyroid disease, or Uncomplicated asthma.  Patient Active Problem List   Diagnosis     Other tenosynovitis of hand and wrist     Gastroesophageal reflux disease     Ankle pain     [place] cardiac risk <10% in next 10 years     Pulmonary nodules     Abnormal CT scan     Ureteral calculus     CLL (chronic lymphocytic leukemia) (H)       Past Surgical History  He  has no past surgical history on file.    Medications  He has a current medication list which includes the following prescription(s): omeprazole and ibuprofen.    Allergies  No Known Allergies    Social History  He  reports that he quit smoking about 16 years ago. His smoking use included cigarettes. He has never used smokeless tobacco. He reports current alcohol use. He reports that he does not use drugs.  No drug abuse.    Family History  Family History   Problem Relation Age of Onset     Obesity Mother      Cerebrovascular  Disease Mother      Diabetes No family hx of      Coronary Artery Disease No family hx of      Hypertension No family hx of      Hyperlipidemia No family hx of      Breast Cancer No family hx of      Colon Cancer No family hx of      Prostate Cancer No family hx of      Anesthesia Reaction No family hx of      Asthma No family hx of      Genetic Disorder No family hx of      Thyroid Disease No family hx of        Review of Systems  I personally reviewed the ROS with the patient.    Nursing Notes:   Lakia Powell LPN  10/12/2022 11:18 AM  Addendum  Pt presents to clinic for kidney stone consult  Review of Systems:    Weight loss:    No     Recent fever/chills:  No   Night sweats:   No  Current skin rash:  No   Recent hair loss:  No  Heat intolerance:  No   Cold intolerance:  No  Chest pain:   No   Palpitations:   No  Shortness of breath:  No   Wheezing:   No  Constipation:    No   Diarrhea:   No   Nausea:   No   Vomiting:   No   Kidney/side pain:  yES   Back pain:   No  Frequent headaches:  No   Dizziness:     No  Leg swelling:   No   Calf pain:    No    Parents, brothers or sisters with history of kidney cancer:   No  Parents, brothers or sisters with history of bladder cancer: No  Father or brother with history of prostate cancer:  No        Physical Exam  Vitals:    10/12/22 1118   Pulse: 66   Resp: 16   Temp: 96.8  F (36  C)   TempSrc: Tympanic   SpO2: 99%   Weight: 98.4 kg (217 lb)     Constitutional: NAD, WDWN.   Head: NCAT  Eyes: Conjunctivae normal  Pulmonary/Chest: Respirations are even and non-labored bilaterally.  Abdominal: Soft. No distension. No CVA tenderness.  Extremities: FILEMON x 4, Warm. No clubbing.  No cyanosis.    Skin: Pink, warm and dry.  No rashes noted.  Genitourinary:  Nonpalpable bladder    Labs  No results found for any visits on 10/12/22.    Stone Analysis  70% calcium oxalate monohydrate, and   30% calcium phosphate     Imaging  PET CT c/a/p  1/20/2021  I personally reviewed and  interpreted the images and report.  IMPRESSION:   No evidence of abnormal FDG uptake.     Stable appearance of small nodules within the lungs.   In a patient greater than 35 years of age with no history of cancer,  the Fleischner Society 2017 guidelines for a single nodule or numerous  nodules smaller than 6 mm in mean diameter states the following:     Low risk patients*: No additional follow-up.     High risk patients**: Optional CT in 12 months.    Assessment & Plan  Mr. Becker is a 51 year old male who presents with history of 2 recent stone events.  Reviewed the past notes labs and imaging to conduct the visit.  The patient is forming mixed calcium stones which is the most common type.  We discussed a generic approach versus patient specific approach for stone prevention.  Given his stone history I recommended the generic approach.  I explained that these patient specific approach typically leads to lifelong medication.  He is in agreement with the generic approach so we discussed this in detail.  Recommended a urine output goal of at least 2 L per 24 hours.  Provided a urinal and explained how to collect his urine and measured at home for feedback on hydration status.  Explained that this is the most effective method for stone prevention.  However, additionally he can focus on a low-salt diet, increased citrate supplementation through lemon and orange and maintaining a moderate protein intake diet.  Important to avoid Atkins diets as well as high-protein supplemented foods such as protein bars and protein powders.  Reviewed his most recent CT imaging of his kidneys performed last year -no stones at that time.

## 2022-10-12 NOTE — NURSING NOTE
Pt presents to clinic for kidney stone consult  Review of Systems:    Weight loss:    No     Recent fever/chills:  No   Night sweats:   No  Current skin rash:  No   Recent hair loss:  No  Heat intolerance:  No   Cold intolerance:  No  Chest pain:   No   Palpitations:   No  Shortness of breath:  No   Wheezing:   No  Constipation:    No   Diarrhea:   No   Nausea:   No   Vomiting:   No   Kidney/side pain:  yES   Back pain:   No  Frequent headaches:  No   Dizziness:     No  Leg swelling:   No   Calf pain:    No    Parents, brothers or sisters with history of kidney cancer:   No  Parents, brothers or sisters with history of bladder cancer: No  Father or brother with history of prostate cancer:  No

## 2022-12-19 ENCOUNTER — ONCOLOGY VISIT (OUTPATIENT)
Dept: ONCOLOGY | Facility: OTHER | Age: 52
End: 2022-12-19
Attending: INTERNAL MEDICINE
Payer: COMMERCIAL

## 2022-12-19 ENCOUNTER — LAB (OUTPATIENT)
Dept: LAB | Facility: OTHER | Age: 52
End: 2022-12-19
Attending: INTERNAL MEDICINE
Payer: COMMERCIAL

## 2022-12-19 VITALS
WEIGHT: 212.52 LBS | BODY MASS INDEX: 31.48 KG/M2 | HEART RATE: 71 BPM | RESPIRATION RATE: 18 BRPM | TEMPERATURE: 98.3 F | DIASTOLIC BLOOD PRESSURE: 72 MMHG | OXYGEN SATURATION: 98 % | HEIGHT: 69 IN | SYSTOLIC BLOOD PRESSURE: 126 MMHG

## 2022-12-19 DIAGNOSIS — C91.10 CLL (CHRONIC LYMPHOCYTIC LEUKEMIA) (H): Primary | ICD-10-CM

## 2022-12-19 DIAGNOSIS — C91.10 CLL (CHRONIC LYMPHOCYTIC LEUKEMIA) (H): ICD-10-CM

## 2022-12-19 LAB
ALBUMIN SERPL BCG-MCNC: 4.3 G/DL (ref 3.5–5.2)
ALP SERPL-CCNC: 68 U/L (ref 40–129)
ALT SERPL W P-5'-P-CCNC: 31 U/L (ref 10–50)
ANION GAP SERPL CALCULATED.3IONS-SCNC: 12 MMOL/L (ref 7–15)
AST SERPL W P-5'-P-CCNC: 23 U/L (ref 10–50)
BASOPHILS # BLD MANUAL: 0 10E3/UL (ref 0–0.2)
BASOPHILS NFR BLD MANUAL: 0 %
BILIRUB SERPL-MCNC: 0.5 MG/DL
BUN SERPL-MCNC: 17.7 MG/DL (ref 6–20)
CALCIUM SERPL-MCNC: 8.9 MG/DL (ref 8.6–10)
CHLORIDE SERPL-SCNC: 105 MMOL/L (ref 98–107)
CREAT SERPL-MCNC: 0.77 MG/DL (ref 0.67–1.17)
DEPRECATED HCO3 PLAS-SCNC: 24 MMOL/L (ref 22–29)
EOSINOPHIL # BLD MANUAL: 0 10E3/UL (ref 0–0.7)
EOSINOPHIL NFR BLD MANUAL: 0 %
ERYTHROCYTE [DISTWIDTH] IN BLOOD BY AUTOMATED COUNT: 13.2 % (ref 10–15)
GFR SERPL CREATININE-BSD FRML MDRD: >90 ML/MIN/1.73M2
GLUCOSE SERPL-MCNC: 113 MG/DL (ref 70–99)
HCT VFR BLD AUTO: 45.6 % (ref 40–53)
HGB BLD-MCNC: 15.4 G/DL (ref 13.3–17.7)
LDH SERPL L TO P-CCNC: 177 U/L (ref 0–250)
LYMPHOCYTES # BLD MANUAL: 12.3 10E3/UL (ref 0.8–5.3)
LYMPHOCYTES NFR BLD MANUAL: 76 %
MCH RBC QN AUTO: 30.6 PG (ref 26.5–33)
MCHC RBC AUTO-ENTMCNC: 33.8 G/DL (ref 31.5–36.5)
MCV RBC AUTO: 91 FL (ref 78–100)
MONOCYTES # BLD MANUAL: 0.3 10E3/UL (ref 0–1.3)
MONOCYTES NFR BLD MANUAL: 2 %
NEUTROPHILS # BLD MANUAL: 3.6 10E3/UL (ref 1.6–8.3)
NEUTROPHILS NFR BLD MANUAL: 22 %
PLAT MORPH BLD: ABNORMAL
PLATELET # BLD AUTO: 231 10E3/UL (ref 150–450)
POTASSIUM SERPL-SCNC: 4.2 MMOL/L (ref 3.4–5.3)
PROT SERPL-MCNC: 7.2 G/DL (ref 6.4–8.3)
RBC # BLD AUTO: 5.03 10E6/UL (ref 4.4–5.9)
RBC MORPH BLD: ABNORMAL
SMUDGE CELLS BLD QL SMEAR: PRESENT
SODIUM SERPL-SCNC: 141 MMOL/L (ref 136–145)
VARIANT LYMPHS BLD QL SMEAR: PRESENT
WBC # BLD AUTO: 16.2 10E3/UL (ref 4–11)

## 2022-12-19 PROCEDURE — 85027 COMPLETE CBC AUTOMATED: CPT

## 2022-12-19 PROCEDURE — 83615 LACTATE (LD) (LDH) ENZYME: CPT

## 2022-12-19 PROCEDURE — 80053 COMPREHEN METABOLIC PANEL: CPT

## 2022-12-19 PROCEDURE — 99214 OFFICE O/P EST MOD 30 MIN: CPT | Performed by: INTERNAL MEDICINE

## 2022-12-19 PROCEDURE — 85007 BL SMEAR W/DIFF WBC COUNT: CPT

## 2022-12-19 PROCEDURE — 36415 COLL VENOUS BLD VENIPUNCTURE: CPT

## 2022-12-19 ASSESSMENT — PAIN SCALES - GENERAL: PAINLEVEL: NO PAIN (0)

## 2022-12-19 NOTE — NURSING NOTE
"Oncology Rooming Note    December 19, 2022 2:13 PM   Branden Becker is a 52 year old male who presents for:    Chief Complaint   Patient presents with     Oncology Clinic Visit     Follow up. CLL (chronic lymphocytic leukemia)     Initial Vitals: /72   Pulse 71   Temp 98.3  F (36.8  C) (Tympanic)   Resp 18   Ht 1.753 m (5' 9\")   Wt 96.4 kg (212 lb 8.4 oz)   SpO2 98%   BMI 31.38 kg/m   Estimated body mass index is 31.38 kg/m  as calculated from the following:    Height as of this encounter: 1.753 m (5' 9\").    Weight as of this encounter: 96.4 kg (212 lb 8.4 oz). Body surface area is 2.17 meters squared.  No Pain (0) Comment: Data Unavailable   No LMP for male patient.  Allergies reviewed: Yes  Medications reviewed: Yes    Medications: Medication refills not needed today.  Pharmacy name entered into [facility]:    [facility] [address] [place], [place] - [address] [address]  [facility] [address] [place] [address] PHARMACY 4891 - [place], [place] - [address] [facility]          Adeline Rivera LPN              "

## 2022-12-19 NOTE — PROGRESS NOTES
Visit Date: 12/19/2022    HISTORY OF PRESENT ILLNESS:  Mr. Becker returns for followup of stage 0 chronic lymphocytic leukemia with history of lung nodule.  We had seen the patient in consultation at the request of Dr. Vanessa Hastings on 09/24/2020.  At that time, Mr. Becker was a 50-year-old white male with history of tobacco abuse, who we were asked to evaluate concerning diagnosis lymphocytosis and pulmonary nodules.  He presented to the Emergency Room with right sided abdominal pain, was found to have ureteral stone with mild hydronephrosis.  The patient was given IV fluids. At that time, a CBC was drawn, which revealed a white count of 17.5, H and H 15.6 and 42.3, platelet count was 241,000.  Peripheral blood smear was ordered and was read as mild lymphocytosis, which could be associated with CLL with a well-differentiated lymphocytic lymphoma.  The patient also had a CT chest done 12/10/2020 which was read as multiple small noncalcified pulmonary nodules.  It was mainly in the right and left lower lobes and all measured less than 6 mm. As well as the patient did have a history of tobacco abuse in the past and quit 13 years ago, he was in [place] and was a war vet there and was in the [employer].  Denied any fevers, night sweats, weight loss or bone pain.  When we saw the patient, we wanted to rule out CLL.  We obtained peripheral blood for flow cytometry.  This was obtained and revealed the patient had 37% CD5 positive kappa monotypic B cells.  These were all consistent with CLL.  We ordered a PET scan, which was essentially negative.  The lung nodules seen on previous CT were not PET avid.  There was no lymphadenopathy or splenomegaly.  When we saw the patient on 01/06/2021, recommend a FISH panel to rule out 17p deletion.  The patient was found to be 17p deletion, negative; therefore, the prognosis was excellent and the plan was to watch and wait.  He had a repeat CT chest on 07/20/2021 which was read as stable  findings.  Otherwise, most recently he had been seen at the [facility] with hematuria.  CT abdomen and pelvis revealed there was a left nephrolithiasis, nonobstructive stone in the ureteropelvic junction that measured 2 x 3 mm.  This was causing obstructive uropathy.  There was fatty liver, cholelithiasis, atherosclerotic heart disease.  There was a small hernia with fatty content in the right inguinal region with no incarceration.  The patient apparently was given fluids and was treated with IV normal saline, Toradol, Zofran p.r.n. nausea and also underwent a urinalysis, which showed red blood cells.  He was given a dose of Flomax and prescribed oxycodone and Reglan.  According to the patient, symptoms improved.  He was told to follow up with Urology.  Apparently, he had not been referred to Urology when we saw him last in September.  He did have repeat CT chest on 08/25/2021 and the findings were there were small pulmonary nodules in both lungs, stable from previous exam in 07/2021.  We recommended he see Urology for his recurrent nephrolithiasis.  He saw Dr. Bal who recommended a conservative approach. In terms of prevention, he recommended a generic approach.  He recommended that the patient increased his urine output of at least 2 liters 24 hours.  He also recommended a low salt diet, increase citrate supplementation through lemon and orange and maintain a moderate intake diet.  He recommended to avoid the Atkins diet as well as high protein supplements, including protein bars and protein powders.  He has not had any recurrent nephrolithiasis since then.  He is doing relatively well.  Denies any fevers, night sweats, weight loss.  Continues to work on a regular basis.  No early satiety.  Overall, he is doing well.    PHYSICAL EXAMINATION:    GENERAL:  He is a well-developed, well-nourished, middle-aged white male in no acute distress.  ECOG performance status is 0.  VITAL SIGNS:  Reveal blood  pressure 126/72, pulse 71, respirations 18, temperature 98.3.  HEENT:  Atraumatic, normocephalic.  Oropharynx nonerythematous.  NECK:  Supple, no thyromegaly.  LUNGS:  Clear to auscultation and percussion.  HEART:  Regular rhythm.  S1, S2 normal.  ABDOMEN:  Soft.  There is no splenomegaly, nontender.  Normoactive bowel sounds.  LYMPHATICS:  No cervical, supraclavicular, axillary or inguinal nodes.    EXTREMITIES:  No edema.  NEUROLOGIC:  Nonfocal.    LABORATORY DATA:  Reveal CBC with white count 16.2 with 12.3 lymphocytes, H and H is 15.4 and 45.6, platelet count is 231.  LDH is 177.  LFTs are normal.  BUN 17.7, creatinine 0.77.    IMPRESSION:    1.  Stage 0 chronic lymphocytic leukemia.  Lymphocyte count remains stable.  There is no evidence of anemia, thrombocytopenia, splenomegaly, or lymphadenopathy.  The patient is asymptomatic.  Plan is to continue surveillance.  We will see the patient in 6 months and repeat CBC, CMP, LDH.  2.  Recurrent nephrolithiasis. As per Dr. Bal, the patient is currently on a generic diet. No recurrence of nephrolithiasis.  Sixty-eight minutes was spent with the patient.  Time was spent reviewing previous provider notes, lab results, imaging results, performing history and physical, documenting history and physical, and ordering followup labs.    Rm Erickson MD        D: 2022   T: 2022   MT: [id number]    Name:     JIMI KATHLEEN  MRN:      [id number]        Account:    [id number]   :      1970           Visit Date: 2022     Document: [id number]    cc:  MD Vanessa London MD

## 2022-12-21 ENCOUNTER — OFFICE VISIT (OUTPATIENT)
Dept: FAMILY MEDICINE | Facility: OTHER | Age: 52
End: 2022-12-21
Attending: FAMILY MEDICINE
Payer: COMMERCIAL

## 2022-12-21 VITALS
WEIGHT: 210 LBS | HEIGHT: 69 IN | TEMPERATURE: 97.4 F | HEART RATE: 67 BPM | SYSTOLIC BLOOD PRESSURE: 118 MMHG | DIASTOLIC BLOOD PRESSURE: 76 MMHG | OXYGEN SATURATION: 98 % | RESPIRATION RATE: 16 BRPM | BODY MASS INDEX: 31.1 KG/M2

## 2022-12-21 DIAGNOSIS — K21.9 GASTROESOPHAGEAL REFLUX DISEASE WITHOUT ESOPHAGITIS: ICD-10-CM

## 2022-12-21 DIAGNOSIS — Z00.00 ROUTINE GENERAL MEDICAL EXAMINATION AT A HEALTH CARE FACILITY: Primary | ICD-10-CM

## 2022-12-21 DIAGNOSIS — R73.03 PREDIABETES: ICD-10-CM

## 2022-12-21 DIAGNOSIS — C91.10 CLL (CHRONIC LYMPHOCYTIC LEUKEMIA) (H): ICD-10-CM

## 2022-12-21 DIAGNOSIS — Z11.59 SCREENING FOR VIRAL DISEASE: ICD-10-CM

## 2022-12-21 LAB
ALBUMIN SERPL BCG-MCNC: 4.3 G/DL (ref 3.5–5.2)
ALP SERPL-CCNC: 68 U/L (ref 40–129)
ALT SERPL W P-5'-P-CCNC: 29 U/L (ref 10–50)
ANION GAP SERPL CALCULATED.3IONS-SCNC: 12 MMOL/L (ref 7–15)
AST SERPL W P-5'-P-CCNC: 22 U/L (ref 10–50)
BASOPHILS # BLD MANUAL: 0 10E3/UL (ref 0–0.2)
BASOPHILS NFR BLD MANUAL: 0 %
BILIRUB SERPL-MCNC: 0.6 MG/DL
BUN SERPL-MCNC: 11.3 MG/DL (ref 6–20)
CALCIUM SERPL-MCNC: 9 MG/DL (ref 8.6–10)
CHLORIDE SERPL-SCNC: 103 MMOL/L (ref 98–107)
CHOLEST SERPL-MCNC: 188 MG/DL
CREAT SERPL-MCNC: 0.78 MG/DL (ref 0.67–1.17)
DEPRECATED HCO3 PLAS-SCNC: 25 MMOL/L (ref 22–29)
EOSINOPHIL # BLD MANUAL: 0.2 10E3/UL (ref 0–0.7)
EOSINOPHIL NFR BLD MANUAL: 1 %
ERYTHROCYTE [DISTWIDTH] IN BLOOD BY AUTOMATED COUNT: 13.2 % (ref 10–15)
EST. AVERAGE GLUCOSE BLD GHB EST-MCNC: 120 MG/DL
GFR SERPL CREATININE-BSD FRML MDRD: >90 ML/MIN/1.73M2
GLUCOSE SERPL-MCNC: 75 MG/DL (ref 70–99)
HBA1C MFR BLD: 5.8 %
HCT VFR BLD AUTO: 44.9 % (ref 40–53)
HDLC SERPL-MCNC: 41 MG/DL
HGB BLD-MCNC: 15.2 G/DL (ref 13.3–17.7)
LDH SERPL L TO P-CCNC: 184 U/L (ref 0–250)
LDLC SERPL CALC-MCNC: 120 MG/DL
LYMPHOCYTES # BLD MANUAL: 13.5 10E3/UL (ref 0.8–5.3)
LYMPHOCYTES NFR BLD MANUAL: 78 %
MCH RBC QN AUTO: 30.5 PG (ref 26.5–33)
MCHC RBC AUTO-ENTMCNC: 33.9 G/DL (ref 31.5–36.5)
MCV RBC AUTO: 90 FL (ref 78–100)
MONOCYTES # BLD MANUAL: 0 10E3/UL (ref 0–1.3)
MONOCYTES NFR BLD MANUAL: 0 %
NEUTROPHILS # BLD MANUAL: 3.6 10E3/UL (ref 1.6–8.3)
NEUTROPHILS NFR BLD MANUAL: 21 %
NONHDLC SERPL-MCNC: 147 MG/DL
PLAT MORPH BLD: ABNORMAL
PLATELET # BLD AUTO: 261 10E3/UL (ref 150–450)
POTASSIUM SERPL-SCNC: 4 MMOL/L (ref 3.4–5.3)
PROT SERPL-MCNC: 7.1 G/DL (ref 6.4–8.3)
RBC # BLD AUTO: 4.98 10E6/UL (ref 4.4–5.9)
RBC MORPH BLD: ABNORMAL
SODIUM SERPL-SCNC: 140 MMOL/L (ref 136–145)
TRIGL SERPL-MCNC: 134 MG/DL
WBC # BLD AUTO: 17.3 10E3/UL (ref 4–11)

## 2022-12-21 PROCEDURE — 91312 COVID-19 VACCINE BIVALENT BOOSTER 12+ (PFIZER): CPT | Performed by: FAMILY MEDICINE

## 2022-12-21 PROCEDURE — 87389 HIV-1 AG W/HIV-1&-2 AB AG IA: CPT | Performed by: FAMILY MEDICINE

## 2022-12-21 PROCEDURE — 99396 PREV VISIT EST AGE 40-64: CPT | Mod: 25 | Performed by: FAMILY MEDICINE

## 2022-12-21 PROCEDURE — 86803 HEPATITIS C AB TEST: CPT | Performed by: FAMILY MEDICINE

## 2022-12-21 PROCEDURE — 36415 COLL VENOUS BLD VENIPUNCTURE: CPT | Performed by: FAMILY MEDICINE

## 2022-12-21 PROCEDURE — 80053 COMPREHEN METABOLIC PANEL: CPT | Performed by: FAMILY MEDICINE

## 2022-12-21 PROCEDURE — 0124A COVID-19 VACCINE BIVALENT BOOSTER 12+ (PFIZER): CPT | Performed by: FAMILY MEDICINE

## 2022-12-21 PROCEDURE — 83615 LACTATE (LD) (LDH) ENZYME: CPT | Performed by: FAMILY MEDICINE

## 2022-12-21 PROCEDURE — 80061 LIPID PANEL: CPT | Performed by: FAMILY MEDICINE

## 2022-12-21 PROCEDURE — 85007 BL SMEAR W/DIFF WBC COUNT: CPT | Performed by: FAMILY MEDICINE

## 2022-12-21 PROCEDURE — 85027 COMPLETE CBC AUTOMATED: CPT | Performed by: FAMILY MEDICINE

## 2022-12-21 PROCEDURE — 83036 HEMOGLOBIN GLYCOSYLATED A1C: CPT | Performed by: FAMILY MEDICINE

## 2022-12-21 ASSESSMENT — ENCOUNTER SYMPTOMS
DYSURIA: 0
HEMATURIA: 0
PALPITATIONS: 0
DIARRHEA: 0
ABDOMINAL PAIN: 0
HEMATOCHEZIA: 0
CONSTIPATION: 0
WEAKNESS: 0
SORE THROAT: 0
NERVOUS/ANXIOUS: 0
DIZZINESS: 0
MYALGIAS: 0
HEADACHES: 0
ARTHRALGIAS: 0
HEARTBURN: 1
PARESTHESIAS: 0
JOINT SWELLING: 0
FEVER: 0
COUGH: 0
NAUSEA: 0
SHORTNESS OF BREATH: 0
CHILLS: 0
EYE PAIN: 0
FREQUENCY: 0

## 2022-12-21 ASSESSMENT — PAIN SCALES - GENERAL: PAINLEVEL: NO PAIN (0)

## 2022-12-21 NOTE — PROGRESS NOTES
SUBJECTIVE:   CC: Branden is an 52 year old who presents for preventative health visit.     Patient has been advised of split billing requirements and indicates understanding: Yes     Healthy Habits:     Getting at least 3 servings of Calcium per day:  NO    Bi-annual eye exam:  Yes    Dental care twice a year:  Yes    Sleep apnea or symptoms of sleep apnea:  None    Diet:  Regular (no restrictions)    Frequency of exercise:  2-3 days/week    Duration of exercise:  30-45 minutes    Taking medications regularly:  Yes    Medication side effects:  None    PHQ-2 Total Score: 0    Additional concerns today:  No    Today's PHQ-2 Score:   PHQ-2 (  [employer]) 2022   Q1: Little interest or pleasure in doing things 0   Q2: Feeling down, depressed or hopeless 0   PHQ-2 Score 0   PHQ-2 Total Score (12-17 Years)- Positive if 3 or more points; Administer PHQ-A if positive -   Q1: Little interest or pleasure in doing things Not at all   Q2: Feeling down, depressed or hopeless Not at all   PHQ-2 Score 0       Social History     Tobacco Use     Smoking status: Former     Types: Cigarettes     Quit date: 2006     Years since quittin.9     Smokeless tobacco: Never   Substance Use Topics     Alcohol use: Yes     Comment: a case a year at the most.      If you drink alcohol do you typically have >3 drinks per day or >7 drinks per week? No    Alcohol Use 2022   Prescreen: >3 drinks/day or >7 drinks/week? No   Prescreen: >3 drinks/day or >7 drinks/week? -   No flowsheet data found.    Last PSA:   Prostate Specific Antigen Screen   Date Value Ref Range Status   2022 1.79 0.00 - 4.00 ug/L Final       Reviewed orders with patient. Reviewed health maintenance and updated orders accordingly - Yes    Reviewed and updated as needed this visit by clinical staff   Tobacco  Allergies  Meds  Problems  Med Hx  Surg Hx  Fam Hx          Reviewed and updated as needed this visit by Provider   Tobacco  Allergies  Meds   "Problems  Med Hx  Surg Hx  Fam Hx           Review of Systems   Constitutional: Negative for chills and fever.   HENT: Negative for congestion, ear pain, hearing loss and sore throat.    Eyes: Negative for pain and visual disturbance.   Respiratory: Negative for cough and shortness of breath.    Cardiovascular: Negative for chest pain, palpitations and peripheral edema.   Gastrointestinal: Positive for heartburn. Negative for abdominal pain, constipation, diarrhea, hematochezia and nausea.   Genitourinary: Negative for dysuria, frequency, genital sores, hematuria, impotence, penile discharge and urgency.   Musculoskeletal: Negative for arthralgias, joint swelling and myalgias.   Skin: Negative for rash.   Neurological: Negative for dizziness, weakness, headaches and paresthesias.   Psychiatric/Behavioral: Negative for mood changes. The patient is not nervous/anxious.      OBJECTIVE:   /76 (BP Location: Left arm, Patient Position: Sitting, Cuff Size: Adult Large)   Pulse 67   Temp 97.4  F (36.3  C) (Tympanic)   Resp 16   Ht 1.753 m (5' 9\")   Wt 95.3 kg (210 lb)   SpO2 98%   BMI 31.01 kg/m      Physical Exam  GENERAL: healthy, alert and no distress  EYES: Eyes grossly normal to inspection, PERRL and conjunctivae and sclerae normal  HENT: ear canals and TM's normal, nose and mouth without ulcers or lesions  NECK: no adenopathy, no asymmetry, masses, or scars and thyroid normal to palpation  RESP: lungs clear to auscultation - no rales, rhonchi or wheezes  CV: regular rate and rhythm, normal S1 S2, no S3 or S4, no murmur, click or rub, no peripheral edema and peripheral pulses strong  ABDOMEN: soft, nontender, no hepatosplenomegaly, no masses and bowel sounds normal  MS: no gross musculoskeletal defects noted, no edema  SKIN: no suspicious lesions or rashes  NEURO: Normal strength and tone, mentation intact and speech normal  PSYCH: mentation appears normal, affect normal/bright    Diagnostic Test " "Results:  Labs reviewed in Epic  No results found for any visits on 12/21/22.    ASSESSMENT/PLAN:   Branden was seen today for physical.    Diagnoses and all orders for this visit:    Routine general medical examination at a health care facility  -     Lipid Profile (Chol, Trig, HDL, LDL calc); Future    Gastroesophageal reflux disease without esophagitis  Controlled, would like to trial lower dose.   -     omeprazole (PRILOSEC) 20 MG DR capsule; Take 1 capsule (20 mg) by mouth daily    CLL (chronic lymphocytic leukemia) (H)  Stable, oncology notes reviewed.     Prediabetes  -     Hemoglobin A1c; Future    Screening for viral disease  -     HIV Antigen Antibody Combo; Future  -     Hepatitis C Screen Reflex to HCV RNA Quant and Genotype; Future    Other orders  -     COVID-19 VACCINE BIVALENT BOOSTER 12+ (PFIZER)    Patient has been advised of split billing requirements and indicates understanding: Yes    COUNSELING:   Reviewed preventive health counseling, as reflected in patient instructions    BMI:   Estimated body mass index is 31.01 kg/m  as calculated from the following:    Height as of this encounter: 1.753 m (5' 9\").    Weight as of this encounter: 95.3 kg (210 lb).   Weight management plan: Discussed healthy diet and exercise guidelines    He reports that he quit smoking about 16 years ago. His smoking use included cigarettes. He has never used smokeless tobacco.      Vanessa Hastings MD  [facility] - [place]  "

## 2022-12-23 LAB
HCV AB SERPL QL IA: NONREACTIVE
HIV 1+2 AB+HIV1 P24 AG SERPL QL IA: NONREACTIVE

## 2023-05-16 ENCOUNTER — TELEPHONE (OUTPATIENT)
Dept: ONCOLOGY | Facility: OTHER | Age: 53
End: 2023-05-16

## 2023-06-26 DIAGNOSIS — C91.10 CLL (CHRONIC LYMPHOCYTIC LEUKEMIA) (H): Primary | ICD-10-CM

## 2023-07-10 ENCOUNTER — ONCOLOGY VISIT (OUTPATIENT)
Dept: ONCOLOGY | Facility: OTHER | Age: 53
End: 2023-07-10
Attending: INTERNAL MEDICINE
Payer: COMMERCIAL

## 2023-07-10 ENCOUNTER — LAB (OUTPATIENT)
Dept: LAB | Facility: OTHER | Age: 53
End: 2023-07-10
Attending: INTERNAL MEDICINE
Payer: COMMERCIAL

## 2023-07-10 VITALS
SYSTOLIC BLOOD PRESSURE: 124 MMHG | TEMPERATURE: 97.1 F | HEART RATE: 74 BPM | WEIGHT: 217.59 LBS | BODY MASS INDEX: 32.23 KG/M2 | OXYGEN SATURATION: 97 % | DIASTOLIC BLOOD PRESSURE: 62 MMHG | HEIGHT: 69 IN

## 2023-07-10 DIAGNOSIS — C91.10 CLL (CHRONIC LYMPHOCYTIC LEUKEMIA) (H): ICD-10-CM

## 2023-07-10 DIAGNOSIS — C91.10 CLL (CHRONIC LYMPHOCYTIC LEUKEMIA) (H): Primary | ICD-10-CM

## 2023-07-10 LAB
ALBUMIN SERPL BCG-MCNC: 4.4 G/DL (ref 3.5–5.2)
ALP SERPL-CCNC: 61 U/L (ref 40–129)
ALT SERPL W P-5'-P-CCNC: 28 U/L (ref 0–70)
ANION GAP SERPL CALCULATED.3IONS-SCNC: 14 MMOL/L (ref 7–15)
AST SERPL W P-5'-P-CCNC: 23 U/L (ref 0–45)
BASOPHILS # BLD AUTO: 0 10E3/UL (ref 0–0.2)
BASOPHILS NFR BLD AUTO: 0 %
BILIRUB SERPL-MCNC: 0.8 MG/DL
BUN SERPL-MCNC: 16.9 MG/DL (ref 6–20)
CALCIUM SERPL-MCNC: 9.3 MG/DL (ref 8.6–10)
CHLORIDE SERPL-SCNC: 103 MMOL/L (ref 98–107)
CREAT SERPL-MCNC: 0.81 MG/DL (ref 0.67–1.17)
DEPRECATED HCO3 PLAS-SCNC: 23 MMOL/L (ref 22–29)
EOSINOPHIL # BLD AUTO: 0.2 10E3/UL (ref 0–0.7)
EOSINOPHIL NFR BLD AUTO: 1 %
ERYTHROCYTE [DISTWIDTH] IN BLOOD BY AUTOMATED COUNT: 13.4 % (ref 10–15)
GFR SERPL CREATININE-BSD FRML MDRD: >90 ML/MIN/1.73M2
GLUCOSE SERPL-MCNC: 110 MG/DL (ref 70–99)
HCT VFR BLD AUTO: 45.5 % (ref 40–53)
HGB BLD-MCNC: 15.8 G/DL (ref 13.3–17.7)
IMM GRANULOCYTES # BLD: 0.1 10E3/UL
IMM GRANULOCYTES NFR BLD: 0 %
LDH SERPL L TO P-CCNC: 212 U/L (ref 0–250)
LYMPHOCYTES # BLD AUTO: 12.7 10E3/UL (ref 0.8–5.3)
LYMPHOCYTES NFR BLD AUTO: 74 %
MCH RBC QN AUTO: 30.6 PG (ref 26.5–33)
MCHC RBC AUTO-ENTMCNC: 34.7 G/DL (ref 31.5–36.5)
MCV RBC AUTO: 88 FL (ref 78–100)
MONOCYTES # BLD AUTO: 0.6 10E3/UL (ref 0–1.3)
MONOCYTES NFR BLD AUTO: 3 %
NEUTROPHILS # BLD AUTO: 3.8 10E3/UL (ref 1.6–8.3)
NEUTROPHILS NFR BLD AUTO: 22 %
NRBC # BLD AUTO: 0 10E3/UL
NRBC BLD AUTO-RTO: 0 /100
PLAT MORPH BLD: NORMAL
PLATELET # BLD AUTO: 179 10E3/UL (ref 150–450)
POTASSIUM SERPL-SCNC: 3.7 MMOL/L (ref 3.4–5.3)
PROT SERPL-MCNC: 7 G/DL (ref 6.4–8.3)
RBC # BLD AUTO: 5.17 10E6/UL (ref 4.4–5.9)
RBC MORPH BLD: NORMAL
SODIUM SERPL-SCNC: 140 MMOL/L (ref 136–145)
WBC # BLD AUTO: 17.4 10E3/UL (ref 4–11)

## 2023-07-10 PROCEDURE — 80053 COMPREHEN METABOLIC PANEL: CPT

## 2023-07-10 PROCEDURE — 99215 OFFICE O/P EST HI 40 MIN: CPT | Performed by: INTERNAL MEDICINE

## 2023-07-10 PROCEDURE — 85025 COMPLETE CBC W/AUTO DIFF WBC: CPT

## 2023-07-10 PROCEDURE — 83615 LACTATE (LD) (LDH) ENZYME: CPT

## 2023-07-10 PROCEDURE — 36415 COLL VENOUS BLD VENIPUNCTURE: CPT

## 2023-07-10 ASSESSMENT — PAIN SCALES - GENERAL: PAINLEVEL: NO PAIN (0)

## 2023-07-10 NOTE — NURSING NOTE
"Oncology Rooming Note    July 10, 2023 3:29 PM   Branden Becker is a 52 year old male who presents for:    Chief Complaint   Patient presents with     Oncology Clinic Visit     Follow up. CLL (chronic lymphocytic leukemia)      Initial Vitals: /62   Pulse 74   Temp 97.1  F (36.2  C) (Tympanic)   Ht 1.753 m (5' 9\")   Wt 98.7 kg (217 lb 9.5 oz)   SpO2 97%   BMI 32.13 kg/m   Estimated body mass index is 32.13 kg/m  as calculated from the following:    Height as of this encounter: 1.753 m (5' 9\").    Weight as of this encounter: 98.7 kg (217 lb 9.5 oz). Body surface area is 2.19 meters squared.  No Pain (0) Comment: Data Unavailable   No LMP for male patient.  Allergies reviewed: Yes  Medications reviewed: Yes    Medications: Medication refills not needed today.  Pharmacy name entered into [facility]:    [facility] [address] [place], [place] - [address]  [facility] - [place], [place] - [address] [place] [address] PHARMAC[address] - [place], [place] - [address] [facility]    Clinical concerns: none       Adeline Rivera LPN              "

## 2023-07-10 NOTE — PATIENT INSTRUCTIONS
We will see you back in 6 months. Please go to the clinic lab 30 minutes prior to your appointment for labs

## 2023-07-11 NOTE — PROGRESS NOTES
Visit Date: 07/10/2023    HEMATOLOGY ONCOLOGY CLINIC NOTE     HISTORY OF PRESENT ILLNESS:  The patient returns for followup of stage 0 chronic lymphocytic leukemia with history of lung nodules. We had seen the patient in consultation at the request of Dr. Vanessa Hastings on 09/24/2020.  At that time, the patient was a 50-year-old white male with a history of tobacco abuse. We were asked to evaluate concerning diagnosis of lymphocytosis and pulmonary nodules.  He presented to the emergency room with right sided abdominal pain, was found to have ureteral stone with mild hydronephrosis.  The patient was given IV fluids.  At that time, a CBC was drawn revealed a white count of 17.5 with H and H 15.6, 42.3, platelet count 241,000.  Peripheral blood smear was ordered and was read as mild lymphocytosis, which could be associated with CLL or possibly well-differentiated lymphocytic lymphoma.  The patient also had a CT chest done on 12/05/2020 which was read as multiple small noncalcified pulmonary nodules.  It was mainly in the right and left lower lobes, all measured less than 6 mm.  The patient did have a history of tobacco abuse in the past and quit 13 years ago. He was in [place] and was a war vet there and was in the [employer].  He denies any fevers, night sweats, weight loss or bone pain.  When we saw the patient, we wanted to rule out CLL.  We obtained peripheral blood for flow cytometry.  This revealed the patient had 37% CD5 positive kappa monotypic cells.  This was all consistent with CLL.  We ordered a PET scan, was essentially negative.  Lung nodules seen on previous CT were not PET avid.  There was no lymphadenopathy or splenomegaly.  When we saw the patient on 01/06/2021, we recommended a FISH panel to rule out 17p deletion.  The patient was found to be 17p deletion negative; therefore, the prognosis was excellent and the plan was to watch and wait.  He had a repeat CT chest on 07/20/2021 which was read as stable  findings.  Otherwise, he was seen in a [place] emergency room with hematuria.  CT abdomen and pelvis revealed there was a left nephrolithiasis, nonobstructive stone in the ureteropelvic junction that measured 2 mm x 3 mm and this was causing obstructive uropathy.  There was a fatty liver, cholelithiasis.  There was a small hernia with fat content in the right inguinal region with no incarceration.  He was treated with IV fluids, Toradol, Zofran.  Urinalysis reveals red blood cells, He was given a dose of Flomax and prescribed oxycodone and Reglan.  He was told to follow up with urology, but had not been referred.  He had a repeat CT chest on 08/25/2021 and the findings were there were small pulmonary nodules in both lungs, stable from previous exam in 07/2021.  Recommend he see urology for his recurrent nephrolithiasis.  He saw Dr. Bal, who recommended a conservative approach.  In terms of prevention, he recommended a generic approach.  He recommended the patient increase his urine output to at least 2 liters per 24 hours.  He also recommend a low salt diet, increase citrate supplementation through lemon and orange and maintain a moderate intake diet.  He recommended to avoid the Atkins diet as well as high protein supplements including protein bars and protein powders.  He has not had any recurrence of his nephrolithiasis.  He comes in today doing relatively well./  Continues to work.  He is currently working in maintenance for [employer].  He denies any fevers, night sweats, weight loss, abdominal pain, or chest pain.  Overall he is doing well.  No early satiety.     PHYSICAL EXAMINATION:  GENERAL:  He is a well-developed, well-nourished, middle-aged white male in no acute distress.  ECOG performance status is 0.  VITAL SIGNS:  Reveal blood pressure 124/62, pulse 74, temperature 97.1.  HEENT:  Atraumatic, normocephalic.  Oropharynx nonerythematous.  NECK:  Supple.  LUNGS:  Clear to auscultation and  percussion.  HEART:  Regular rhythm.  S1, S2 normal.  ABDOMEN:  Soft, normoactive bowel sounds.  Spleen tip is palpable approximately 1 fingerbreadth below the left costal margin.  LYMPHATICS:  Reveal no cervical, supraclavicular, axillary or inguinal nodes.  EXTREMITIES:  No edema.  NEUROLOGIC:  Nonfocal.    LABORATORY DATA:  From today reveal CBC with white count of 17.4 with 12.7 lymphocytes, hemoglobin 15.8, hematocrit 45.5, platelet count 179.  BUN is 16.9, creatinine 0.81, glucose 110.  LFTs are normal.  LDH is 212.    IMPRESSION:    1.  Stage 0 chronic lymphocytic leukemia with no evidence of anemia, thrombocytopenia, splenomegaly or lymphadenopathy.  Lymphocyte count remains relatively stable.  The patient is asymptomatic.  Plan is to continue surveillance.  Will see the patient in 6 months with repeat CBC, CMP, LDH as well as blood morphology.   2.  History of lung nodules.  CT imaging has been stable.  Will consider imaging in 1 year but for now, no need for imaging at this point.  3.  Recurrent nephrolithiasis, resolved.    TIME:  68 minutes was spent on this patient.  Time was spent reviewing lab results with patient, previous physician provider notes, performing history and physical, documenting history and physical and ordering followup labs and scans.    Rm Erickson MD        D: 07/10/2023   T: 07/10/2023   MT: [email or web address]    Name:     JIMI KATHLEEN  MRN:      [id number]        Account:    [id number]   :      1970           Visit Date: 07/10/2023     Document: [id number]    cc:  Vanessa Hastings MD

## 2023-08-17 NOTE — ED NOTES
patient to ED with reports of sudden abdominal pain lower abdomen that started a few hours ago.  Pt was napping and it woke him from his nap.  Denies any heavy lifting or injury. Denies vomiting and diarrhea. Reports 2 normal stools today. Denies fever. Pt stated he has never had this pain before. Patient stated it feels like he needs to have a bowel movement  Patient is alert and ornt. Denies any dysuria. Pt has gallbladder and appendix. IV established with labs drawn  No history of kidney stones     Bcc Infiltrative Histology Text: There were numerous aggregates of basaloid cells demonstrating an infiltrative pattern.

## 2023-10-07 ENCOUNTER — APPOINTMENT (OUTPATIENT)
Dept: CT IMAGING | Facility: HOSPITAL | Age: 53
End: 2023-10-07
Attending: INTERNAL MEDICINE
Payer: COMMERCIAL

## 2023-10-07 ENCOUNTER — HOSPITAL ENCOUNTER (EMERGENCY)
Facility: HOSPITAL | Age: 53
Discharge: HOME | End: 2023-10-07
Attending: INTERNAL MEDICINE | Admitting: INTERNAL MEDICINE
Payer: COMMERCIAL

## 2023-10-07 VITALS
RESPIRATION RATE: 20 BRPM | OXYGEN SATURATION: 95 % | DIASTOLIC BLOOD PRESSURE: 105 MMHG | HEART RATE: 99 BPM | SYSTOLIC BLOOD PRESSURE: 134 MMHG | TEMPERATURE: 98.5 F | WEIGHT: 218 LBS | BODY MASS INDEX: 32.19 KG/M2

## 2023-10-07 DIAGNOSIS — T78.3XXA ANGIOEDEMA, INITIAL ENCOUNTER: ICD-10-CM

## 2023-10-07 LAB
ANION GAP SERPL CALCULATED.3IONS-SCNC: 11 MMOL/L (ref 7–15)
BASO+EOS+MONOS # BLD AUTO: ABNORMAL 10*3/UL
BASO+EOS+MONOS NFR BLD AUTO: ABNORMAL %
BASOPHILS # BLD AUTO: ABNORMAL 10*3/UL
BASOPHILS # BLD MANUAL: 0 10E3/UL (ref 0–0.2)
BASOPHILS NFR BLD AUTO: ABNORMAL %
BASOPHILS NFR BLD MANUAL: 0 %
BUN SERPL-MCNC: 15.1 MG/DL (ref 6–20)
CALCIUM SERPL-MCNC: 8.5 MG/DL (ref 8.6–10)
CHLORIDE SERPL-SCNC: 106 MMOL/L (ref 98–107)
CREAT SERPL-MCNC: 0.66 MG/DL (ref 0.67–1.17)
DEPRECATED HCO3 PLAS-SCNC: 22 MMOL/L (ref 22–29)
EGFRCR SERPLBLD CKD-EPI 2021: >90 ML/MIN/1.73M2
EOSINOPHIL # BLD AUTO: ABNORMAL 10*3/UL
EOSINOPHIL # BLD MANUAL: 0 10E3/UL (ref 0–0.7)
EOSINOPHIL NFR BLD AUTO: ABNORMAL %
EOSINOPHIL NFR BLD MANUAL: 0 %
ERYTHROCYTE [DISTWIDTH] IN BLOOD BY AUTOMATED COUNT: 13.4 % (ref 10–15)
GLUCOSE SERPL-MCNC: 116 MG/DL (ref 70–99)
HCT VFR BLD AUTO: 43 % (ref 40–53)
HGB BLD-MCNC: 14.5 G/DL (ref 13.3–17.7)
IMM GRANULOCYTES # BLD: ABNORMAL 10*3/UL
IMM GRANULOCYTES NFR BLD: ABNORMAL %
LYMPHOCYTES # BLD AUTO: ABNORMAL 10*3/UL
LYMPHOCYTES # BLD MANUAL: 7.5 10E3/UL (ref 0.8–5.3)
LYMPHOCYTES NFR BLD AUTO: ABNORMAL %
LYMPHOCYTES NFR BLD MANUAL: 39 %
MCH RBC QN AUTO: 30.4 PG (ref 26.5–33)
MCHC RBC AUTO-ENTMCNC: 33.7 G/DL (ref 31.5–36.5)
MCV RBC AUTO: 90 FL (ref 78–100)
MONOCYTES # BLD AUTO: ABNORMAL 10*3/UL
MONOCYTES # BLD MANUAL: 0.8 10E3/UL (ref 0–1.3)
MONOCYTES NFR BLD AUTO: ABNORMAL %
MONOCYTES NFR BLD MANUAL: 4 %
NEUTROPHILS # BLD AUTO: ABNORMAL 10*3/UL
NEUTROPHILS # BLD MANUAL: 11 10E3/UL (ref 1.6–8.3)
NEUTROPHILS NFR BLD AUTO: ABNORMAL %
NEUTROPHILS NFR BLD MANUAL: 57 %
NRBC # BLD AUTO: 0 10E3/UL
NRBC BLD AUTO-RTO: 0 /100
PLAT MORPH BLD: ABNORMAL
PLATELET # BLD AUTO: 175 10E3/UL (ref 150–450)
POTASSIUM SERPL-SCNC: 3.8 MMOL/L (ref 3.4–5.3)
RBC # BLD AUTO: 4.77 10E6/UL (ref 4.4–5.9)
RBC MORPH BLD: ABNORMAL
SODIUM SERPL-SCNC: 139 MMOL/L (ref 135–145)
VARIANT LYMPHS BLD QL SMEAR: PRESENT
WBC # BLD AUTO: 19.3 10E3/UL (ref 4–11)

## 2023-10-07 PROCEDURE — 85027 COMPLETE CBC AUTOMATED: CPT | Performed by: INTERNAL MEDICINE

## 2023-10-07 PROCEDURE — 250N000011 HC RX IP 250 OP 636: Mod: JZ | Performed by: INTERNAL MEDICINE

## 2023-10-07 PROCEDURE — 250N000011 HC RX IP 250 OP 636: Performed by: INTERNAL MEDICINE

## 2023-10-07 PROCEDURE — 36415 COLL VENOUS BLD VENIPUNCTURE: CPT | Performed by: INTERNAL MEDICINE

## 2023-10-07 PROCEDURE — 85007 BL SMEAR W/DIFF WBC COUNT: CPT | Performed by: INTERNAL MEDICINE

## 2023-10-07 PROCEDURE — 99284 EMERGENCY DEPT VISIT MOD MDM: CPT | Performed by: INTERNAL MEDICINE

## 2023-10-07 PROCEDURE — 99285 EMERGENCY DEPT VISIT HI MDM: CPT | Mod: 25

## 2023-10-07 PROCEDURE — 96365 THER/PROPH/DIAG IV INF INIT: CPT | Mod: XU

## 2023-10-07 PROCEDURE — 250N000012 HC RX MED GY IP 250 OP 636 PS 637: Performed by: INTERNAL MEDICINE

## 2023-10-07 PROCEDURE — 80048 BASIC METABOLIC PNL TOTAL CA: CPT | Performed by: INTERNAL MEDICINE

## 2023-10-07 PROCEDURE — 258N000003 HC RX IP 258 OP 636: Performed by: INTERNAL MEDICINE

## 2023-10-07 PROCEDURE — 96375 TX/PRO/DX INJ NEW DRUG ADDON: CPT | Mod: XU

## 2023-10-07 PROCEDURE — 70491 CT SOFT TISSUE NECK W/DYE: CPT

## 2023-10-07 RX ORDER — AMPICILLIN AND SULBACTAM 2; 1 G/1; G/1
3 INJECTION, POWDER, FOR SOLUTION INTRAMUSCULAR; INTRAVENOUS ONCE
Status: COMPLETED | OUTPATIENT
Start: 2023-10-07 | End: 2023-10-07

## 2023-10-07 RX ORDER — DIPHENHYDRAMINE HYDROCHLORIDE 50 MG/ML
50 INJECTION INTRAMUSCULAR; INTRAVENOUS ONCE
Status: COMPLETED | OUTPATIENT
Start: 2023-10-07 | End: 2023-10-07

## 2023-10-07 RX ORDER — DIPHENHYDRAMINE HCL 25 MG
25 CAPSULE ORAL EVERY 6 HOURS PRN
Qty: 15 CAPSULE | Refills: 0 | Status: SHIPPED | OUTPATIENT
Start: 2023-10-07 | End: 2023-10-11

## 2023-10-07 RX ORDER — METHYLPREDNISOLONE SODIUM SUCCINATE 125 MG/2ML
125 INJECTION, POWDER, LYOPHILIZED, FOR SOLUTION INTRAMUSCULAR; INTRAVENOUS ONCE
Status: COMPLETED | OUTPATIENT
Start: 2023-10-07 | End: 2023-10-07

## 2023-10-07 RX ORDER — PREDNISONE 20 MG/1
TABLET ORAL
Qty: 5 TABLET | Refills: 0 | Status: SHIPPED | OUTPATIENT
Start: 2023-10-07 | End: 2023-10-14

## 2023-10-07 RX ORDER — IOPAMIDOL 755 MG/ML
75 INJECTION, SOLUTION INTRAVASCULAR ONCE
Status: COMPLETED | OUTPATIENT
Start: 2023-10-07 | End: 2023-10-07

## 2023-10-07 RX ORDER — PREDNISONE 20 MG/1
40 TABLET ORAL ONCE
Status: COMPLETED | OUTPATIENT
Start: 2023-10-07 | End: 2023-10-07

## 2023-10-07 RX ADMIN — AMPICILLIN SODIUM AND SULBACTAM SODIUM 3 G: 2; 1 INJECTION, POWDER, FOR SOLUTION INTRAMUSCULAR; INTRAVENOUS at 01:35

## 2023-10-07 RX ADMIN — IOPAMIDOL 75 ML: 755 INJECTION, SOLUTION INTRAVENOUS at 01:49

## 2023-10-07 RX ADMIN — METHYLPREDNISOLONE SODIUM SUCCINATE 125 MG: 125 INJECTION, POWDER, FOR SOLUTION INTRAMUSCULAR; INTRAVENOUS at 01:16

## 2023-10-07 RX ADMIN — DIPHENHYDRAMINE HYDROCHLORIDE 50 MG: 50 INJECTION, SOLUTION INTRAMUSCULAR; INTRAVENOUS at 03:27

## 2023-10-07 RX ADMIN — PREDNISONE 40 MG: 20 TABLET ORAL at 05:33

## 2023-10-07 RX ADMIN — FAMOTIDINE 20 MG: 10 INJECTION, SOLUTION INTRAVENOUS at 03:27

## 2023-10-07 RX ADMIN — SODIUM CHLORIDE 1000 ML: 9 INJECTION, SOLUTION INTRAVENOUS at 01:15

## 2023-10-07 ASSESSMENT — ACTIVITIES OF DAILY LIVING (ADL)
ADLS_ACUITY_SCORE: 35

## 2023-10-07 NOTE — DISCHARGE INSTRUCTIONS
What to expect when you have contrast    During your exam, we will inject  contrast  into your vein or artery. (Contrast is a clear liquid with iodine in it. It shows up on X-rays.)    You may feel warm or hot. You may have a metal taste in your mouth and a slight upset stomach. You may also feel pressure near the kidneys and bladder. These effects will last about 1 to 3 minutes.    Please tell us if you have:   Sneezing    Itching   Hives    Swelling in the face   A hoarse voice   Breathing problems   Other new symptoms    Serious problems are rare.  They may include:   Irregular heartbeat    Seizures   Kidney failure             Tissue damage   Shock     Death    If you have any problems during the exam, we  will treat them right away.    When you get home    Call your hospital if you have any new symptoms in the next 2 days, like hives or swelling. (Phone numbers are at the bottom of this page.) Or call your family doctor.     If you have wheezing or trouble breathing, call 911.    Self-care  -Drink at least 4 extra glasses of water today.   This reduces the stress on your kidneys.  -Keep taking your regular medicines.    The contrast will pass out of your body in your  Urine(pee). This will happen in the next 24 hours. You  will not feel this. Your urine will not  change color.    If you have kidney problems or take metformin    Drink 4 to 8 large glasses of water for the next  2 days, if you are not on a fluid restriction.    ?If you take metformin (Glucophage or Glucovance) for diabetes, keep taking it.      ?Your kidney function tests are abnormal.  If you take Metformin, do not take it for 48 hours. Please go to your clinic for a blood test within 3 days after your exam before the restarting this medicine.     (Note to provider:please give patient prescription for lab tests.)        I have read and understand the above information.    Patient Sign  Here:______________________________________Date:________Time:______    Staff Sign Here:________________________________________Date:_______Time:______      Radiology Departments:     ?[facility]: [phone] ?[place]: [phone]     ?[place]: [phone] ?[place]:[phone]      ?[place]: [phone]  ?[place]: [phone]  ?[place]:[phone]    ?[facility]:[phone]  ?[facility] [place]:[phone]

## 2023-10-07 NOTE — ED PROVIDER NOTES
History     Chief Complaint   Patient presents with    Pharyngitis     The history is provided by the patient.   Pharyngitis  Location:  Right  Quality:  Aching  Severity:  Severe  Onset quality:  Gradual  Duration:  3 days  Timing:  Constant  Progression:  Worsening  Chronicity:  New  Associated symptoms: shortness of breath and trouble swallowing    Associated symptoms: no abdominal pain, no chest pain, no chills, no cough, no drooling, no fever, no headaches, no rash and no voice change        Allergies:  No Known Allergies    Problem List:    Patient Active Problem List    Diagnosis Date Noted    CLL (chronic lymphocytic leukemia) (H) 12/21/2020     Priority: Medium    Pulmonary nodules 11/27/2020     Priority: Medium    Abnormal CT scan 11/27/2020     Priority: Medium    Ureteral calculus 11/27/2020     Priority: Medium    [place] cardiac risk <10% in next 10 years 03/28/2019     Priority: Medium     3/28/19:  Age 48; smoking: no; diabetes: no; hypertension: no; systolic blood pressure:  110; date of lipid:  11/21/17; HDL:  44; total cholesterol:  174; statin: no; RISK:  2%.      Ankle pain 04/02/2015     Priority: Medium    Gastroesophageal reflux disease 11/27/2007     Priority: Medium        Past Medical History:    Past Medical History:   Diagnosis Date    Arthritis     Cancer (H)     GERD (gastroesophageal reflux disease)        Past Surgical History:    No past surgical history on file.    Family History:    Family History   Problem Relation Age of Onset    Obesity Mother     Cerebrovascular Disease Mother     Diabetes No family hx of     Coronary Artery Disease No family hx of     Hypertension No family hx of     Hyperlipidemia No family hx of     Breast Cancer No family hx of     Colon Cancer No family hx of     Prostate Cancer No family hx of     Anesthesia Reaction No family hx of     Asthma No family hx of     Genetic Disorder No family hx of     Thyroid Disease No family hx of        Social  History:  Marital Status:   [2]  Social History     Tobacco Use    Smoking status: Former     Types: Cigarettes     Quit date: 2006     Years since quittin.7    Smokeless tobacco: Never   Vaping Use    Vaping Use: Never used   Substance Use Topics    Alcohol use: Yes     Comment: a case a year at the most.     Drug use: Never        Medications:    amoxicillin-clavulanate (AUGMENTIN) 875-125 MG tablet  diphenhydrAMINE (BENADRYL) 25 MG capsule  predniSONE (DELTASONE) 20 MG tablet  ibuprofen (ADVIL/MOTRIN) 200 MG tablet  omeprazole (PRILOSEC) 20 MG DR capsule          Review of Systems   Constitutional:  Negative for chills, diaphoresis and fever.   HENT:  Positive for sore throat and trouble swallowing. Negative for drooling and voice change.    Eyes:  Negative for visual disturbance.   Respiratory:  Positive for shortness of breath. Negative for cough, chest tightness and wheezing.    Cardiovascular:  Negative for chest pain, palpitations and leg swelling.   Gastrointestinal:  Negative for abdominal distention, abdominal pain, anal bleeding, blood in stool, nausea and vomiting.   Genitourinary:  Negative for decreased urine volume, dysuria, flank pain and frequency.   Musculoskeletal:  Negative for back pain, gait problem, myalgias and neck pain.   Skin:  Negative for color change, pallor and rash.   Neurological:  Negative for dizziness, syncope, weakness, light-headedness, numbness and headaches.   Psychiatric/Behavioral:  Negative for confusion, sleep disturbance and suicidal ideas.        Physical Exam   BP: 146/100  Pulse: 82  Temp: 98.5  F (36.9  C)  Resp: 18  Weight: 98.9 kg (218 lb)  SpO2: 100 %      Physical Exam  Vitals and nursing note reviewed.   Constitutional:       Appearance: He is well-developed.   HENT:      Head: Normocephalic and atraumatic.      Mouth/Throat:      Pharynx: Pharyngeal swelling and uvula swelling present.      Tonsils: No tonsillar abscesses.        Comments:  Swelling of right soft palate and tonsill    Eyes:      Conjunctiva/sclera: Conjunctivae normal.      Pupils: Pupils are equal, round, and reactive to light.   Neck:      Thyroid: No thyromegaly.      Vascular: No JVD.      Trachea: No tracheal deviation.   Cardiovascular:      Rate and Rhythm: Normal rate and regular rhythm.      Heart sounds: Normal heart sounds. No murmur heard.     No gallop.   Pulmonary:      Effort: Pulmonary effort is normal. No respiratory distress.      Breath sounds: Normal breath sounds. No stridor. No wheezing or rales.   Chest:      Chest wall: No tenderness.   Abdominal:      General: Bowel sounds are normal. There is no distension.      Palpations: Abdomen is soft. There is no mass.      Tenderness: There is no abdominal tenderness. There is no guarding or rebound.   Musculoskeletal:         General: No tenderness. Normal range of motion.      Cervical back: Normal range of motion and neck supple.   Lymphadenopathy:      Cervical: No cervical adenopathy.   Skin:     General: Skin is warm.      Coloration: Skin is not pale.      Findings: No erythema or rash.   Neurological:      Mental Status: He is alert and oriented to person, place, and time.   Psychiatric:         Behavior: Behavior normal.         ED Course                 Procedures                  Results for orders placed or performed during the hospital encounter of 10/07/23 (from the past 24 hour(s))   Basic metabolic panel   Result Value Ref [place]    Sodium 139 135 - 145 mmol/L    Potassium 3.8 3.4 - 5.3 mmol/L    Chloride 106 98 - 107 mmol/L    Carbon Dioxide (CO2) 22 22 - 29 mmol/L    Anion Gap 11 7 - 15 mmol/L    Urea Nitrogen 15.1 6.0 - 20.0 mg/dL    Creatinine 0.66 (L) 0.67 - 1.17 mg/dL    GFR Estimate >90 >60 mL/min/1.73m2    Calcium 8.5 (L) 8.6 - 10.0 mg/dL    Glucose 116 (H) 70 - 99 mg/dL   CBC with Platelets & Differential    Narrative    The following orders were created for panel order CBC with Platelets &  Differential.  Procedure                               Abnormality         Status                     ---------                               -----------         ------                     CBC with platelets and d...[[id number]]  Abnormal            Final result               Manual Differential[[id number]]          Abnormal            Final result                 Please view results for these tests on the individual orders.   CBC with platelets and differential   Result Value Ref [place]    WBC Count 19.3 (H) 4.0 - 11.0 10e3/uL    RBC Count 4.77 4.40 - 5.90 10e6/uL    Hemoglobin 14.5 13.3 - 17.7 g/dL    Hematocrit 43.0 40.0 - 53.0 %    MCV 90 78 - 100 fL    MCH 30.4 26.5 - 33.0 pg    MCHC 33.7 31.5 - 36.5 g/dL    RDW 13.4 10.0 - 15.0 %    Platelet Count 175 150 - 450 10e3/uL    % Neutrophils      % Lymphocytes      % Monocytes      Mids % (Monos, Eos, Basos)      % Eosinophils      % Basophils      % Immature Granulocytes      NRBCs per 100 WBC 0 <1 /100    Absolute Neutrophils      Absolute Lymphocytes      Absolute Monocytes      Mids Abs (Monos, Eos, Basos)      Absolute Eosinophils      Absolute Basophils      Absolute Immature Granulocytes      Absolute NRBCs 0.0 10e3/uL   Manual Differential   Result Value Ref [place]    % Neutrophils 57 %    % Lymphocytes 39 %    % Monocytes 4 %    % Eosinophils 0 %    % Basophils 0 %    Absolute Neutrophils 11.0 (H) 1.6 - 8.3 10e3/uL    Absolute Lymphocytes 7.5 (H) 0.8 - 5.3 10e3/uL    Absolute Monocytes 0.8 0.0 - 1.3 10e3/uL    Absolute Eosinophils 0.0 0.0 - 0.7 10e3/uL    Absolute Basophils 0.0 0.0 - 0.2 10e3/uL    RBC Morphology Confirmed RBC Indices     Platelet Assessment  Automated Count Confirmed. Platelet morphology is normal.     Automated Count Confirmed. Platelet morphology is normal.    Reactive Lymphocytes Present (A) None Seen       Medications   sodium chloride 0.9% BOLUS 1,000 mL (0 mLs Intravenous Stopped 10/7/23 0319)   ampicillin-sulbactam (UNASYN) 3 g vial to  attach to  mL bag (0 g Intravenous Stopped 10/7/23 0249)   methylPREDNISolone sodium succinate (solu-MEDROL) injection 125 mg (125 mg Intravenous $Given 10/7/23 0116)   iopamidol (ISOVUE-370) solution 75 mL (75 mLs Intravenous $Given 10/7/23 0149)   sodium chloride (PF) 0.9% PF flush 60 mL (60 mLs Intravenous $Given 10/7/23 0150)   famotidine (PEPCID) injection 20 mg (20 mg Intravenous $Given 10/7/23 0327)   diphenhydrAMINE (BENADRYL) injection 50 mg (50 mg Intravenous $Given 10/7/23 0327)   predniSONE (DELTASONE) tablet 40 mg (40 mg Oral $Given 10/7/23 0533)       Assessments & Plan (with Medical Decision Making)   Sore throat for 3 days, woke up due to feeling sore thraot and swelling in throat    IV steroid, IV Unasyn given  CT neck: enlarge tonsils and lymph nodes  Pt observed in ER, symptoms improved    Likely angioedema vs developing para tonsilar abscess    Augmentin ++ prednisone taper + benedryl    D C home , follow-up with PCP  I have reviewed the nursing notes.    I have reviewed the findings, diagnosis, plan and need for follow up with the patient.        New Prescriptions    AMOXICILLIN-CLAVULANATE (AUGMENTIN) 875-125 MG TABLET    Take 1 tablet by mouth 2 times daily for 6 days    DIPHENHYDRAMINE (BENADRYL) 25 MG CAPSULE    Take 1 capsule (25 mg) by mouth every 6 hours as needed for itching or allergies    PREDNISONE (DELTASONE) 20 MG TABLET    1 tab daily for 3 days, then 1/2 tab daily for 4 days       Final diagnoses:   Angioedema, initial encounter       10/7/2023   [facility]       Scott Garcia MD  10/10/23 8725

## 2023-10-07 NOTE — ED TRIAGE NOTES
Patient presents w/ c/o a sore throat for 2-3 days.   Denies fever, cough, chills.   OTC meds are not working for him.      Triage Assessment       Row Name 10/07/23 0033       Triage Assessment (Adult)    Airway WDL WDL       Respiratory WDL    Respiratory WDL WDL;expansion/retractions;rhythm/pattern    Rhythm/Pattern, Respiratory unlabored;pattern regular;depth regular;no shortness of breath reported    Expansion/Accessory Muscles/Retractions no retractions;no use of accessory muscles;expansion symmetric       Cognitive/Neuro/Behavioral WDL    Cognitive/Neuro/Behavioral WDL WDL

## 2023-10-09 ENCOUNTER — TELEPHONE (OUTPATIENT)
Dept: FAMILY MEDICINE | Facility: OTHER | Age: 53
End: 2023-10-09

## 2023-10-09 NOTE — TELEPHONE ENCOUNTER
"Request for possible overbook or to schedule with NP.    Patient seen in ED 10/07/23 for Angioedema.  Patient states he was prescribed Prednisone and Amoxicillin. Patient states pain has improved but \"choking\" feeling in the middle of the night remains the same. Patient denies any current shortness of breath or \"choking\" feeling during the day. Per AVS patient to be seen by PCP within three days. Request for possible overbook or to schedule with NP due to PCP schedule being full. Writer informed patient to call back or be reseen in UC/ED with any new or worsening symptoms. Patient verbalized understanding.    Emergency Department and Urgent Care Follow-up    Reason for ER/UC visit: Angioedema  Date seen: 10/07/23    New or Worsening symptoms:  no     Prescription Received/Picked up from Pharmacy?: Amoxicillin and prednisone and   Medications started? yes  Any questions or issues regarding your prescription?: no    Follow-up Results or Labs that are pending: completed     Questions or concerns?: concerned with continued symptoms     ER Recommends Follow-up by: within three days    RN Recommendations: be reseen in UC/ED with any new or worsening symptoms  Appointment scheduled: request for possible overbook.    If you start feeling worse, or have any further questions, please feel free to contact Nurse Triage at [phone].  If needing immediate medical attention at any time please call 911/Go to the ER.    "

## 2023-10-10 ASSESSMENT — ENCOUNTER SYMPTOMS
HEADACHES: 0
FLANK PAIN: 0
NECK PAIN: 0
WEAKNESS: 0
WHEEZING: 0
NAUSEA: 0
COUGH: 0
FEVER: 0
SHORTNESS OF BREATH: 1
NUMBNESS: 0
DYSURIA: 0
CHEST TIGHTNESS: 0
COLOR CHANGE: 0
SORE THROAT: 1
DIZZINESS: 0
BLOOD IN STOOL: 0
MYALGIAS: 0
ABDOMINAL PAIN: 0
CHILLS: 0
ANAL BLEEDING: 0
VOICE CHANGE: 0
FREQUENCY: 0
SLEEP DISTURBANCE: 0
VOMITING: 0
PALPITATIONS: 0
CONFUSION: 0
ABDOMINAL DISTENTION: 0
LIGHT-HEADEDNESS: 0
DIAPHORESIS: 0
BACK PAIN: 0
TROUBLE SWALLOWING: 1

## 2023-12-27 ENCOUNTER — OFFICE VISIT (OUTPATIENT)
Dept: FAMILY MEDICINE | Facility: OTHER | Age: 53
End: 2023-12-27
Attending: FAMILY MEDICINE
Payer: COMMERCIAL

## 2023-12-27 VITALS
SYSTOLIC BLOOD PRESSURE: 110 MMHG | WEIGHT: 225.8 LBS | BODY MASS INDEX: 33.34 KG/M2 | HEART RATE: 84 BPM | DIASTOLIC BLOOD PRESSURE: 80 MMHG | TEMPERATURE: 97.7 F | OXYGEN SATURATION: 96 % | RESPIRATION RATE: 17 BRPM

## 2023-12-27 DIAGNOSIS — K21.9 GASTROESOPHAGEAL REFLUX DISEASE WITHOUT ESOPHAGITIS: ICD-10-CM

## 2023-12-27 DIAGNOSIS — R73.03 PREDIABETES: ICD-10-CM

## 2023-12-27 DIAGNOSIS — C91.10 CLL (CHRONIC LYMPHOCYTIC LEUKEMIA) (H): ICD-10-CM

## 2023-12-27 DIAGNOSIS — R59.1 LAD (LYMPHADENOPATHY): ICD-10-CM

## 2023-12-27 DIAGNOSIS — Z00.00 ROUTINE HISTORY AND PHYSICAL EXAMINATION OF ADULT: Primary | ICD-10-CM

## 2023-12-27 DIAGNOSIS — Z12.5 SCREENING FOR PROSTATE CANCER: ICD-10-CM

## 2023-12-27 LAB
ACANTHOCYTES BLD QL SMEAR: ABNORMAL
ALBUMIN SERPL BCG-MCNC: 4.4 G/DL (ref 3.5–5.2)
ALP SERPL-CCNC: 65 U/L (ref 40–150)
ALT SERPL W P-5'-P-CCNC: 48 U/L (ref 0–70)
ANION GAP SERPL CALCULATED.3IONS-SCNC: 13 MMOL/L (ref 7–15)
AST SERPL W P-5'-P-CCNC: 35 U/L (ref 0–45)
AUER BODIES BLD QL SMEAR: ABNORMAL
BASO STIPL BLD QL SMEAR: ABNORMAL
BASOPHILS # BLD AUTO: 0.1 10E3/UL (ref 0–0.2)
BASOPHILS NFR BLD AUTO: 0 %
BILIRUB SERPL-MCNC: 0.9 MG/DL
BITE CELLS BLD QL SMEAR: ABNORMAL
BLISTER CELLS BLD QL SMEAR: ABNORMAL
BUN SERPL-MCNC: 14.8 MG/DL (ref 6–20)
BURR CELLS BLD QL SMEAR: ABNORMAL
CALCIUM SERPL-MCNC: 9.3 MG/DL (ref 8.6–10)
CHLORIDE SERPL-SCNC: 104 MMOL/L (ref 98–107)
CHOLEST SERPL-MCNC: 169 MG/DL
CREAT SERPL-MCNC: 0.82 MG/DL (ref 0.67–1.17)
DACRYOCYTES BLD QL SMEAR: ABNORMAL
DEPRECATED HCO3 PLAS-SCNC: 23 MMOL/L (ref 22–29)
EGFRCR SERPLBLD CKD-EPI 2021: >90 ML/MIN/1.73M2
ELLIPTOCYTES BLD QL SMEAR: ABNORMAL
EOSINOPHIL # BLD AUTO: 0.2 10E3/UL (ref 0–0.7)
EOSINOPHIL NFR BLD AUTO: 1 %
ERYTHROCYTE [DISTWIDTH] IN BLOOD BY AUTOMATED COUNT: 13.9 % (ref 10–15)
EST. AVERAGE GLUCOSE BLD GHB EST-MCNC: 120 MG/DL
FASTING STATUS PATIENT QL REPORTED: YES
FRAGMENTS BLD QL SMEAR: ABNORMAL
GLUCOSE SERPL-MCNC: 116 MG/DL (ref 70–99)
HBA1C MFR BLD: 5.8 %
HCT VFR BLD AUTO: 45.5 % (ref 40–53)
HDLC SERPL-MCNC: 40 MG/DL
HGB BLD-MCNC: 15.8 G/DL (ref 13.3–17.7)
HGB C CRYSTALS: ABNORMAL
HOWELL-JOLLY BOD BLD QL SMEAR: ABNORMAL
IMM GRANULOCYTES # BLD: 0.1 10E3/UL
IMM GRANULOCYTES NFR BLD: 0 %
LDH SERPL L TO P-CCNC: 192 U/L (ref 0–250)
LDLC SERPL CALC-MCNC: 95 MG/DL
LYMPHOCYTES # BLD AUTO: 11.9 10E3/UL (ref 0.8–5.3)
LYMPHOCYTES NFR BLD AUTO: 74 %
MCH RBC QN AUTO: 30.5 PG (ref 26.5–33)
MCHC RBC AUTO-ENTMCNC: 34.7 G/DL (ref 31.5–36.5)
MCV RBC AUTO: 88 FL (ref 78–100)
MONOCYTES # BLD AUTO: 0.7 10E3/UL (ref 0–1.3)
MONOCYTES NFR BLD AUTO: 4 %
NEUTROPHILS # BLD AUTO: 3.5 10E3/UL (ref 1.6–8.3)
NEUTROPHILS NFR BLD AUTO: 21 %
NEUTS HYPERSEG BLD QL SMEAR: ABNORMAL
NONHDLC SERPL-MCNC: 129 MG/DL
NRBC # BLD AUTO: 0.1 10E3/UL
NRBC BLD AUTO-RTO: 0 /100
PLAT MORPH BLD: ABNORMAL
PLATELET # BLD AUTO: 192 10E3/UL (ref 150–450)
POLYCHROMASIA BLD QL SMEAR: ABNORMAL
POTASSIUM SERPL-SCNC: 4 MMOL/L (ref 3.4–5.3)
PROT SERPL-MCNC: 7.4 G/DL (ref 6.4–8.3)
PSA SERPL DL<=0.01 NG/ML-MCNC: 3.88 NG/ML (ref 0–3.5)
RBC # BLD AUTO: 5.18 10E6/UL (ref 4.4–5.9)
RBC AGGLUT BLD QL: ABNORMAL
RBC MORPH BLD: ABNORMAL
ROULEAUX BLD QL SMEAR: ABNORMAL
SICKLE CELLS BLD QL SMEAR: ABNORMAL
SMUDGE CELLS BLD QL SMEAR: PRESENT
SODIUM SERPL-SCNC: 140 MMOL/L (ref 135–145)
SPHEROCYTES BLD QL SMEAR: ABNORMAL
STOMATOCYTES BLD QL SMEAR: ABNORMAL
TARGETS BLD QL SMEAR: ABNORMAL
TOXIC GRANULES BLD QL SMEAR: ABNORMAL
TRIGL SERPL-MCNC: 168 MG/DL
VARIANT LYMPHS BLD QL SMEAR: PRESENT
WBC # BLD AUTO: 16.3 10E3/UL (ref 4–11)

## 2023-12-27 PROCEDURE — 90677 PCV20 VACCINE IM: CPT | Performed by: FAMILY MEDICINE

## 2023-12-27 PROCEDURE — 83615 LACTATE (LD) (LDH) ENZYME: CPT | Performed by: FAMILY MEDICINE

## 2023-12-27 PROCEDURE — 83036 HEMOGLOBIN GLYCOSYLATED A1C: CPT | Performed by: FAMILY MEDICINE

## 2023-12-27 PROCEDURE — G0103 PSA SCREENING: HCPCS | Performed by: FAMILY MEDICINE

## 2023-12-27 PROCEDURE — 90472 IMMUNIZATION ADMIN EACH ADD: CPT | Performed by: FAMILY MEDICINE

## 2023-12-27 PROCEDURE — 90750 HZV VACC RECOMBINANT IM: CPT | Performed by: FAMILY MEDICINE

## 2023-12-27 PROCEDURE — 80061 LIPID PANEL: CPT | Performed by: FAMILY MEDICINE

## 2023-12-27 PROCEDURE — 90471 IMMUNIZATION ADMIN: CPT | Performed by: FAMILY MEDICINE

## 2023-12-27 PROCEDURE — 80053 COMPREHEN METABOLIC PANEL: CPT | Performed by: FAMILY MEDICINE

## 2023-12-27 PROCEDURE — 99396 PREV VISIT EST AGE 40-64: CPT | Mod: 25 | Performed by: FAMILY MEDICINE

## 2023-12-27 PROCEDURE — 36415 COLL VENOUS BLD VENIPUNCTURE: CPT | Performed by: FAMILY MEDICINE

## 2023-12-27 PROCEDURE — 85025 COMPLETE CBC W/AUTO DIFF WBC: CPT | Performed by: FAMILY MEDICINE

## 2023-12-27 ASSESSMENT — ENCOUNTER SYMPTOMS
HEARTBURN: 1
EYE PAIN: 0
WEAKNESS: 0
HEMATURIA: 0
FEVER: 0
ABDOMINAL PAIN: 0
ARTHRALGIAS: 1
PALPITATIONS: 0
DIARRHEA: 0
NAUSEA: 0
NERVOUS/ANXIOUS: 0
FREQUENCY: 0
DIZZINESS: 0
CHILLS: 0
HEMATOCHEZIA: 0
HEADACHES: 0
COUGH: 0
PARESTHESIAS: 0
SHORTNESS OF BREATH: 0
CONSTIPATION: 0
MYALGIAS: 0
DYSURIA: 0
SORE THROAT: 0
JOINT SWELLING: 0

## 2023-12-27 ASSESSMENT — PAIN SCALES - GENERAL: PAINLEVEL: NO PAIN (0)

## 2023-12-27 NOTE — PROGRESS NOTES
SUBJECTIVE:   Branden is a 53 year old, presenting for the following:  Physical    {(!) Visit Details have not yet been documented.  Please enter Visit Details and then use this list to pull in documentation. (Optional):136009}    Are you in the first 12 months of your Medicare coverage?  { :601083}    HPI        Have you ever done Advance Care Planning? (For example, a Health Directive, POLST, or a discussion with a medical provider or your loved ones about your wishes): { :834299}    {Hearing Test Done (Optional):907154}   Fall risk  { :885123}  {If any of the above assessments are answered yes, consider ordering appropriate referrals (Optional):590372}  Cognitive Screening { :571013}    {Do you have sleep apnea, excessive snoring or daytime drowsiness? (Optional):372801}    Reviewed and updated as needed this visit by clinical staff                  Reviewed and updated as needed this visit by Provider                 Social History     Tobacco Use    Smoking status: Former     Types: Cigarettes     Quit date: 2006     Years since quittin.9    Smokeless tobacco: Never   Substance Use Topics    Alcohol use: Yes     Comment: a case a year at the most.      {Rooming staff  Click this link to complete the Prescreen if response below is not for today's visit  Alcohol Use Prescreen >3 drinks/day or > 7 drinks/week.  If the prescreen question answer is YES, complete the full AUDIT  :698575}        2022     3:34 PM   Alcohol Use   Prescreen: >3 drinks/day or >7 drinks/week? No   {add AUDIT responses (Optional) (A score of 7 for adult men is an indication of hazardous drinking; a score of 8 or more is an indication of an alcohol use disorder.  A score of 7 or more for adult women is an indication of hazardous drinking or an alchohol use disorder):995488}  Do you have a current opioid prescription? { :443304}  Do you use any other controlled substances or medications that are not prescribed by a provider?  "{Substance Use :755916}  {Provider  If there are gaps in the social history shown above, please follow the link and refresh the note Link to Social and Substance History :067989}    {Outside tests to abstract? (Optional):639934}    {additional problems to add (Optional):751717}    Current providers sharing in care for this patient include: {Rooming staff:  Please update Care Team from storyboard, refresh this note and then delete this statement}  Patient Care Team:  Vanessa Hastings MD as PCP - General (Family Practice)  Vanessa Hastings MD as Assigned PCP  Rm Erickson MD as Assigned Cancer Care Provider  Roe Bal MD as Assigned Surgical Provider    The following health maintenance items are reviewed in Epic and correct as of today:  Health Maintenance   Topic Date Due    HEPATITIS B IMMUNIZATION (1 of 3 - 3-dose series) 1970    Pneumococcal Vaccine: Pediatrics (0 to 5 Years) and At-Risk Patients (6 to 64 Years) (1 of 2 - PCV) Never done    COVID-19 Vaccine (5 - 2023-24 season) 12/19/2023    LIPID  12/21/2023    ZOSTER IMMUNIZATION (2 of 2) 12/19/2023    YEARLY PREVENTIVE VISIT  12/21/2023    COLORECTAL CANCER SCREENING  12/13/2024    ADVANCE CARE PLANNING  12/23/2025    DTAP/TDAP/TD IMMUNIZATION (4 - Td or Tdap) 07/07/2030    HEPATITIS C SCREENING  Completed    HIV SCREENING  Completed    PHQ-2 (once per calendar year)  Completed    INFLUENZA VACCINE  Completed    IPV IMMUNIZATION  Aged Out    HPV IMMUNIZATION  Aged Out    MENINGITIS IMMUNIZATION  Aged Out    RSV MONOCLONAL ANTIBODY  Aged Out     {Chronicprobdata (optional):169853}  {Decision Support (Optional):912559}        Review of Systems  {ROS COMP (Optional):281297}    OBJECTIVE:   There were no vitals taken for this visit. Estimated body mass index is 32.19 kg/m  as calculated from the following:    Height as of 7/10/23: 1.753 m (5' 9\").    Weight as of 10/7/23: 98.9 kg (218 lb).  Physical Exam  {Exam (Optional) " ":822311}    {Diagnostic Test Results (Optional):248090}    ASSESSMENT / PLAN:   {Diag Picklist:633893}    {Patient advised of split billing (Optional):605934}      COUNSELING:  {Medicare Counselin}      BMI:   Estimated body mass index is 32.19 kg/m  as calculated from the following:    Height as of 7/10/23: 1.753 m (5' 9\").    Weight as of 10/7/23: 98.9 kg (218 lb).   {Weight Management Plan needed for ACO:065319}      He reports that he quit smoking about 17 years ago. His smoking use included cigarettes. He has never used smokeless tobacco.      Appropriate preventive services were discussed with this patient, including applicable screening as appropriate for fall prevention, nutrition, physical activity, Tobacco-use cessation, weight loss and cognition.  Checklist reviewing preventive services available has been given to the patient.    Reviewed patients plan of care and provided an AVS. The {CarePlan:454803} for Branden meets the Care Plan requirement. This Care Plan has been established and reviewed with the {PATIENT, FAMILY MEMBER, CAREGIVER:351086}.    {Counseling Resources  US Preventive Services Task Force  Cholesterol Screening  Health diet/nutrition  Pooled Cohorts Equation Calculator  USDA's MyPlate  ASA Prophylaxis  Lung CA Screening  Osteoporosis prevention/bone health :339017}  {Prostate Cancer Screening  Consider for men 55-69 per guidance from USPSTF :984609}    Vanessa Hastings MD  [facility] [place]    Identified Health Risks:  {Medicare required documentation of substance and opioid use disorders screening :467082}  "

## 2023-12-27 NOTE — PROGRESS NOTES
SUBJECTIVE:   Branden is a 53 year old, presenting for the following:    Physical        2023     7:48 AM   Additional Questions   Roomed by Marry Mallory   Accompanied by self       Healthy Habits:     Getting at least 3 servings of Calcium per day:  NO    Bi-annual eye exam:  Yes    Dental care twice a year:  Yes    Sleep apnea or symptoms of sleep apnea:  None    Diet:  Breakfast skipped    Frequency of exercise:  2-3 days/week    Duration of exercise:  15-30 minutes    Taking medications regularly:  Yes    Medication side effects:  None    Additional concerns today:  Yes    Mass - Neck and Breast  area    Duration: couple months  Description (location/character/radiation): left neck and right breast area   Intensity:  moderate  Accompanying signs and symptoms: Right breast mass moves and patient aware of left neck area lump which makes him nervous  History (similar episodes/previous evaluation): None  Precipitating or alleviating factors: None  Therapies tried and outcome: None     Social History     Tobacco Use    Smoking status: Former     Types: Cigarettes     Quit date: 2006     Years since quittin.9    Smokeless tobacco: Never   Substance Use Topics    Alcohol use: Yes     Comment: a case a year at the most.          2023     7:46 AM   Alcohol Use   Prescreen: >3 drinks/day or >7 drinks/week? No     Last PSA:   Prostate Specific Antigen Screen   Date Value Ref Range Status   2022 1.79 0.00 - 4.00 ug/L Final     Reviewed orders with patient. Reviewed health maintenance and updated orders accordingly - Yes    Reviewed and updated as needed this visit by clinical staff   Tobacco  Allergies  Meds  Problems  Med Hx  Surg Hx  Fam Hx          Reviewed and updated as needed this visit by Provider   Tobacco  Allergies  Meds  Problems  Med Hx  Surg Hx  Fam Hx           Review of Systems   Constitutional:  Negative for chills and fever.   HENT:  Negative for congestion, ear  pain, hearing loss and sore throat.    Eyes:  Negative for pain and visual disturbance.   Respiratory:  Negative for cough and shortness of breath.    Cardiovascular:  Negative for chest pain, palpitations and peripheral edema.   Gastrointestinal:  Positive for heartburn. Negative for abdominal pain, constipation, diarrhea, hematochezia and nausea.   Genitourinary:  Negative for dysuria, frequency, genital sores, hematuria, impotence, penile discharge and urgency.   Musculoskeletal:  Positive for arthralgias. Negative for joint swelling and myalgias.   Skin:  Negative for rash.   Neurological:  Negative for dizziness, weakness, headaches and paresthesias.   Psychiatric/Behavioral:  Negative for mood changes. The patient is not nervous/anxious.      OBJECTIVE:   /80   Pulse 84   Temp 97.7  F (36.5  C) (Tympanic)   Resp 17   Wt 102.4 kg (225 lb 12.8 oz)   SpO2 96%   BMI 33.34 kg/m      Physical Exam  GENERAL: alert and no distress  EYES: Eyes grossly normal to inspection, PERRL and conjunctivae and sclerae normal  HENT: ear canals and TM's normal, nose and mouth without ulcers or lesions  NECK: Pt has single 3mm sized mobile and hard LN posterior cervical chain left, no asymmetry, masses, or scars and thyroid normal to palpation  LAD: No LAD supraclavicular, axillary  RESP: lungs clear to auscultation - no rales, rhonchi or wheezes  CV: regular rate and rhythm, normal S1 S2, no S3 or S4, no murmur, click or rub, no peripheral edema and peripheral pulses strong  ABDOMEN: soft, nontender, no hepatosplenomegaly, no masses and bowel sounds normal  MS: no gross musculoskeletal defects noted, no edema  SKIN: Pt has smooth, superficial nodule right superior nipple, 4mm in size, no ulceration, scaling.   NEURO: Normal strength and tone, mentation intact and speech normal  PSYCH: mentation appears normal, affect normal/bright    Diagnostic Test Results:  Labs reviewed in Epic  No results found for any visits on  "12/27/23.    ASSESSMENT/PLAN:   Branden was seen today for physical.    Diagnoses and all orders for this visit:    Routine history and physical examination of adult    Prediabetes  -     Lipid Profile (Chol, Trig, HDL, LDL calc); Future  -     Hemoglobin A1c; Future    CLL (chronic lymphocytic leukemia) (H)  -     Comprehensive metabolic panel (BMP + Alb, Alk Phos, ALT, AST, Total. Bili, TP); Future  -     CBC with platelets and differential; Future  -     Lactate Dehydrogenase (LDH); Future    Gastroesophageal reflux disease without esophagitis  Stable    Screening for prostate cancer  -     PSA, screen; Future    LAD (lymphadenopathy)  Single small nodule, very firm. Given CLL will US  -     US Head Neck Soft Tissue; Future    Other orders  -     ZOSTER RECOMBINANT ADJUVANTED (SHINGRIX)  -     PNEUMOCOCCAL 20 VALENT CONJUGATE (PREVNAR 20)      Patient has been advised of split billing requirements and indicates understanding: Yes    COUNSELING:   Reviewed preventive health counseling, as reflected in patient instructions    BMI:   Estimated body mass index is 33.34 kg/m  as calculated from the following:    Height as of 7/10/23: 1.753 m (5' 9\").    Weight as of this encounter: 102.4 kg (225 lb 12.8 oz).   Weight management plan: Discussed healthy diet and exercise guidelines    He reports that he quit smoking about 17 years ago. His smoking use included cigarettes. He has never used smokeless tobacco.  Vanessa Hastings MD  [facility] [place]  "

## 2023-12-27 NOTE — COMMUNITY RESOURCES LIST (ENGLISH)
12/27/2023   [facility]  N/A  For questions about this resource list or additional care needs, please contact your primary care clinic or care manager.  Phone: [phone]   Email: N/A   Address: [address] [place] [id number]   Hours: N/A        Food and Nutrition       Food pantry  1  [facility] [place] Distance: 5.48 miles      Pickup   [address] [place] [address]  Language: English  Hours: Mon 9:00 AM - 11:00 AM , Tue 1:00 PM - 3:00 PM , Wed - Thu 9:00 AM - 11:00 AM  Fees: Free, Self Pay   Phone: (485) 328-7181 Email: [email or web address] Website: [email or web address]/[facility]/[place]     2  [facility] [employer] [facility] Free [facility] Distance: 16.25 miles      Pickup   [address] [place] [address]  Language: English  Hours: Mon - Sun Open 24 Hours  Fees: Free   Phone: (605) 949-2089 Email: lenka@[employer].org Website: http://www.[employer].org     SNAP application assistance  3  [facility] Human [facility] & [facility] Distance: 4.99 miles      In-Person, Phone/Virtual   [address] [place] [address]  Language: English  Hours: Mon - Fri 8:00 AM - 4:30 PM  Fees: Free   Phone: (758) 734-8641 Website: https://www.[facility].gov/kykkyzmmrgu-p-m/public-health-human-services/economic-services-supports     4  [facility] Human [facility] & [facility] Distance: 16.25 miles      In-Person, Phone/Virtual   [address] [place] [address]  Language: English  Hours: Mon - Fri 8:00 AM - 4:30 PM  Fees: Free   Phone: (684) 850-8719 Email: financialassistance@[facility].gov Website: https://www.[facility].gov/asdnbvjdjub-k-n/public-health-human-services/economic-services-supports     Soup kitchen or free meals  5  [facility] [place] [facility]  [facility] Distance: 5.48 miles      Pickup   [address] [place] [address]  Language: English  Hours: Mon - Fri 4:00 PM - 4:45 PM  Fees: Free   Phone: (754) 134-1573 Email: columba@[facility] [facility].[employer] Website: https://[facility].org/northern/[place]          Important Numbers & Websites       Emergency Services   911  [facility] Services   311  Poison Control   (499) 226-8741  Suicide Prevention Lifeline   (992) 108-9846 (TALK)  Child Abuse Hotline   (594) 319-6077 (4-A-Child)  Sexual Assault Hotline   (173) 425-4516 (HOPE)  National Runaway Safeline   (108) 243-1182 (RUNAWAY)  All-Options Talkline   (437) 362-7435  Substance Abuse Referral   (827) 227-3868 (HELP)

## 2023-12-29 ENCOUNTER — HOSPITAL ENCOUNTER (OUTPATIENT)
Dept: ULTRASOUND IMAGING | Facility: HOSPITAL | Age: 53
Discharge: HOME | End: 2023-12-29
Attending: FAMILY MEDICINE | Admitting: FAMILY MEDICINE
Payer: COMMERCIAL

## 2023-12-29 DIAGNOSIS — R59.1 LAD (LYMPHADENOPATHY): ICD-10-CM

## 2023-12-29 PROCEDURE — 76536 US EXAM OF HEAD AND NECK: CPT

## 2024-01-08 ENCOUNTER — ONCOLOGY VISIT (OUTPATIENT)
Dept: ONCOLOGY | Facility: OTHER | Age: 54
End: 2024-01-08
Attending: NURSE PRACTITIONER
Payer: COMMERCIAL

## 2024-01-08 VITALS
HEART RATE: 66 BPM | BODY MASS INDEX: 33.18 KG/M2 | OXYGEN SATURATION: 98 % | SYSTOLIC BLOOD PRESSURE: 128 MMHG | WEIGHT: 224 LBS | HEIGHT: 69 IN | DIASTOLIC BLOOD PRESSURE: 74 MMHG | TEMPERATURE: 97.5 F

## 2024-01-08 DIAGNOSIS — M54.2 NECK PAIN: ICD-10-CM

## 2024-01-08 DIAGNOSIS — C91.10 CLL (CHRONIC LYMPHOCYTIC LEUKEMIA) (H): Primary | ICD-10-CM

## 2024-01-08 PROCEDURE — 99214 OFFICE O/P EST MOD 30 MIN: CPT | Performed by: NURSE PRACTITIONER

## 2024-01-08 ASSESSMENT — PAIN SCALES - GENERAL: PAINLEVEL: NO PAIN (0)

## 2024-01-08 NOTE — PROGRESS NOTES
Hematology Follow-up Visit    Reason for Visit:  Branden is a 53 year old gentleman with a diagnosis of CLL, who presents to the clinic today for routine follow-up.    Nursing Note and documentation reviewed: Yes    Interval History: Branden is doing reasonably well. Was recently seen by PCP, noted to have a small, firm subcutaneous nodule in the left, posterior cervical chain. Ultrasound showed a possible mildly enlarged lymph node. Appt made to follow. No other lumps or bumps. Is having some right neck pain, intermittent, almost a burning sensation. No skin rash.     Did have a pharyngitis in Oct but no other recent or recurrent infection. No bleeding concerns. Appetite is good-- weight stable. Some bloating with certain foods but no continues abdominal bloating. No night sweats. Energy levels are good. No other adenopathy.     History of Present Illness: For full details regarding CLL, please see previous note of Dr. Erickson.     Current Treatment: None    Past Medical History:   Diagnosis Date    Arthritis     Cancer (H)     GERD (gastroesophageal reflux disease)     2016 - EGD without barretts changes.       Social History     Socioeconomic History    Marital status:      Spouse name: Maryam    Number of children: 6    Years of education: Not on file    Highest education level: Not on file   Occupational History    Not on file   Tobacco Use    Smoking status: Former     Types: Cigarettes     Quit date: 2006     Years since quittin.0    Smokeless tobacco: Never   Vaping Use    Vaping Use: Never used   Substance and Sexual Activity    Alcohol use: Yes     Comment: a case a year at the most.     Drug use: Never    Sexual activity: Yes     Partners: Female   Other Topics Concern    Parent/sibling w/ CABG, MI or angioplasty before 65F 55M? Not Asked   Social History Narrative    Preloaded 13     Social Determinants of Health     Financial Resource Strain: Low Risk  (2023)    Financial  Resource Strain     Within the past 12 months, have you or your family members you live with been unable to get utilities (heat, electricity) when it was really needed?: No   Food Insecurity: High Risk (12/27/2023)    Food Insecurity     Within the past 12 months, did you worry that your food would run out before you got money to buy more?: No     Within the past 12 months, did the food you bought just not last and you didn t have money to get more?: Yes   Transportation Needs: Low Risk  (12/27/2023)    Transportation Needs     Within the past 12 months, has lack of transportation kept you from medical appointments, getting your medicines, non-medical meetings or appointments, work, or from getting things that you need?: No   Physical Activity: Not on file   Stress: Not on file   Social Connections: Not on file   Interpersonal Safety: Low Risk  (12/27/2023)    Interpersonal Safety     Do you feel physically and emotionally safe where you currently live?: Yes     Within the past 12 months, have you been hit, slapped, kicked or otherwise physically hurt by someone?: No     Within the past 12 months, have you been humiliated or emotionally abused in other ways by your partner or ex-partner?: No   Housing Stability: Low Risk  (12/27/2023)    Housing Stability     Do you have housing? : Yes     Are you worried about losing your housing?: No       History reviewed. No pertinent surgical history.    Family History   Problem Relation Age of Onset    Obesity Mother     Cerebrovascular Disease Mother     Diabetes No family hx of     Coronary Artery Disease No family hx of     Hypertension No family hx of     Hyperlipidemia No family hx of     Breast Cancer No family hx of     Colon Cancer No family hx of     Prostate Cancer No family hx of     Anesthesia Reaction No family hx of     Asthma No family hx of     Genetic Disorder No family hx of     Thyroid Disease No family hx of        Allergies:  Allergies as of 01/08/2024     "(No Known Allergies)       Current Medications:  Current Outpatient Medications   Medication Sig Dispense Refill    ibuprofen (ADVIL/MOTRIN) 200 MG tablet Take 200 mg by mouth every 4 hours as needed for mild pain      omeprazole (PRILOSEC) 20 MG DR capsule Take 1 capsule (20 mg) by mouth daily 90 capsule 3        Review Of Systems:  A complete review of systems is negative except for the above mentioned items in the interval history.     Physical Exam:  /74 (BP Location: Left arm, Patient Position: Sitting, Cuff Size: Adult Regular)   Pulse 66   Temp 97.5  F (36.4  C) (Tympanic)   Ht 1.753 m (5' 9\")   Wt 101.6 kg (224 lb)   SpO2 98%   BMI 33.08 kg/m    GENERAL APPEARANCE: Healthy, alert and in no acute distress.  HEENT: Eyes appear normal without scleral icterus. Extraocular movements intact.   NECK: Supple with normal range of motion. No asymmetry or masses.  LYMPHATICS: One firm node the size of eraser posterior left neck. No other palpable adenopathy.   RESP: Lungs clear to auscultation bilaterally, respirations regular and easy.  CARDIOVASCULAR: Regular rate and rhythm. Normal S1, S2; no murmur, gallop, or rub.  ABDOMEN: Soft, non-tender, non-distended. Bowel sounds auscultated all 4 quadrants. No palpable organomegaly or masses.  MUSCULOSKELETAL: Extremities without gross deformities noted.   NEURO: Alert and oriented x 3.  Gait steady.  PSYCHIATRIC: Mentation and affect appear normal.  Mood appropriate.    Laboratory/Imaging Studies:  Component      Latest Ref [facility] 12/27/2023  8:38 AM   Platelet Morphology      Automated Count Confirmed. Platelet morphology is normal.  Automated Count Confirmed. Platelet morphology is normal.    Reactive Lymphs      None Seen  Present !    Smudge Cells      None Seen  Present !    RBC Morphology Confirmed RBC Indices    WBC      4.0 - 11.0 10e3/uL 16.3 (H)    RBC Count      4.40 - 5.90 10e6/uL 5.18    Hemoglobin      13.3 - 17.7 g/dL 15.8    Hematocrit      40.0 - " 53.0 % 45.5    MCV      78 - 100 fL 88    MCH      26.5 - 33.0 pg 30.5    MCHC      31.5 - 36.5 g/dL 34.7    RDW      10.0 - 15.0 % 13.9    Platelet Count      150 - 450 10e3/uL 192    % Neutrophils      % 21    % Lymphocytes      % 74    % Monocytes      % 4    % Eosinophils      % 1    % Basophils      % 0    % Immature Granulocytes      % 0    NRBCs per 100 WBC      <1 /100 0    Absolute Neutrophils      1.6 - 8.3 10e3/uL 3.5    Absolute Lymphocytes      0.8 - 5.3 10e3/uL 11.9 (H)    Absolute Monocytes      0.0 - 1.3 10e3/uL 0.7    Absolute Eosinophils      0.0 - 0.7 10e3/uL 0.2    Absolute Basophils      0.0 - 0.2 10e3/uL 0.1    Absolute Immature Granulocytes      <=0.4 10e3/uL 0.1    Absolute NRBCs      10e3/uL 0.1    Sodium      135 - 145 mmol/L 140    Potassium      3.4 - 5.3 mmol/L 4.0    Carbon Dioxide (CO2)      22 - 29 mmol/L 23    Anion Gap      7 - 15 mmol/L 13    Urea Nitrogen      6.0 - 20.0 mg/dL 14.8    Creatinine      0.67 - 1.17 mg/dL 0.82    GFR Estimate      >60 mL/min/1.73m2 >90    Calcium      8.6 - 10.0 mg/dL 9.3    Chloride      98 - 107 mmol/L 104    Glucose      70 - 99 mg/dL 116 (H)    Alkaline Phosphatase      40 - 150 U/L 65    AST      0 - 45 U/L 35    ALT      0 - 70 U/L 48    Protein Total      6.4 - 8.3 g/dL 7.4    Albumin      3.5 - 5.2 g/dL 4.4    Bilirubin Total      <=1.2 mg/dL 0.9    Lactate Dehydrogenase      0 - 250 U/L 192       Legend:  ! Abnormal  (H) High       ASSESSMENT/PLAN:    1.  Stage 0 chronic lymphocytic leukemia with no evidence of anemia, thrombocytopenia, or splenomegaly. One possible cervical node palpable. Has a 3 month follow-up with PCP. Seeing as labs are stable and feeling well, we will follow along and see how that visit goes. If enlarging or worsening symptoms, would recommend CT CAP and neck. Will review appt after seeing PCP in 3 months.     We will scheduled follow-up in 6 months with labs prior, sooner with concerns. Advised that is worsening  adenopathy, new adenopathy, increasing fatigue, night sweats, bloating, etc, that we should see him sooner.     2. Neck pain Intermittent burning ache on right side of neck. No visible or palpable abnormalities. Will monitor for now. Will reach out if symptoms worsen or fail to improve.     Patient in agreement with plan and verbalizes understanding. Agrees to call with any questions or concerns.    36 minutes spent in the patient's encounter today with time spent in review of patient's chart along with chart preparation and review of the treatment plan and signing of treatment plan.  Time was also spent with the patient in obtaining a review of systems and performing a physical exam along with detailed review of all test results.  Time was also spent in discussing plan for future follow-up and relating instructions for follow-up and in placing future orders.    NATALIYA Kwon, FNP-BC

## 2024-01-08 NOTE — NURSING NOTE
"Oncology Rooming Note    January 8, 2024 3:15 PM   Branden Becker is a 53 year old male who presents for:    Chief Complaint   Patient presents with    Oncology Clinic Visit     Follow up. CLL (chronic lymphocytic leukemia)      Initial Vitals: /74 (BP Location: Left arm, Patient Position: Sitting, Cuff Size: Adult Regular)   Pulse 66   Temp 97.5  F (36.4  C) (Tympanic)   Ht 1.753 m (5' 9\")   Wt 101.6 kg (224 lb)   SpO2 98%   BMI 33.08 kg/m   Estimated body mass index is 33.08 kg/m  as calculated from the following:    Height as of this encounter: 1.753 m (5' 9\").    Weight as of this encounter: 101.6 kg (224 lb). Body surface area is 2.22 meters squared.  No Pain (0) Comment: Data Unavailable   No LMP for male patient.  Allergies reviewed: Yes  Medications reviewed: Yes    Medications: Medication refills not needed today.  Pharmacy name entered into [facility]:    [facility] [address] [facility] [address] [facility] [address]  [facility] - [address] [facility] - [facility] [address] [facility]    Frailty Screening:   Is the patient here for a new oncology consult visit in cancer care? 2. No      Clinical concerns: notes some right sided neck pain that radiates up around his right ear, comes and goes, no pain right now. Not tender to the touch. Doesn't recall any injury or strain. Has taken Tylenol to help with the pain when it does hurt which has helped.  Zayra Abdullahi was notified.      Adeline Rivera LPN              "

## 2024-01-08 NOTE — PATIENT INSTRUCTIONS
Thank you for allowing me to be a part of your care today.    We would like to see you back in 6 months with labs completed one week prior, sooner with any concerns.     If you have any questions please call [phone]    Other instructions:      None

## 2024-04-11 ENCOUNTER — TELEPHONE (OUTPATIENT)
Dept: ONCOLOGY | Facility: OTHER | Age: 54
End: 2024-04-11

## 2024-04-11 NOTE — TELEPHONE ENCOUNTER
----- Message from NATALIYA Holguin CNP sent at 4/5/2024  8:42 AM CDT -----  Can we call this russell? I saw in Jan and he had palpable cervical nodes. Was to follow-up with Darling/Eunice and it looks like this was canceled?    How is his neck? Is he planning to follow-up? Was suppose to have ultrasound.    Otherwise I think I should see him back.     Zayra        ----- Message -----  From: Sujey Abdullahi APRN CNP  Sent: 4/5/2024  12:00 AM CDT  To: NATALIYA Holguin CNP    Review Darling note

## 2024-04-11 NOTE — TELEPHONE ENCOUNTER
Did leave a couple voicemails for patient to call me back in regards to message below. Still haven't heard back. Will continue to reach out, my direct number was left on his voicemail.

## 2024-04-16 ENCOUNTER — TELEPHONE (OUTPATIENT)
Dept: ONCOLOGY | Facility: OTHER | Age: 54
End: 2024-04-16

## 2024-04-16 NOTE — TELEPHONE ENCOUNTER
----- Message from Adeline Rivera LPN sent at 4/16/2024  8:42 AM CDT -----  I did leave another voicemail in regards to message below. Advised to let us know either way if possible how he is doing and if he plans to re follow up with Darling/Eunice.  ----- Message -----  From: Sujey Abdullahi APRN CNP  Sent: 4/16/2024   7:57 AM CDT  To: Adeline Rivera LPN    Any word back from this russell?    Zayra  ----- Message -----  From: Adeline Rivera LPN  Sent: 4/5/2024  10:34 AM CDT  To: NATALIYA Holguin CNP    Left message to call me back in regards to message below.  ----- Message -----  From: Sujey Abdullahi APRN CNP  Sent: 4/5/2024   8:44 AM CDT  To: Adeline Rivera LPN    Can we call this russell? I saw in Jan and he had palpable cervical nodes. Was to follow-up with Darling/Eunice and it looks like this was canceled?    How is his neck? Is he planning to follow-up? Was suppose to have ultrasound.    Otherwise I think I should see him back.     Zayra        ----- Message -----  From: Sujey Abdullahi APRN CNP  Sent: 4/5/2024  12:00 AM CDT  To: NATALIYA Holguin CNP    Review [place] note

## 2024-06-26 ENCOUNTER — LAB (OUTPATIENT)
Dept: LAB | Facility: OTHER | Age: 54
End: 2024-06-26
Payer: COMMERCIAL

## 2024-06-26 DIAGNOSIS — C91.10 CLL (CHRONIC LYMPHOCYTIC LEUKEMIA) (H): ICD-10-CM

## 2024-06-26 LAB
ACANTHOCYTES BLD QL SMEAR: ABNORMAL
ALBUMIN SERPL BCG-MCNC: 4.1 G/DL (ref 3.5–5.2)
ALP SERPL-CCNC: 61 U/L (ref 40–150)
ALT SERPL W P-5'-P-CCNC: 38 U/L (ref 0–70)
ANION GAP SERPL CALCULATED.3IONS-SCNC: 9 MMOL/L (ref 7–15)
AST SERPL W P-5'-P-CCNC: 25 U/L (ref 0–45)
AUER BODIES BLD QL SMEAR: ABNORMAL
BASO STIPL BLD QL SMEAR: ABNORMAL
BASOPHILS # BLD AUTO: 0.1 10E3/UL (ref 0–0.2)
BASOPHILS NFR BLD AUTO: 0 %
BILIRUB SERPL-MCNC: 0.7 MG/DL
BITE CELLS BLD QL SMEAR: SLIGHT
BLISTER CELLS BLD QL SMEAR: ABNORMAL
BUN SERPL-MCNC: 14.2 MG/DL (ref 6–20)
BURR CELLS BLD QL SMEAR: SLIGHT
CALCIUM SERPL-MCNC: 8.9 MG/DL (ref 8.6–10)
CHLORIDE SERPL-SCNC: 105 MMOL/L (ref 98–107)
CREAT SERPL-MCNC: 0.85 MG/DL (ref 0.67–1.17)
DACRYOCYTES BLD QL SMEAR: ABNORMAL
DEPRECATED HCO3 PLAS-SCNC: 24 MMOL/L (ref 22–29)
EGFRCR SERPLBLD CKD-EPI 2021: >90 ML/MIN/1.73M2
ELLIPTOCYTES BLD QL SMEAR: SLIGHT
EOSINOPHIL # BLD AUTO: 0.2 10E3/UL (ref 0–0.7)
EOSINOPHIL NFR BLD AUTO: 1 %
ERYTHROCYTE [DISTWIDTH] IN BLOOD BY AUTOMATED COUNT: 14.2 % (ref 10–15)
FRAGMENTS BLD QL SMEAR: ABNORMAL
GIANT PLATELETS BLD QL SMEAR: ABNORMAL
GLUCOSE SERPL-MCNC: 107 MG/DL (ref 70–99)
HCT VFR BLD AUTO: 44.3 % (ref 40–53)
HGB BLD-MCNC: 15 G/DL (ref 13.3–17.7)
HGB C CRYSTALS: ABNORMAL
HOWELL-JOLLY BOD BLD QL SMEAR: ABNORMAL
IMM GRANULOCYTES # BLD: 0 10E3/UL
IMM GRANULOCYTES NFR BLD: 0 %
LDH SERPL L TO P-CCNC: 187 U/L (ref 0–250)
LYMPHOCYTES # BLD AUTO: 10 10E3/UL (ref 0.8–5.3)
LYMPHOCYTES NFR BLD AUTO: 66 %
MCH RBC QN AUTO: 29.9 PG (ref 26.5–33)
MCHC RBC AUTO-ENTMCNC: 33.9 G/DL (ref 31.5–36.5)
MCV RBC AUTO: 88 FL (ref 78–100)
MONOCYTES # BLD AUTO: 0.8 10E3/UL (ref 0–1.3)
MONOCYTES NFR BLD AUTO: 6 %
NEUTROPHILS # BLD AUTO: 3.9 10E3/UL (ref 1.6–8.3)
NEUTROPHILS NFR BLD AUTO: 26 %
NEUTS HYPERSEG BLD QL SMEAR: ABNORMAL
NRBC # BLD AUTO: 0.1 10E3/UL
NRBC BLD AUTO-RTO: 1 /100
PATH REV: ABNORMAL
PLAT MORPH BLD: ABNORMAL
PLATELET # BLD AUTO: 180 10E3/UL (ref 150–450)
POLYCHROMASIA BLD QL SMEAR: ABNORMAL
POTASSIUM SERPL-SCNC: 4.2 MMOL/L (ref 3.4–5.3)
PROT SERPL-MCNC: 6.6 G/DL (ref 6.4–8.3)
RBC # BLD AUTO: 5.01 10E6/UL (ref 4.4–5.9)
RBC AGGLUT BLD QL: ABNORMAL
RBC MORPH BLD: ABNORMAL
ROULEAUX BLD QL SMEAR: ABNORMAL
SICKLE CELLS BLD QL SMEAR: ABNORMAL
SMUDGE CELLS BLD QL SMEAR: PRESENT
SODIUM SERPL-SCNC: 138 MMOL/L (ref 135–145)
SPHEROCYTES BLD QL SMEAR: ABNORMAL
STOMATOCYTES BLD QL SMEAR: ABNORMAL
TARGETS BLD QL SMEAR: ABNORMAL
TOXIC GRANULES BLD QL SMEAR: ABNORMAL
VARIANT LYMPHS BLD QL SMEAR: ABNORMAL
WBC # BLD AUTO: 15 10E3/UL (ref 4–11)

## 2024-06-26 PROCEDURE — 85025 COMPLETE CBC W/AUTO DIFF WBC: CPT

## 2024-06-26 PROCEDURE — 83615 LACTATE (LD) (LDH) ENZYME: CPT

## 2024-06-26 PROCEDURE — 36415 COLL VENOUS BLD VENIPUNCTURE: CPT

## 2024-06-26 PROCEDURE — 80053 COMPREHEN METABOLIC PANEL: CPT

## 2024-07-03 NOTE — PROGRESS NOTES
Hematology Follow-up Visit    Reason for Visit:  Branden is a 53 year old gentleman with a diagnosis of CLL, who presents to the clinic today for routine follow-up.    Nursing Note and documentation reviewed: Yes    Interval History:   Branden notes that he doesn't have any really concerns today. Things appear stable. He still has a stable left posterior cervical node. Doesn't feel like this has enlarged at all. He did not follow-up with imaging as suggested and ordered in March. He also continue to endorse a right breast lump. No other new concerns.     He denies recent or recurrent infections. No fever, chills, chest pain, cough, or SOB. No night sweats. No bleeding concerns. Appetite is good. Weight up about 7 lbs from this time last year. No bowel concerns. Energy alright. Stays busy with work and gets tired at end of the day. No abdominal pain. No early satiety.     History of Present Illness:     For full history, please see prior documentation of Dr. Erickson.     In short, the patient returns for followup of stage 0 chronic lymphocytic leukemia with history of lung nodules. He had seen the patient in consultation at the request of Dr. Vanessa Hastings on 09/24/2020. At that time, the patient was a 50-year-old white male with a history of tobacco abuse. Dr. Erickson was asked to evaluate concerning diagnosis of lymphocytosis and pulmonary nodules. He presented to the emergency room with right sided abdominal pain, was found to have ureteral stone with mild hydronephrosis. The patient was given IV fluids.     At that time, a CBC was drawn revealed a white count of 17.5 with H and H 15.6, 42.3, platelet count 241,000. Peripheral blood smear was ordered and was read as mild lymphocytosis, which could be associated with CLL or possibly well-differentiated lymphocytic lymphoma. The patient also had a CT chest done on 12/05/2020 which was read as multiple small noncalcified pulmonary nodules. It was mainly in the right  and left lower lobes, all measured less than 6 mm. The patient did have a history of tobacco abuse in the past and quit 13 years ago. He was in [place] and was a war vet there and was in the [employer].     When we saw the patient, we wanted to rule out CLL. We obtained peripheral blood for flow cytometry. This revealed the patient had 37% CD5 positive kappa monotypic cells. This was all consistent with CLL. We ordered a PET scan, was essentially negative. Lung nodules seen on previous CT were not PET avid. There was no lymphadenopathy or splenomegaly. When we saw the patient on 2021, we recommended a FISH panel to rule out 17p deletion. The patient was found to be 17p deletion negative; therefore, the prognosis was excellent and the plan was to watch and wait. He had a repeat CT chest on 2021 which was read as stable findings.     He has been followed in surveillance.     Current Treatment: None    Past Medical History:   Diagnosis Date    Arthritis     Cancer (H)     GERD (gastroesophageal reflux disease)     2016 - EGD without barretts changes.       Social History     Socioeconomic History    Marital status:      Spouse name: Maryam    Number of children: 6    Years of education: Not on file    Highest education level: Not on file   Occupational History    Not on file   Tobacco Use    Smoking status: Former     Current packs/day: 0.00     Types: Cigarettes     Quit date: 2006     Years since quittin.5    Smokeless tobacco: Never   Vaping Use    Vaping status: Never Used   Substance and Sexual Activity    Alcohol use: Yes     Comment: a case a year at the most.     Drug use: Never    Sexual activity: Yes     Partners: Female   Other Topics Concern    Parent/sibling w/ CABG, MI or angioplasty before 65F 55M? Not Asked   Social History Narrative    Preloaded 13     Social Determinants of Health     Financial Resource Strain: Low Risk  (2023)    Financial Resource Strain     Within  the past 12 months, have you or your family members you live with been unable to get utilities (heat, electricity) when it was really needed?: No   Food Insecurity: High Risk (12/27/2023)    Food Insecurity     Within the past 12 months, did you worry that your food would run out before you got money to buy more?: No     Within the past 12 months, did the food you bought just not last and you didn t have money to get more?: Yes   Transportation Needs: Low Risk  (12/27/2023)    Transportation Needs     Within the past 12 months, has lack of transportation kept you from medical appointments, getting your medicines, non-medical meetings or appointments, work, or from getting things that you need?: No   Physical Activity: Not on file   Stress: Not on file   Social Connections: Not on file   Interpersonal Safety: Low Risk  (12/27/2023)    Interpersonal Safety     Do you feel physically and emotionally safe where you currently live?: Yes     Within the past 12 months, have you been hit, slapped, kicked or otherwise physically hurt by someone?: No     Within the past 12 months, have you been humiliated or emotionally abused in other ways by your partner or ex-partner?: No   Housing Stability: Low Risk  (12/27/2023)    Housing Stability     Do you have housing? : Yes     Are you worried about losing your housing?: No       History reviewed. No pertinent surgical history.    Family History   Problem Relation Age of Onset    Obesity Mother     Cerebrovascular Disease Mother     Diabetes No family hx of     Coronary Artery Disease No family hx of     Hypertension No family hx of     Hyperlipidemia No family hx of     Breast Cancer No family hx of     Colon Cancer No family hx of     Prostate Cancer No family hx of     Anesthesia Reaction No family hx of     Asthma No family hx of     Genetic Disorder No family hx of     Thyroid Disease No family hx of        Allergies:  Allergies as of 07/08/2024    (No Known Allergies)  "      Current Medications:  Current Outpatient Medications   Medication Sig Dispense Refill    ibuprofen (ADVIL/MOTRIN) 200 MG tablet Take 200 mg by mouth every 4 hours as needed for mild pain      omeprazole (PRILOSEC) 20 MG DR capsule Take 1 capsule (20 mg) by mouth daily 90 capsule 3        Review Of Systems:  A complete review of systems is negative except for the above mentioned items in the interval history.     Physical Exam:  /72 (BP Location: Right arm, Patient Position: Sitting, Cuff Size: Adult Large)   Pulse 79   Temp 98.1  F (36.7  C) (Tympanic)   Ht 1.753 m (5' 9\")   Wt 102.2 kg (225 lb 5 oz)   SpO2 97%   BMI 33.27 kg/m    GENERAL APPEARANCE: Healthy, alert and in no acute distress.  HEENT: Eyes appear normal without scleral icterus. Extraocular movements intact.   NECK: Supple with normal range of motion. No asymmetry or masses.  LYMPHATICS: One firm node the size of eraser posterior left neck, 3-4 mm in size. No other palpable adenopathy.   RESP: Lungs clear to auscultation bilaterally, respirations regular and easy.  CARDIOVASCULAR: Regular rate and rhythm. Normal S1, S2; no murmur, gallop, or rub.  ABDOMEN: Soft, non-tender, non-distended. Bowel sounds auscultated all 4 quadrants. No palpable organomegaly or masses.  BREAST: Right breast has a 1x2 mm linear bump on the most anterior portion of areola, no underlying masses.   MUSCULOSKELETAL: Extremities without gross deformities noted.   NEURO: Alert and oriented x 3.  Gait steady.  PSYCHIATRIC: Mentation and affect appear normal.  Mood appropriate.    Laboratory/Imaging Studies:  Component      Latest Ref Rn 6/26/2024  3:20 PM   RBC Morphology Confirmed RBC Indices    Platelet Morphology      Automated Count Confirmed. Platelet morphology is normal.  Automated Count Confirmed. Platelet morphology is normal.    Bite Cells      None Seen  Slight !    H[place]iland Cells      None Seen  Slight !    Elliptocytes      None Seen  Slight !    Smudge " Cells      None Seen  Present !    WBC      4.0 - 11.0 10e3/uL 15.0 (H)    RBC Count      4.40 - 5.90 10e6/uL 5.01    Hemoglobin      13.3 - 17.7 g/dL 15.0    Hematocrit      40.0 - 53.0 % 44.3    MCV      78 - 100 fL 88    MCH      26.5 - 33.0 pg 29.9    MCHC      31.5 - 36.5 g/dL 33.9    RDW      10.0 - 15.0 % 14.2    Platelet Count      150 - 450 10e3/uL 180    % Neutrophils      % 26    % Lymphocytes      % 66    % Monocytes      % 6    % Eosinophils      % 1    % Basophils      % 0    % Immature Granulocytes      % 0    NRBCs per 100 WBC      <1 /100 1 (H)    Absolute Neutrophils      1.6 - 8.3 10e3/uL 3.9    Absolute Lymphocytes      0.8 - 5.3 10e3/uL 10.0 (H)    Absolute Monocytes      0.0 - 1.3 10e3/uL 0.8    Absolute Eosinophils      0.0 - 0.7 10e3/uL 0.2    Absolute Basophils      0.0 - 0.2 10e3/uL 0.1    Absolute Immature Granulocytes      <=0.4 10e3/uL 0.0    Absolute NRBCs      10e3/uL 0.1    Sodium      135 - 145 mmol/L 138    Potassium      3.4 - 5.3 mmol/L 4.2    Carbon Dioxide (CO2)      22 - 29 mmol/L 24    Anion Gap      7 - 15 mmol/L 9    Urea Nitrogen      6.0 - 20.0 mg/dL 14.2    Creatinine      0.67 - 1.17 mg/dL 0.85    GFR Estimate      >60 mL/min/1.73m2 >90    Calcium      8.6 - 10.0 mg/dL 8.9    Chloride      98 - 107 mmol/L 105    Glucose      70 - 99 mg/dL 107 (H)    Alkaline Phosphatase      40 - 150 U/L 61    AST      0 - 45 U/L 25    ALT      0 - 70 U/L 38    Protein Total      6.4 - 8.3 g/dL 6.6    Albumin      3.5 - 5.2 g/dL 4.1    Bilirubin Total      <=1.2 mg/dL 0.7    Lactate Dehydrogenase      0 - 250 U/L 187       Legend:  ! Abnormal  (H) High      ASSESSMENT/PLAN:    1.  Stage 0 chronic lymphocytic leukemia Has been followed in surveillance. Buitrago Stage 0. Things appear stable. He does have one posterior cervical node on left which appears stable but never completed follow-up ultrasound. Will reorder today and notify of results. In addition, his CBCD did show both bite and sabi  cells. Reviewed findings with Dr. Erickson who advised I order a peripheral smear, so that was ordered today as well. ALL is actually improved. We will continue with surveillance and see him back in 6 months with labs prior.     #2 Hx pulmonary nodes Most recent scan 2022. Hx smoking, hx exposure in [place]. Will update with CT scan without contrast and notify him with results.     #3 Skin concern Patient does have a 1x2 mm skin lesion along areola. No underlying mass. Will defer on mammogram and ultrasound as this is more of a skin abnormality vs breast.     Patient in agreement with plan and verbalizes understanding. Agrees to call with any questions or concerns.    35 minutes spent in the patient's encounter today with time spent in review of patient's chart along with chart preparation and review of the treatment plan and signing of treatment plan.  Time was also spent with the patient in obtaining a review of systems and performing a physical exam along with detailed review of all test results.  Time was also spent in discussing plan for future follow-up and relating instructions for follow-up and in placing future orders.    Sujey Abdullahi, NATALIYA, FNP-BC

## 2024-07-08 ENCOUNTER — ONCOLOGY VISIT (OUTPATIENT)
Dept: ONCOLOGY | Facility: OTHER | Age: 54
End: 2024-07-08
Attending: NURSE PRACTITIONER
Payer: COMMERCIAL

## 2024-07-08 VITALS
WEIGHT: 225.31 LBS | HEART RATE: 79 BPM | SYSTOLIC BLOOD PRESSURE: 126 MMHG | HEIGHT: 69 IN | DIASTOLIC BLOOD PRESSURE: 72 MMHG | OXYGEN SATURATION: 97 % | BODY MASS INDEX: 33.37 KG/M2 | TEMPERATURE: 98.1 F

## 2024-07-08 DIAGNOSIS — R91.8 PULMONARY NODULES: ICD-10-CM

## 2024-07-08 DIAGNOSIS — R59.1 LYMPHADENOPATHY: ICD-10-CM

## 2024-07-08 DIAGNOSIS — C91.10 CLL (CHRONIC LYMPHOCYTIC LEUKEMIA) (H): Primary | ICD-10-CM

## 2024-07-08 DIAGNOSIS — L98.9 SKIN ABNORMALITY: ICD-10-CM

## 2024-07-08 LAB
RETICS # AUTO: 0.06 10E6/UL (ref 0.03–0.1)
RETICS/RBC NFR AUTO: 1.1 % (ref 0.5–2)

## 2024-07-08 PROCEDURE — 36415 COLL VENOUS BLD VENIPUNCTURE: CPT | Performed by: NURSE PRACTITIONER

## 2024-07-08 PROCEDURE — 85060 BLOOD SMEAR INTERPRETATION: CPT | Performed by: PATHOLOGY

## 2024-07-08 PROCEDURE — 85045 AUTOMATED RETICULOCYTE COUNT: CPT | Performed by: NURSE PRACTITIONER

## 2024-07-08 PROCEDURE — 85025 COMPLETE CBC W/AUTO DIFF WBC: CPT | Performed by: NURSE PRACTITIONER

## 2024-07-08 PROCEDURE — 99214 OFFICE O/P EST MOD 30 MIN: CPT | Performed by: NURSE PRACTITIONER

## 2024-07-08 ASSESSMENT — PAIN SCALES - GENERAL: PAINLEVEL: NO PAIN (0)

## 2024-07-08 NOTE — NURSING NOTE
"Oncology Rooming Note    July 8, 2024 3:21 PM   Branden Becker is a 53 year old male who presents for:    Chief Complaint   Patient presents with    Oncology Clinic Visit     Follow up. CLL (chronic lymphocytic leukemia)      Initial Vitals: /72 (BP Location: Right arm, Patient Position: Sitting, Cuff Size: Adult Large)   Pulse 79   Temp 98.1  F (36.7  C) (Tympanic)   Ht 1.753 m (5' 9\")   Wt 102.2 kg (225 lb 5 oz)   SpO2 97%   BMI 33.27 kg/m   Estimated body mass index is 33.27 kg/m  as calculated from the following:    Height as of this encounter: 1.753 m (5' 9\").    Weight as of this encounter: 102.2 kg (225 lb 5 oz). Body surface area is 2.23 meters squared.  No Pain (0) Comment: Data Unavailable   No LMP for male patient.  Allergies reviewed: Yes  Medications reviewed: Yes    Medications: Medication refills not needed today.  Pharmacy name entered into [facility]:    [facility] - [place], [place] - [address] [address]  [facility] - [place], [place] - [address] [facility] - [place], [place] - [address] [facility]    Frailty Screening:   Is the patient here for a new oncology consult visit in cancer care? 2. No      Clinical concerns: none       Adeline Rivera LPN                "

## 2024-07-10 LAB
BASOPHILS # BLD AUTO: 0 10E3/UL (ref 0–0.2)
BASOPHILS NFR BLD AUTO: 0 %
EOSINOPHIL # BLD AUTO: 0.2 10E3/UL (ref 0–0.7)
EOSINOPHIL NFR BLD AUTO: 1 %
ERYTHROCYTE [DISTWIDTH] IN BLOOD BY AUTOMATED COUNT: 14 % (ref 10–15)
HCT VFR BLD AUTO: 45.1 % (ref 40–53)
HGB BLD-MCNC: 15.8 G/DL (ref 13.3–17.7)
IMM GRANULOCYTES # BLD: 0 10E3/UL
IMM GRANULOCYTES NFR BLD: 0 %
LYMPHOCYTES # BLD AUTO: 10.5 10E3/UL (ref 0.8–5.3)
LYMPHOCYTES NFR BLD AUTO: 70 %
MCH RBC QN AUTO: 30.7 PG (ref 26.5–33)
MCHC RBC AUTO-ENTMCNC: 35 G/DL (ref 31.5–36.5)
MCV RBC AUTO: 88 FL (ref 78–100)
MONOCYTES # BLD AUTO: 0.8 10E3/UL (ref 0–1.3)
MONOCYTES NFR BLD AUTO: 5 %
NEUTROPHILS # BLD AUTO: 3.5 10E3/UL (ref 1.6–8.3)
NEUTROPHILS NFR BLD AUTO: 23 %
NRBC # BLD AUTO: 0 10E3/UL
NRBC BLD AUTO-RTO: 0 /100
PATH REPORT.COMMENTS IMP SPEC: ABNORMAL
PATH REPORT.COMMENTS IMP SPEC: ABNORMAL
PATH REPORT.COMMENTS IMP SPEC: YES
PATH REPORT.FINAL DX SPEC: ABNORMAL
PATH REPORT.MICROSCOPIC SPEC OTHER STN: ABNORMAL
PATH REPORT.RELEVANT HX SPEC: ABNORMAL
PLAT MORPH BLD: ABNORMAL
PLATELET # BLD AUTO: 185 10E3/UL (ref 150–450)
RBC # BLD AUTO: 5.15 10E6/UL (ref 4.4–5.9)
RBC MORPH BLD: ABNORMAL
SMUDGE CELLS BLD QL SMEAR: PRESENT
WBC # BLD AUTO: 15 10E3/UL (ref 4–11)

## 2024-07-12 ENCOUNTER — HOSPITAL ENCOUNTER (OUTPATIENT)
Dept: CT IMAGING | Facility: HOSPITAL | Age: 54
Discharge: HOME | End: 2024-07-12
Attending: NURSE PRACTITIONER
Payer: COMMERCIAL

## 2024-07-12 ENCOUNTER — HOSPITAL ENCOUNTER (OUTPATIENT)
Dept: ULTRASOUND IMAGING | Facility: HOSPITAL | Age: 54
Discharge: HOME | End: 2024-07-12
Attending: NURSE PRACTITIONER
Payer: COMMERCIAL

## 2024-07-12 DIAGNOSIS — R91.8 PULMONARY NODULES: ICD-10-CM

## 2024-07-12 DIAGNOSIS — R59.1 LYMPHADENOPATHY: ICD-10-CM

## 2024-07-12 DIAGNOSIS — C91.10 CLL (CHRONIC LYMPHOCYTIC LEUKEMIA) (H): ICD-10-CM

## 2024-07-12 PROCEDURE — 71250 CT THORAX DX C-: CPT

## 2024-07-12 PROCEDURE — 76536 US EXAM OF HEAD AND NECK: CPT

## 2024-09-09 ENCOUNTER — OFFICE VISIT (OUTPATIENT)
Dept: SURGERY | Facility: OTHER | Age: 54
End: 2024-09-09
Attending: NURSE PRACTITIONER
Payer: COMMERCIAL

## 2024-09-09 ENCOUNTER — PREP FOR PROCEDURE (OUTPATIENT)
Dept: SURGERY | Facility: OTHER | Age: 54
End: 2024-09-09

## 2024-09-09 VITALS
HEIGHT: 69 IN | WEIGHT: 225 LBS | DIASTOLIC BLOOD PRESSURE: 80 MMHG | OXYGEN SATURATION: 98 % | TEMPERATURE: 98.2 F | HEART RATE: 77 BPM | RESPIRATION RATE: 14 BRPM | SYSTOLIC BLOOD PRESSURE: 122 MMHG | BODY MASS INDEX: 33.33 KG/M2

## 2024-09-09 DIAGNOSIS — Z12.11 COLON CANCER SCREENING: Primary | ICD-10-CM

## 2024-09-09 DIAGNOSIS — R09.A2 GLOBUS SENSATION: Primary | ICD-10-CM

## 2024-09-09 DIAGNOSIS — R09.A2 GLOBUS SENSATION: ICD-10-CM

## 2024-09-09 DIAGNOSIS — Z12.11 COLON CANCER SCREENING: ICD-10-CM

## 2024-09-09 PROCEDURE — 99213 OFFICE O/P EST LOW 20 MIN: CPT | Performed by: NURSE PRACTITIONER

## 2024-09-09 RX ORDER — BISACODYL 5 MG/1
TABLET, DELAYED RELEASE ORAL
Qty: 4 TABLET | Refills: 0 | Status: ON HOLD | OUTPATIENT
Start: 2024-09-09 | End: 2024-09-11

## 2024-09-09 ASSESSMENT — PAIN SCALES - GENERAL: PAINLEVEL: NO PAIN (0)

## 2024-09-09 NOTE — PATIENT INSTRUCTIONS
Thank you for allowing Tess Croft NP and our surgical team to participate in your care. Please call our health unit coordinator at [phone] with scheduling questions or the nurse at [phone] with any other questions or concerns.      You have been scheduled for: COLONOSCOPY AND UPPER ENDOSCOPY with Dr. Arjun Ackerman on WEDNESDAY SEPTEMBER 11TH.     You will be notified the day before the procedure for your check in time, you can also call admitting at  after 5:00PM    You will need to have and adult  to bring you home.    SURGERY EDUCATION NURSE TO CALL ONE WEEK PRIOR TO PROCEDURE, IF YOU DO NOT HEAR FROM THEM YOU CAN CALL  [phone]    You will use GOL[employer]ELY bowel prep.    Please see handout for additional instruction.    You WILL NOT need a pre-operative appointment with your primary care provider.    You may call [phone] or [phone] with any questions.

## 2024-09-09 NOTE — PROGRESS NOTES
CLINIC NOTE - CONSULT  9/9/2024    Patient : Branden Becker    Reason for Referral : Upper and lower endoscopy    This is a 53 year old male with a need for an upper and lower endoscopy.  Upper endoscopy is needed for  globus sensation which is significant in nature and worsening--swallowing is making the globus sensation worse .  Lower endoscopy is needed for Screening colonoscopy.      Last EGD : 2016  History of GERD : YES  History of PUD : NO  On PPI's : YES   Drug and Dose : prilosec    Last colonoscopy : never  Family history of colon cancer : NO  Family history of colon polyps : NO  Personal history of colon cancer : NO  Personal history of colon polyps : NO  Rectal bleeding :NO  Changes in bowel habits :NO  Personal history of inflammatory bowel disease : NO    Past Medical History:  Past Medical History:   Diagnosis Date    Arthritis     Cancer (H)     GERD (gastroesophageal reflux disease)     2016 - EGD without barretts changes.       Past Surgical History:  No past surgical history on file.    Family History History:  Family History   Problem Relation Age of Onset    Obesity Mother     Cerebrovascular Disease Mother     Diabetes No family hx of     Coronary Artery Disease No family hx of     Hypertension No family hx of     Hyperlipidemia No family hx of     Breast Cancer No family hx of     Colon Cancer No family hx of     Prostate Cancer No family hx of     Anesthesia Reaction No family hx of     Asthma No family hx of     Genetic Disorder No family hx of     Thyroid Disease No family hx of        History of Tobacco Use:  History   Smoking Status    Former    Types: Cigarettes   Smokeless Tobacco    Never       Current Medications:  Current Outpatient Medications   Medication Sig Dispense Refill    ibuprofen (ADVIL/MOTRIN) 200 MG tablet Take 200 mg by mouth every 4 hours as needed for mild pain      omeprazole (PRILOSEC) 20 MG DR capsule Take 1 capsule (20 mg) by mouth daily 90 capsule 3  "      Allergies:  No Known Allergies    ROS:  Constitutional: negative  Eyes: negative  Ears, nose, mouth, throat, and face: negative  Respiratory: negative  Cardiovascular: negative  Gastrointestinal: positive for globus sensation  Genitourinary:negative  Integument/breast: negative  Hematologic/lymphatic: positive for CLL  Musculoskeletal: negative  Neurological: negative  Behavioral/Psych: negative  Endocrine: negative  Allergic/Immunologic: negative    PHYSICAL EXAM:     Vital signs: /80 (BP Location: Right arm, Cuff Size: Adult Regular)   Pulse 77   Temp 98.2  F (36.8  C) (Tympanic)   Resp 14   Ht 1.753 m (5' 9\")   Wt 102.1 kg (225 lb)   SpO2 98%   BMI 33.23 kg/m     BMI: Body mass index is 33.23 kg/m .   General: Normal, healthy, cooperative, in no acute distress, alert   Skin: no rashes   Lungs: clear to auscultation   CV: Regular rate and rhythm    Abdominal: non-distended, soft, non-tender to palpation   Extremities: No cyanosis, clubbing or edema noted bilaterally in Upper and Lower Extremities   Neurological: without deficit    Assessment:   53 year old male with need for upper endoscopy for  globus sensation  and lower endoscopy for Screening colonoscopy:    Plan:   Will schedule an esophagogastroduodenoscopy and colonoscopy.  The procedures with their risks, benefits and alternatives were explained.  Risks include but are not limited to bleeding, perforation, missing lesions, need for additional procedures, reaction to anesthesia.  All the patients questions were answered.  The patient consents to proceed.  The procedures will be scheduled.     "

## 2024-09-10 ENCOUNTER — ANESTHESIA EVENT (OUTPATIENT)
Dept: SURGERY | Facility: HOSPITAL | Age: 54
End: 2024-09-10
Payer: COMMERCIAL

## 2024-09-10 RX ORDER — DIPHENHYDRAMINE HCL 50 MG
50 CAPSULE ORAL EVERY 6 HOURS PRN
COMMUNITY

## 2024-09-10 ASSESSMENT — LIFESTYLE VARIABLES: TOBACCO_USE: 1

## 2024-09-10 NOTE — DISCHARGE INSTRUCTIONS
After Anesthesia (Sleep Medicine)  What should I do after anesthesia?  You should rest and relax for the next 24 hours. Avoid risky or difficult (strenuous) activity. A responsible adult should stay with you overnight.  Don't drive or use any heavy equipment for 24 hours. Even if you feel normal, your reactions may be affected by the sleep medicine given to you.  Don't drink alcohol or make any important decisions for 24 hours.  Slowly get back to your regular diet, as you feel able.  How should I expect to feel?  It's normal to feel dizzy, light-headed, or faint for up to a full day after anesthesia or while taking pain medicine. If this happens:   Sit down for a few minutes before standing.  Have someone help you when you get up to walk or use the bathroom.  If you have nausea (feel sick to your stomach) or vomit (throw up):   Drink clear liquids (such as apple juice, ginger ale, broth, or 7UP) until you feel better.  If you feel sick to your stomach, or you keep vomiting for 24 hours, please call the doctor.  What else should I know?  You might have a dry mouth, sore throat, muscle aches, or trouble sleeping. These should go away after 24 hours.  Please contact your doctor if you have any other symptoms that concern you, such as fever, pain, bleeding, fluid drainage, swelling, or headache, or if it's been over 8 to 10 hours and you still aren't able to pee (urinate).  If you have a history of sleep apnea, it's very important to use your CPAP machine for the next 24 hours when you nap or sleep.   For informational purposes only. Not to replace the advice of your health care provider. Copyright   2023 [email or web address]. All rights reserved. Clinically reviewed by Grant Bryant MD. [employer] [id number] - REV 09/23.  Colonoscopy: What to Expect at Home  Your Recovery  After a colonoscopy, you'll stay at the clinic until you wake up. Then you can go home. But you'll need to arrange for a ride. Your doctor will  tell you when you can eat and do your other usual activities.  Your doctor will talk to you about when you'll need your next colonoscopy. Your doctor can help you decide how often you need to be checked. This will depend on the results of your test and your risk for colorectal cancer.  After the test, you may be bloated or have gas pains. You may need to pass gas. If a biopsy was done or a polyp was removed, you may have streaks of blood in your stool (feces) for a few days. Problems such as heavy rectal bleeding may not occur until several weeks after the test. This isn't common. But it can happen after polyps are removed.  This care sheet gives you a general idea about how long it will take for you to recover. But each person recovers at a different pace. Follow the steps below to get better as quickly as possible.  How can you care for yourself at home?  Activity    Rest when you feel tired.     You can do your normal activities when it feels okay to do so.   Diet    Follow your doctor's directions for eating.     Unless your doctor has told you not to, drink plenty of fluids. This helps to replace the fluids that were lost during the colon prep.     Do not drink alcohol.   Medicines    Your doctor will tell you if and when you can restart your medicines. You will also be given instructions about taking any new medicines.     If you stopped taking aspirin or some other blood thinner, your doctor will tell you when to start taking it again.     If polyps were removed or a biopsy was done during the test, your doctor may tell you not to take aspirin or other anti-inflammatory medicines for a few days. These include ibuprofen (Advil, Motrin) and naproxen (Aleve).   Other instructions    For your safety, do not drive or operate machinery until the medicine wears off and you can think clearly. Your doctor may tell you not to drive or operate machinery until the day after your test.     Do not sign legal documents or  "make major decisions until the medicine wears off and you can think clearly. The anesthesia can make it hard for you to fully understand what you are agreeing to.   Follow-up care is a key part of your treatment and safety. Be sure to make and go to all appointments, and call your doctor if you are having problems. It's also a good idea to know your test results and keep a list of the medicines you take.  When should you call for help?   Call 911 anytime you think you may need emergency care. For example, call if:    You passed out (lost consciousness).     You pass maroon or bloody stools.     You have trouble breathing.   Call your doctor now or seek immediate medical care if:    You have pain that does not get better after you take pain medicine.     You are sick to your stomach or cannot drink fluids.     You have new or worse belly pain.     You have blood in your stools.     You have a fever.     You cannot pass stools or gas.   Watch closely for changes in your health, and be sure to contact your doctor if you have any problems.  Where can you learn more?  Go to https://www.[employer].net/patiented  Enter E264 in the search box to learn more about \"Colonoscopy: What to Expect at Home.\"  Current as of: October 25, 2023               Content Version: 14.0    3189-8718 [employer].   Care instructions adapted under license by your healthcare professional. If you have questions about a medical condition or this instruction, always ask your healthcare professional. [employer] disclaims any warranty or liability for your use of this information.  Upper GI Endoscopy: What to Expect at Home  Your Recovery  You had an upper GI endoscopy. Your doctor used a thin, lighted tube that bends to look at the inside of your esophagus, your stomach, and the first part of the small intestine, called the duodenum.  After you have an endoscopy, you will stay at the hospital or clinic for 1 to 2 hours. " "This will allow the medicine to wear off. You will be able to go home after your doctor or nurse checks to make sure that you're not having any problems.  You may have to stay overnight if you had treatment during the test. You may have a sore throat for a day or two after the test.  This care sheet gives you a general idea about what to expect after the test.  How can you care for yourself at home?  Activity   Rest as much as you need to after you go home.  You should be able to go back to your usual activities the day after the test.  Diet   Follow your doctor's directions for eating after the test.  Drink plenty of fluids (unless your doctor has told you not to).  Medications   If you have a sore throat the day after the test, use an over-the-counter spray to numb your throat.  Follow-up care is a key part of your treatment and safety. Be sure to make and go to all appointments, and call your doctor if you are having problems. It's also a good idea to know your test results and keep a list of the medicines you take.  When should you call for help?   Call 911 anytime you think you may need emergency care. For example, call if:    You passed out (lost consciousness).     You have trouble breathing.     You pass maroon or bloody stools.   Call your doctor now or seek immediate medical care if:    You have pain that does not get better after your take pain medicine.     You have new or worse belly pain.     You have blood in your stools.     You are sick to your stomach and cannot keep fluids down.     You have a fever.     You cannot pass stools or gas.   Watch closely for changes in your health, and be sure to contact your doctor if:    Your throat still hurts after a day or two.     You do not get better as expected.   Where can you learn more?  Go to https://www.healthwise.net/patiented  Enter J454 in the search box to learn more about \"Upper GI Endoscopy: What to Expect at Home.\"  Current as of: October 19, 2023 "               Content Version: 14.0    5798-4846 [employer].   Care instructions adapted under license by your healthcare professional. If you have questions about a medical condition or this instruction, always ask your healthcare professional. [employer] disclaims any warranty or liability for your use of this information.

## 2024-09-10 NOTE — ANESTHESIA PREPROCEDURE EVALUATION
Anesthesia Pre-Procedure Evaluation    Patient: Branden Becker   MRN: [id number] : 1970        Procedure : Procedure(s):  COLONOSCOPY, WITH UPPER GASTROINTESTINAL TRACT ENDOSCOPY WITH POSSIBLE BALLOON DILATION          Past Medical History:   Diagnosis Date     Arthritis      Cancer (H)      GERD (gastroesophageal reflux disease)     2016 - EGD without barretts changes.      No past surgical history on file.   No Known Allergies   Social History     Tobacco Use     Smoking status: Former     Current packs/day: 0.00     Types: Cigarettes     Quit date: 2006     Years since quittin.7     Smokeless tobacco: Never   Substance Use Topics     Alcohol use: Yes     Comment: a case a year at the most.       Wt Readings from Last 1 Encounters:   24 102.1 kg (225 lb)        Anesthesia Evaluation   Pt has had prior anesthetic. Type: MAC.        ROS/MED HX  ENT/Pulmonary: Comment: Pulmonary nodules    (+)                tobacco use, Past use,                       Neurologic:  - neg neurologic ROS     Cardiovascular:       METS/Exercise Tolerance: >4 METS    Hematologic:  - neg hematologic  ROS     Musculoskeletal:  - neg musculoskeletal ROS     GI/Hepatic: Comment: Globulus sensation    (+) GERD, Asymptomatic on medication,      bowel prep,            Renal/Genitourinary:  - neg Renal ROS     Endo:     (+)               Obesity,       Psychiatric/Substance Use:  - neg psychiatric ROS     Infectious Disease:  - neg infectious disease ROS     Malignancy: Comment: CLL carrier has not needed any treatments yet and gets checked every 6 months   (+) Malignancy, History of Lymphoma/Leukemia.    Other:            Physical Exam    Airway        Mallampati: IV   TM distance: > 3 FB   Neck ROM: full   Mouth opening: > 3 cm    Respiratory Devices and Support         Dental       (+) Modest Abnormalities - crowns, retainers, 1 or 2 missing teeth      Cardiovascular   cardiovascular exam normal          Pulmonary    "pulmonary exam normal            OUTSIDE LABS:  CBC:   Lab Results   Component Value Date    WBC 15.0 (H) 07/08/2024    WBC 15.0 (H) 06/26/2024    HGB 15.8 07/08/2024    HGB 15.0 06/26/2024    HCT 45.1 07/08/2024    HCT 44.3 06/26/2024     07/08/2024     06/26/2024     BMP:   Lab Results   Component Value Date     06/26/2024     12/27/2023    POTASSIUM 4.2 06/26/2024    POTASSIUM 4.0 12/27/2023    CHLORIDE 105 06/26/2024    CHLORIDE 104 12/27/2023    CO2 24 06/26/2024    CO2 23 12/27/2023    BUN 14.2 06/26/2024    BUN 14.8 12/27/2023    CR 0.85 06/26/2024    CR 0.82 12/27/2023     (H) 06/26/2024     (H) 12/27/2023     COAGS: No results found for: \"PTT\", \"INR\", \"FIBR\"  POC: No results found for: \"BGM\", \"HCG\", \"HCGS\"  HEPATIC:   Lab Results   Component Value Date    ALBUMIN 4.1 06/26/2024    PROTTOTAL 6.6 06/26/2024    ALT 38 06/26/2024    AST 25 06/26/2024    ALKPHOS 61 06/26/2024    BILITOTAL 0.7 06/26/2024     OTHER:   Lab Results   Component Value Date    LACT 1.7 11/26/2020    A1C 5.8 (H) 12/27/2023    BRENDON 8.9 06/26/2024    CRP <2.9 11/26/2020    SED 5 11/26/2020       Anesthesia Plan    ASA Status:  2    NPO Status:  NPO Appropriate    Anesthesia Type: MAC.     - Reason for MAC: straight local not clinically adequate   Induction: Intravenous, Propofol.   Maintenance: Balanced.        Consents    Anesthesia Plan(s) and associated risks, benefits, and realistic alternatives discussed. Questions answered and patient/representative(s) expressed understanding.     - Discussed: Risks, Benefits and Alternatives for BOTH SEDATION and the PROCEDURE were discussed     - Discussed with:  Patient      - Extended Intubation/Ventilatory Support Discussed: No.      - Patient is DNR/DNI Status: No     Use of blood products discussed: No .     Postoperative Care            Comments:    Other Comments: Risks and benefits of MAC anesthetic discussed including dental damage, aspiration, loss " "of airway, conversion to general anesthetic, CV complications, MI, stroke, death. Pt wishes to proceed. Saw Tess 9/9/24           NATALIYA Higginbotham CRNA    I have reviewed the pertinent notes and labs in the chart from the past 30 days and (re)examined the patient.  Any updates or changes from those notes are reflected in this note.              # Obesity: Estimated body mass index is 33.23 kg/m  as calculated from the following:    Height as of 9/9/24: 1.753 m (5' 9\").    Weight as of 9/9/24: 102.1 kg (225 lb).      "

## 2024-09-11 ENCOUNTER — ANESTHESIA (OUTPATIENT)
Dept: SURGERY | Facility: HOSPITAL | Age: 54
End: 2024-09-11
Payer: COMMERCIAL

## 2024-09-11 ENCOUNTER — HOSPITAL ENCOUNTER (OUTPATIENT)
Facility: HOSPITAL | Age: 54
Discharge: HOME | End: 2024-09-11
Attending: SURGERY | Admitting: SURGERY
Payer: COMMERCIAL

## 2024-09-11 VITALS
RESPIRATION RATE: 16 BRPM | SYSTOLIC BLOOD PRESSURE: 104 MMHG | BODY MASS INDEX: 30.51 KG/M2 | HEART RATE: 69 BPM | TEMPERATURE: 97.8 F | WEIGHT: 206 LBS | DIASTOLIC BLOOD PRESSURE: 61 MMHG | HEIGHT: 69 IN | OXYGEN SATURATION: 95 %

## 2024-09-11 DIAGNOSIS — K21.9 GASTROESOPHAGEAL REFLUX DISEASE WITHOUT ESOPHAGITIS: Primary | ICD-10-CM

## 2024-09-11 PROCEDURE — 710N000012 HC RECOVERY PHASE 2, PER MINUTE: Performed by: SURGERY

## 2024-09-11 PROCEDURE — G0121 COLON CA SCRN NOT HI RSK IND: HCPCS | Performed by: SURGERY

## 2024-09-11 PROCEDURE — 370N000017 HC ANESTHESIA TECHNICAL FEE, PER MIN: Performed by: SURGERY

## 2024-09-11 PROCEDURE — 43239 EGD BIOPSY SINGLE/MULTIPLE: CPT | Performed by: NURSE ANESTHETIST, CERTIFIED REGISTERED

## 2024-09-11 PROCEDURE — 88305 TISSUE EXAM BY PATHOLOGIST: CPT | Mod: 26 | Performed by: PATHOLOGY

## 2024-09-11 PROCEDURE — 272N000001 HC OR GENERAL SUPPLY STERILE: Performed by: SURGERY

## 2024-09-11 PROCEDURE — 250N000011 HC RX IP 250 OP 636: Performed by: NURSE ANESTHETIST, CERTIFIED REGISTERED

## 2024-09-11 PROCEDURE — 43239 EGD BIOPSY SINGLE/MULTIPLE: CPT | Performed by: SURGERY

## 2024-09-11 PROCEDURE — 360N000075 HC SURGERY LEVEL 2, PER MIN: Performed by: SURGERY

## 2024-09-11 PROCEDURE — 258N000003 HC RX IP 258 OP 636: Performed by: NURSE ANESTHETIST, CERTIFIED REGISTERED

## 2024-09-11 PROCEDURE — 250N000009 HC RX 250: Performed by: NURSE ANESTHETIST, CERTIFIED REGISTERED

## 2024-09-11 PROCEDURE — 88305 TISSUE EXAM BY PATHOLOGIST: CPT | Mod: TC | Performed by: SURGERY

## 2024-09-11 PROCEDURE — 999N000141 HC STATISTIC PRE-PROCEDURE NURSING ASSESSMENT: Performed by: SURGERY

## 2024-09-11 RX ORDER — DEXAMETHASONE SODIUM PHOSPHATE 10 MG/ML
4 INJECTION, SOLUTION INTRAMUSCULAR; INTRAVENOUS
Status: DISCONTINUED | OUTPATIENT
Start: 2024-09-11 | End: 2024-09-11 | Stop reason: HOSPADM

## 2024-09-11 RX ORDER — PROPOFOL 10 MG/ML
INJECTION, EMULSION INTRAVENOUS PRN
Status: DISCONTINUED | OUTPATIENT
Start: 2024-09-11 | End: 2024-09-11

## 2024-09-11 RX ORDER — SODIUM CHLORIDE, SODIUM LACTATE, POTASSIUM CHLORIDE, CALCIUM CHLORIDE 600; 310; 30; 20 MG/100ML; MG/100ML; MG/100ML; MG/100ML
INJECTION, SOLUTION INTRAVENOUS CONTINUOUS PRN
Status: DISCONTINUED | OUTPATIENT
Start: 2024-09-11 | End: 2024-09-11

## 2024-09-11 RX ORDER — ONDANSETRON 2 MG/ML
4 INJECTION INTRAMUSCULAR; INTRAVENOUS EVERY 30 MIN PRN
Status: DISCONTINUED | OUTPATIENT
Start: 2024-09-11 | End: 2024-09-11 | Stop reason: HOSPADM

## 2024-09-11 RX ORDER — LIDOCAINE 40 MG/G
CREAM TOPICAL
Status: DISCONTINUED | OUTPATIENT
Start: 2024-09-11 | End: 2024-09-11 | Stop reason: HOSPADM

## 2024-09-11 RX ORDER — LIDOCAINE HYDROCHLORIDE 20 MG/ML
INJECTION, SOLUTION INFILTRATION; PERINEURAL PRN
Status: DISCONTINUED | OUTPATIENT
Start: 2024-09-11 | End: 2024-09-11

## 2024-09-11 RX ORDER — OXYCODONE HYDROCHLORIDE 5 MG/1
5 TABLET ORAL
Status: DISCONTINUED | OUTPATIENT
Start: 2024-09-11 | End: 2024-09-11 | Stop reason: HOSPADM

## 2024-09-11 RX ORDER — ONDANSETRON 4 MG/1
4 TABLET, ORALLY DISINTEGRATING ORAL EVERY 30 MIN PRN
Status: DISCONTINUED | OUTPATIENT
Start: 2024-09-11 | End: 2024-09-11 | Stop reason: HOSPADM

## 2024-09-11 RX ORDER — PROPOFOL 10 MG/ML
INJECTION, EMULSION INTRAVENOUS CONTINUOUS PRN
Status: DISCONTINUED | OUTPATIENT
Start: 2024-09-11 | End: 2024-09-11

## 2024-09-11 RX ORDER — NALOXONE HYDROCHLORIDE 0.4 MG/ML
0.1 INJECTION, SOLUTION INTRAMUSCULAR; INTRAVENOUS; SUBCUTANEOUS
Status: DISCONTINUED | OUTPATIENT
Start: 2024-09-11 | End: 2024-09-11 | Stop reason: HOSPADM

## 2024-09-11 RX ORDER — SUCRALFATE 1 G/1
1 TABLET ORAL 4 TIMES DAILY
Qty: 120 TABLET | Refills: 3 | Status: SHIPPED | OUTPATIENT
Start: 2024-09-11

## 2024-09-11 RX ORDER — OMEPRAZOLE 40 MG/1
40 CAPSULE, DELAYED RELEASE ORAL 2 TIMES DAILY
Qty: 60 CAPSULE | Refills: 3 | Status: SHIPPED | OUTPATIENT
Start: 2024-09-11

## 2024-09-11 RX ORDER — SODIUM CHLORIDE, SODIUM LACTATE, POTASSIUM CHLORIDE, CALCIUM CHLORIDE 600; 310; 30; 20 MG/100ML; MG/100ML; MG/100ML; MG/100ML
INJECTION, SOLUTION INTRAVENOUS CONTINUOUS
Status: DISCONTINUED | OUTPATIENT
Start: 2024-09-11 | End: 2024-09-11 | Stop reason: HOSPADM

## 2024-09-11 RX ADMIN — SODIUM CHLORIDE, POTASSIUM CHLORIDE, SODIUM LACTATE AND CALCIUM CHLORIDE: 600; 310; 30; 20 INJECTION, SOLUTION INTRAVENOUS at 11:11

## 2024-09-11 RX ADMIN — PROPOFOL 50 MG: 10 INJECTION, EMULSION INTRAVENOUS at 12:08

## 2024-09-11 RX ADMIN — PROPOFOL 250 MCG/KG/MIN: 10 INJECTION, EMULSION INTRAVENOUS at 12:08

## 2024-09-11 RX ADMIN — MIDAZOLAM 2 MG: 1 INJECTION INTRAMUSCULAR; INTRAVENOUS at 12:05

## 2024-09-11 RX ADMIN — PROPOFOL 200 MCG/KG/MIN: 10 INJECTION, EMULSION INTRAVENOUS at 12:15

## 2024-09-11 RX ADMIN — LIDOCAINE HYDROCHLORIDE 100 MG: 20 INJECTION, SOLUTION INFILTRATION; PERINEURAL at 12:07

## 2024-09-11 RX ADMIN — PROPOFOL 40 MG: 10 INJECTION, EMULSION INTRAVENOUS at 12:10

## 2024-09-11 ASSESSMENT — ACTIVITIES OF DAILY LIVING (ADL)
ADLS_ACUITY_SCORE: 35

## 2024-09-11 NOTE — OP NOTE
REPORT OF OPERATION  DATE OF PROCEDURE: 9/11/2024    PATIENT: Branden Villalobos Graham    SURGERY PERFORMED: Esophagogastroduodenoscopy with biopsies and colonoscopy     PREOPERATIVE DIAGNOSIS:   Abdominal Pain and Dysphagia   Screening colonoscopy    POSTOPERATIVE DIAGNOSIS:    Diffuse gastritis in the antrum    Squamous columnar junction at 38   Normal colonoscopy   Diverticulosis was not identified.   Hemorrhoids  were  identified.    SURGEON: Arjun Ackerman MD    ASSISTANTS: None    ANESTHESIA: Monitored Anesthesia Care    COMPLICATIONS: None apparent    TRANSFUSIONS: None    TISSUE TO PATHOLOGY: Duodenal, Antral, and Distal Esophageal    FINDINGS:   Diffuse patchy gastritis in the antrum   Normal colonoscopy   Diverticulosis was not identified.   Hemorrhoids  were  identified.    INDICATIONS: This is a 53 year old male in need of an Esophagogastroduodenoscopy for Abdominal Pain and Dysphagia and a colonoscopy for Screening colonoscopy.  The patient will be taken to the endoscopy suite for those procedures.    DESCRIPTIONS OF PROCEDURE IN DETAIL: After consent was obtained the patient was taken to the operative suite and sommer in the left lateral decubitus position.  The patient was identified and the correct patient was confirmed. Monitored Anesthesia Care was given by anesthesia. A time out was performed verifying the correct patient and the correct procedure.  The entire operative team was in agreement.  All necessary equipment and supplies were in the room.    The endoscope was inserted into the mouth and passed without difficulty to the third portion of the duodenum.  Duodenal biopsies were taken.  The endoscope was then withdrawn through the duodenum, the duodenal bulb and pyloric channel and no abnormalities were noted.  The endoscope was brought back into the stomach diffuse patchy gastritis in the antrum was noted.  Antral biopsies were obtained.  The endoscope was then retroflexed and no lesions of the  fundus body or antrum were seen.  The endoscope was straightened back out and brought into the distal esophagus and a well-defined squamocolumnar junction was identified at 38 cm. Biopsies of the distal esophagus were taken.  The endoscope was slowly withdrawn through the remaining esophagus no other abnormalities are seen,  The endoscope was withdrawn from the patient and the patient was positioned for colonoscopy.    Rectal exam was performed and no lesions of the anal canal were noted.  The colonoscope was inserted into the anus and passed without difficulty to the cecum.  The cecum was identified by the ileocecal valve, the coalescence of the tinea and the appendiceal orifice.  Upon withdrawal all walls of the colon were visualized.  There were no polyps, masses or evidence of colitis seen.  Diverticulosis was not seen.  Upon reaching the rectum the scope was retroflexed and internal hemorrhoids  were  seen.  The scope was straightened back out and removed from the patient.  The patient was then taken to the recovery room in stable condition tolerating the procedure well.      Prep: fair    Withdrawal time was 5 minutes.    It is recommended that the patient have another colonoscopy in 10 years.

## 2024-09-11 NOTE — ANESTHESIA CARE TRANSFER NOTE
Patient: Branden Becker    Procedure: Procedure(s):  COLONOSCOPY, WITH UPPER GASTROINTESTINAL TRACT ENDOSCOPY WITH BIOPSY       Diagnosis: Colon cancer screening [Z12.11]  Globus sensation [R09.A2]  Diagnosis Additional Information: No value filed.    Anesthesia Type:   MAC     Note:    Oropharynx: spontaneously breathing  Level of Consciousness: drowsy  Oxygen Supplementation: room air    Independent Airway: airway patency satisfactory and stable  Dentition: dentition unchanged  Vital Signs Stable: post-procedure vital signs reviewed and stable  Report to RN Given: handoff report given  Patient transferred to: Phase II    Handoff Report: Identifed the Patient, Identified the Reponsible Provider, Reviewed the pertinent medical history, Discussed the surgical course, Reviewed Intra-OP anesthesia mangement and issues during anesthesia, Set expectations for post-procedure period and Allowed opportunity for questions and acknowledgement of understanding  Vitals:  Vitals Value Taken Time   BP     Temp     Pulse     Resp     SpO2         Electronically Signed By: NATALIYA Dalton CRNA  September 11, 2024  12:32 PM

## 2024-09-11 NOTE — ANESTHESIA POSTPROCEDURE EVALUATION
Patient: Branden Becker    Procedure: Procedure(s):  COLONOSCOPY, WITH UPPER GASTROINTESTINAL TRACT ENDOSCOPY WITH BIOPSY       Anesthesia Type:  MAC    Note:  Disposition: Outpatient   Postop Pain Control: Uneventful            Sign Out: Well controlled pain   PONV: No   Neuro/Psych: Uneventful            Sign Out: Acceptable/Baseline neuro status   Airway/Respiratory: Uneventful            Sign Out: Acceptable/Baseline resp. status   CV/Hemodynamics: Uneventful            Sign Out: Acceptable CV status; No obvious hypovolemia; No obvious fluid overload   Other NRE: NONE   DID A NON-ROUTINE EVENT OCCUR? No           Last vitals:  Vitals Value Taken Time   /83 09/11/24 1315   Temp 97.8  F (36.6  C) 09/11/24 1238   Pulse 74 09/11/24 1315   Resp 16 09/11/24 1251   SpO2 97 % 09/11/24 1318   Vitals shown include unfiled device data.    Electronically Signed By: NATALIYA Laird CRNA  September 11, 2024  1:49 PM

## 2024-09-12 LAB
PATH REPORT.COMMENTS IMP SPEC: NORMAL
PATH REPORT.FINAL DX SPEC: NORMAL
PATH REPORT.GROSS SPEC: NORMAL
PATH REPORT.MICROSCOPIC SPEC OTHER STN: NORMAL
PATH REPORT.RELEVANT HX SPEC: NORMAL
PHOTO IMAGE: NORMAL

## 2024-09-24 ENCOUNTER — OFFICE VISIT (OUTPATIENT)
Dept: SURGERY | Facility: OTHER | Age: 54
End: 2024-09-24
Attending: NURSE PRACTITIONER
Payer: COMMERCIAL

## 2024-09-24 ENCOUNTER — PREP FOR PROCEDURE (OUTPATIENT)
Dept: SURGERY | Facility: OTHER | Age: 54
End: 2024-09-24

## 2024-09-24 VITALS
DIASTOLIC BLOOD PRESSURE: 66 MMHG | RESPIRATION RATE: 16 BRPM | HEART RATE: 89 BPM | TEMPERATURE: 97.6 F | OXYGEN SATURATION: 98 % | SYSTOLIC BLOOD PRESSURE: 110 MMHG | HEIGHT: 69 IN | WEIGHT: 206 LBS | BODY MASS INDEX: 30.51 KG/M2

## 2024-09-24 DIAGNOSIS — K20.90 ESOPHAGITIS: ICD-10-CM

## 2024-09-24 DIAGNOSIS — K29.00 OTHER ACUTE GASTRITIS WITHOUT HEMORRHAGE: Primary | ICD-10-CM

## 2024-09-24 DIAGNOSIS — K29.70 GASTRITIS: Primary | ICD-10-CM

## 2024-09-24 DIAGNOSIS — K21.00 GASTROESOPHAGEAL REFLUX DISEASE WITH ESOPHAGITIS WITHOUT HEMORRHAGE: ICD-10-CM

## 2024-09-24 PROCEDURE — 99213 OFFICE O/P EST LOW 20 MIN: CPT | Performed by: NURSE PRACTITIONER

## 2024-09-24 ASSESSMENT — PAIN SCALES - GENERAL: PAINLEVEL: NO PAIN (0)

## 2024-09-24 NOTE — PROGRESS NOTES
"CLINIC NOTE - POST-OP ENDOSCOPY  9/24/2024    Patient:Branden Becker    Procedure: Esophagogastroduodenoscopy with biopsy and Colonoscopy    This is a 53 year old male who is 2 weeks s/p Esophagogastroduodenoscopy with biopsy and Colonoscopy.  At the time of endoscopy gastritis was noted.  Patient is taking prilosec and carafate as prescribed with minimal improvement in his GI symptoms.      Current Medications:  Current Outpatient Medications   Medication Sig Dispense Refill    diphenhydrAMINE (BENADRYL) 50 MG capsule Take 50 mg by mouth every 6 hours as needed for itching or allergies.      ibuprofen (ADVIL/MOTRIN) 200 MG tablet Take 200 mg by mouth every 4 hours as needed for mild pain      omeprazole (PRILOSEC) 40 MG DR capsule Take 1 capsule (40 mg) by mouth 2 times daily. 60 capsule 3    sucralfate (CARAFATE) 1 GM tablet Take 1 tablet (1 g) by mouth 4 times daily. 120 tablet 3       Allergies:  No Known Allergies    PHYSICAL EXAM:   Vital signs: /66 (BP Location: Left arm, Cuff Size: Adult Regular)   Pulse 89   Temp 97.6  F (36.4  C) (Tympanic)   Resp 16   Ht 1.753 m (5' 9\")   Wt 93.4 kg (206 lb)   SpO2 98%   BMI 30.42 kg/m     BMI: Body mass index is 30.42 kg/m .   General: Normal, healthy, cooperative, in no acute distress, alert   Lungs: respirations are non-labored   Abdominal: non-distended       PATHOLOGY:  Case Report   Date Value Ref Range Status   09/11/2024   Final    Surgical Pathology Report                         Case: [id number]                                  Authorizing Provider:  Arjun Ackerman MD  Collected:           09/11/2024 12:12 PM          Ordering Location:     [facility] Operating [facility]     Received:            09/11/2024 12:51 PM          Pathologist:           Eduard Dsouza DO                                                         Specimens:   A) - Small Intestine, Duodenum                                                                      B) - Stomach, " Antrum                                                                                C) - Esophagus, Distal                                                                      Final Diagnosis   Date Value Ref Range Status   09/11/2024   Final    A.  Duodenum, biopsy:  -Duodenal mucosa with no diagnostic abnormality.    B.  Stomach, antrum, biopsy:  -Antral mucosa with no diagnostic abnormality.    C.  Esophagus, distal, biopsy:  -Squamous mucosa with features of an active reflux esophagitis.         ASSESSMENT:    53 year old male who is 2 weeks s/p Esophagogastroduodenoscopy with biopsy and Colonoscopy.  Gastritis, Esophagitis    PLAN:   Patient should continue prilosec and carafate at this time.  Will repeat an EGD on the patient in 4-6 weeks to assess for improvement/resolution of gastritis/esophagitis.       Recommend follow-up Colonoscopy in 10 years.

## 2024-10-21 ENCOUNTER — ANESTHESIA EVENT (OUTPATIENT)
Dept: SURGERY | Facility: HOSPITAL | Age: 54
End: 2024-10-21
Payer: COMMERCIAL

## 2024-10-21 ASSESSMENT — LIFESTYLE VARIABLES: TOBACCO_USE: 1

## 2024-10-21 NOTE — ANESTHESIA PREPROCEDURE EVALUATION
Anesthesia Pre-Procedure Evaluation    Patient: Branden Becker   MRN: [id number] : 1970        Procedure : Procedure(s):  ESOPHAGOGASTRODUODENOSCOPY with possible biopsy          Past Medical History:   Diagnosis Date     Arthritis      Cancer (H)      GERD (gastroesophageal reflux disease)     2016 - EGD without barretts changes.      Past Surgical History:   Procedure Laterality Date     ENDOSCOPY UPPER, COLONOSCOPY, COMBINED N/A 2024    Procedure: COLONOSCOPY, WITH UPPER GASTROINTESTINAL TRACT ENDOSCOPY WITH BIOPSY;  Surgeon: Arjun Ackerman MD;  Location: [facility]      No Known Allergies   Social History     Tobacco Use     Smoking status: Former     Current packs/day: 0.00     Types: Cigarettes     Quit date: 2006     Years since quittin.8     Smokeless tobacco: Never   Substance Use Topics     Alcohol use: Yes     Comment: a case a year at the most.       Wt Readings from Last 1 Encounters:   24 93.4 kg (206 lb)        Anesthesia Evaluation   Pt has had prior anesthetic. Type: MAC.        ROS/MED HX  ENT/Pulmonary: Comment: Pulmonary nodules    (+)                tobacco use, Past use,                       Neurologic:  - neg neurologic ROS     Cardiovascular:  - neg cardiovascular ROS     METS/Exercise Tolerance: >4 METS    Hematologic:  - neg hematologic  ROS     Musculoskeletal:  - neg musculoskeletal ROS     GI/Hepatic: Comment: Globulus sensation    (+) GERD, Asymptomatic on medication,      bowel prep,            Renal/Genitourinary:  - neg Renal ROS     Endo:     (+)               Obesity,       Psychiatric/Substance Use:  - neg psychiatric ROS     Infectious Disease:  - neg infectious disease ROS     Malignancy: Comment: CLL carrier has not needed any treatments yet and gets checked every 6 months   (+) Malignancy, History of Lymphoma/Leukemia.    Other:  - neg other ROS          Physical Exam    Airway        Mallampati: III   TM distance: > 3 FB   Neck ROM: full  "  Mouth opening: < 3 cm    Respiratory Devices and Support         Dental       (+) Modest Abnormalities - crowns, retainers, 1 or 2 missing teeth      Cardiovascular   cardiovascular exam normal       Rhythm and rate: regular and normal     Pulmonary   pulmonary exam normal        breath sounds clear to auscultation       OUTSIDE LABS:  CBC:   Lab Results   Component Value Date    WBC 15.0 (H) 07/08/2024    WBC 15.0 (H) 06/26/2024    HGB 15.8 07/08/2024    HGB 15.0 06/26/2024    HCT 45.1 07/08/2024    HCT 44.3 06/26/2024     07/08/2024     06/26/2024     BMP:   Lab Results   Component Value Date     06/26/2024     12/27/2023    POTASSIUM 4.2 06/26/2024    POTASSIUM 4.0 12/27/2023    CHLORIDE 105 06/26/2024    CHLORIDE 104 12/27/2023    CO2 24 06/26/2024    CO2 23 12/27/2023    BUN 14.2 06/26/2024    BUN 14.8 12/27/2023    CR 0.85 06/26/2024    CR 0.82 12/27/2023     (H) 06/26/2024     (H) 12/27/2023     COAGS: No results found for: \"PTT\", \"INR\", \"FIBR\"  POC: No results found for: \"BGM\", \"HCG\", \"HCGS\"  HEPATIC:   Lab Results   Component Value Date    ALBUMIN 4.1 06/26/2024    PROTTOTAL 6.6 06/26/2024    ALT 38 06/26/2024    AST 25 06/26/2024    ALKPHOS 61 06/26/2024    BILITOTAL 0.7 06/26/2024     OTHER:   Lab Results   Component Value Date    LACT 1.7 11/26/2020    A1C 5.8 (H) 12/27/2023    BRENDON 8.9 06/26/2024    CRP <2.9 11/26/2020    SED 5 11/26/2020       Anesthesia Plan    ASA Status:  3    NPO Status:  NPO Appropriate    Anesthesia Type: MAC.     - Reason for MAC: straight local not clinically adequate              Consents    Anesthesia Plan(s) and associated risks, benefits, and realistic alternatives discussed. Questions answered and patient/representative(s) expressed understanding.     - Discussed:     - Discussed with:  Patient            Postoperative Care            Comments:    Other Comments: Day of surgery [facility]     Surgeon requests deep sedation. Patient has a " "Mallampati 3 airway. Will provide MAC.     Risks and benefits of MAC anesthetic discussed including dental damage, aspiration, loss of airway, conversion to general anesthetic, CV complications, MI, stroke, death. Pt wishes to proceed.                NATALIYA BEY CRNA    I have reviewed the pertinent notes and labs in the chart from the past 30 days and (re)examined the patient.  Any updates or changes from those notes are reflected in this note.                       # Obesity: Estimated body mass index is 30.42 kg/m  as calculated from the following:    Height as of 9/24/24: 1.753 m (5' 9\").    Weight as of 9/24/24: 93.4 kg (206 lb).             "

## 2024-10-28 ENCOUNTER — HOSPITAL ENCOUNTER (OUTPATIENT)
Facility: HOSPITAL | Age: 54
Discharge: HOME | End: 2024-10-28
Attending: SURGERY | Admitting: SURGERY
Payer: COMMERCIAL

## 2024-10-28 ENCOUNTER — ANESTHESIA (OUTPATIENT)
Dept: SURGERY | Facility: HOSPITAL | Age: 54
End: 2024-10-28
Payer: COMMERCIAL

## 2024-10-28 VITALS
SYSTOLIC BLOOD PRESSURE: 104 MMHG | TEMPERATURE: 98.3 F | BODY MASS INDEX: 30.66 KG/M2 | OXYGEN SATURATION: 93 % | WEIGHT: 207 LBS | HEIGHT: 69 IN | HEART RATE: 65 BPM | RESPIRATION RATE: 16 BRPM | DIASTOLIC BLOOD PRESSURE: 74 MMHG

## 2024-10-28 PROCEDURE — 360N000075 HC SURGERY LEVEL 2, PER MIN: Performed by: SURGERY

## 2024-10-28 PROCEDURE — 88305 TISSUE EXAM BY PATHOLOGIST: CPT | Mod: TC | Performed by: SURGERY

## 2024-10-28 PROCEDURE — 272N000001 HC OR GENERAL SUPPLY STERILE: Performed by: SURGERY

## 2024-10-28 PROCEDURE — 250N000009 HC RX 250: Performed by: NURSE ANESTHETIST, CERTIFIED REGISTERED

## 2024-10-28 PROCEDURE — 250N000011 HC RX IP 250 OP 636: Performed by: NURSE ANESTHETIST, CERTIFIED REGISTERED

## 2024-10-28 PROCEDURE — 43239 EGD BIOPSY SINGLE/MULTIPLE: CPT | Performed by: SURGERY

## 2024-10-28 PROCEDURE — 88305 TISSUE EXAM BY PATHOLOGIST: CPT | Mod: 26 | Performed by: PATHOLOGY

## 2024-10-28 PROCEDURE — 370N000017 HC ANESTHESIA TECHNICAL FEE, PER MIN: Performed by: SURGERY

## 2024-10-28 PROCEDURE — 999N000141 HC STATISTIC PRE-PROCEDURE NURSING ASSESSMENT: Performed by: SURGERY

## 2024-10-28 PROCEDURE — 43239 EGD BIOPSY SINGLE/MULTIPLE: CPT | Performed by: NURSE ANESTHETIST, CERTIFIED REGISTERED

## 2024-10-28 PROCEDURE — 710N000012 HC RECOVERY PHASE 2, PER MINUTE: Performed by: SURGERY

## 2024-10-28 RX ORDER — ONDANSETRON 2 MG/ML
4 INJECTION INTRAMUSCULAR; INTRAVENOUS EVERY 30 MIN PRN
Status: DISCONTINUED | OUTPATIENT
Start: 2024-10-28 | End: 2024-10-28 | Stop reason: HOSPADM

## 2024-10-28 RX ORDER — LIDOCAINE HYDROCHLORIDE 20 MG/ML
INJECTION, SOLUTION INFILTRATION; PERINEURAL PRN
Status: DISCONTINUED | OUTPATIENT
Start: 2024-10-28 | End: 2024-10-28

## 2024-10-28 RX ORDER — LIDOCAINE 40 MG/G
CREAM TOPICAL
Status: DISCONTINUED | OUTPATIENT
Start: 2024-10-28 | End: 2024-10-28 | Stop reason: HOSPADM

## 2024-10-28 RX ORDER — DEXAMETHASONE SODIUM PHOSPHATE 10 MG/ML
4 INJECTION, SOLUTION INTRAMUSCULAR; INTRAVENOUS
Status: DISCONTINUED | OUTPATIENT
Start: 2024-10-28 | End: 2024-10-28 | Stop reason: HOSPADM

## 2024-10-28 RX ORDER — SODIUM CHLORIDE, SODIUM LACTATE, POTASSIUM CHLORIDE, CALCIUM CHLORIDE 600; 310; 30; 20 MG/100ML; MG/100ML; MG/100ML; MG/100ML
INJECTION, SOLUTION INTRAVENOUS CONTINUOUS
Status: DISCONTINUED | OUTPATIENT
Start: 2024-10-28 | End: 2024-10-28 | Stop reason: HOSPADM

## 2024-10-28 RX ORDER — OXYCODONE HYDROCHLORIDE 5 MG/1
5 TABLET ORAL
Status: DISCONTINUED | OUTPATIENT
Start: 2024-10-28 | End: 2024-10-28 | Stop reason: HOSPADM

## 2024-10-28 RX ORDER — PROPOFOL 10 MG/ML
INJECTION, EMULSION INTRAVENOUS PRN
Status: DISCONTINUED | OUTPATIENT
Start: 2024-10-28 | End: 2024-10-28

## 2024-10-28 RX ORDER — ONDANSETRON 4 MG/1
4 TABLET, ORALLY DISINTEGRATING ORAL EVERY 30 MIN PRN
Status: DISCONTINUED | OUTPATIENT
Start: 2024-10-28 | End: 2024-10-28 | Stop reason: HOSPADM

## 2024-10-28 RX ORDER — NALOXONE HYDROCHLORIDE 0.4 MG/ML
0.1 INJECTION, SOLUTION INTRAMUSCULAR; INTRAVENOUS; SUBCUTANEOUS
Status: DISCONTINUED | OUTPATIENT
Start: 2024-10-28 | End: 2024-10-28 | Stop reason: HOSPADM

## 2024-10-28 RX ADMIN — PROPOFOL 30 MG: 10 INJECTION, EMULSION INTRAVENOUS at 13:32

## 2024-10-28 RX ADMIN — LIDOCAINE HYDROCHLORIDE 40 MG: 20 INJECTION, SOLUTION INFILTRATION; PERINEURAL at 13:29

## 2024-10-28 RX ADMIN — PROPOFOL 30 MG: 10 INJECTION, EMULSION INTRAVENOUS at 13:30

## 2024-10-28 RX ADMIN — PROPOFOL 70 MG: 10 INJECTION, EMULSION INTRAVENOUS at 13:29

## 2024-10-28 ASSESSMENT — ACTIVITIES OF DAILY LIVING (ADL)
ADLS_ACUITY_SCORE: 0

## 2024-10-28 NOTE — ANESTHESIA POSTPROCEDURE EVALUATION
Patient: Branden Becker    Procedure: Procedure(s):  ESOPHAGOGASTRODUODENOSCOPY with biopsy       Anesthesia Type:  MAC    Note:  Disposition: Outpatient   Postop Pain Control: Uneventful            Sign Out: Well controlled pain   PONV: No   Neuro/Psych: Uneventful            Sign Out: Acceptable/Baseline neuro status   Airway/Respiratory: Uneventful            Sign Out: Acceptable/Baseline resp. status   CV/Hemodynamics: Uneventful            Sign Out: Acceptable CV status; No obvious hypovolemia; No obvious fluid overload   Other NRE: NONE   DID A NON-ROUTINE EVENT OCCUR? No       Last vitals:  Vitals Value Taken Time   /74 10/28/24 1345   Temp 98.3  F (36.8  C) 10/28/24 1340   Pulse 65 10/28/24 1345   Resp 16 10/28/24 1345   SpO2 96 % 10/28/24 1359   Vitals shown include unfiled device data.    Electronically Signed By: NATALIYA Higginbotham CRNA  October 28, 2024  3:15 PM

## 2024-10-28 NOTE — ANESTHESIA CARE TRANSFER NOTE
Patient: Branden Becker    Procedure: Procedure(s):  ESOPHAGOGASTRODUODENOSCOPY with biopsy       Diagnosis: Gastritis [K29.70]  Esophagitis [K20.90]  Diagnosis Additional Information: No value filed.    Anesthesia Type:   MAC     Note:    Oropharynx: oropharynx clear of all foreign objects  Level of Consciousness: awake  Oxygen Supplementation: room air    Independent Airway: airway patency satisfactory and stable  Dentition: dentition unchanged  Vital Signs Stable: post-procedure vital signs reviewed and stable  Report to RN Given: handoff report given  Patient transferred to: Phase II    Handoff Report: Identifed the Patient, Identified the Reponsible Provider, Reviewed the pertinent medical history, Discussed the surgical course, Reviewed Intra-OP anesthesia mangement and issues during anesthesia, Set expectations for post-procedure period and Allowed opportunity for questions and acknowledgement of understanding      Vitals:  Vitals Value Taken Time   BP     Temp     Pulse     Resp     SpO2 93%        Electronically Signed By: NATALIYA Krause CRNA  October 28, 2024  1:39 PM

## 2024-10-28 NOTE — OP NOTE
REPORT OF OPERATION  DATE OF PROCEDURE: 10/28/2024    PATIENT: Branden Villalobos Newberg    SURGERY PERFORMED: Esophagogastroduodenoscopy with biopsies     PREOPERATIVE DIAGNOSIS: History of Peptic Ulcer Disease    POSTOPERATIVE DIAGNOSIS:    Normal Esophagogastroduodenoscopy   Squamous columnar junction at 35    SURGEON: Arjun Ackerman MD    ASSISTANTS: None    ANESTHESIA: Monitored Anesthesia Care    COMPLICATIONS: None apparent    TRANSFUSIONS: None    TISSUE TO PATHOLOGY: Duodenal, Antral, and Distal Esophageal    FINDINGS: Normal Esophagogastroduodenoscopy    INDICATIONS: This is a 53 year old male in need of an Esophagogastroduodenoscopy for History of Peptic Ulcer Disease.  The patient will be taken to the endoscopy suite for that procedure.    DESCRIPTIONS OF PROCEDURE IN DETAIL: After consent was obtained the patient was taken to the operative suite and sommer in the left lateral decubitus position.  The patient was identified and the correct patient was confirmed. Monitored Anesthesia Care was given by anesthesia. A time out was performed verifying the correct patient and the correct procedure.  The entire operative team was in agreement.  All necessary equipment and supplies were in the room.    The endoscope was inserted into the mouth and passed without difficulty to the third portion of the duodenum.  Duodenal biopsies were taken.  The endoscope was then withdrawn through the duodenum, the duodenal bulb and pyloric channel and no abnormalities were noted.  The endoscope was brought back into the stomach and antral biopsies were obtained.  The endoscope was then retroflexed and no lesions of the fundus body or antrum were seen.  The endoscope was straightened back out and brought into the distal esophagus and a well-defined squamocolumnar junction was identified at 35 cm. Biopsies of the distal esophagus were taken.  The endoscope was slowly withdrawn through the remaining esophagus no other abnormalities are  seen,  The endoscope was withdrawn from the patient the patient was then taken to the recovery room in stable condition tolerated the procedure well.

## 2024-10-28 NOTE — H&P
"HISTORY AND PHYSICAL - ENDOSCOPY   10/28/2024    Patient : Branden Becker    Planned Procedures : Esophagogastroduodenoscopy     This is a 53 year old male with a need for an Esophagogastroduodenoscopy for History of Peptic Ulcer Disease.    Past Medical History:  Past Medical History:   Diagnosis Date    Arthritis     Cancer (H)     GERD (gastroesophageal reflux disease)     2016 - EGD without barretts changes.       Past Surgical History:  Past Surgical History:   Procedure Laterality Date    ENDOSCOPY UPPER, COLONOSCOPY, COMBINED N/A 9/11/2024    Procedure: COLONOSCOPY, WITH UPPER GASTROINTESTINAL TRACT ENDOSCOPY WITH BIOPSY;  Surgeon: Arjun Ackerman MD;  Location: [facility]       Family History History:  Family History   Problem Relation Age of Onset    Obesity Mother     Cerebrovascular Disease Mother     Diabetes No family hx of     Coronary Artery Disease No family hx of     Hypertension No family hx of     Hyperlipidemia No family hx of     Breast Cancer No family hx of     Colon Cancer No family hx of     Prostate Cancer No family hx of     Anesthesia Reaction No family hx of     Asthma No family hx of     Genetic Disorder No family hx of     Thyroid Disease No family hx of        History of Tobacco Use:  History   Smoking Status    Former    Types: Cigarettes   Smokeless Tobacco    Never       Current Medications:  No current outpatient medications on file.       Allergies:  No Known Allergies    ROS:  Pertinent items are noted in HPI.  All other systems are negative.    PHYSICAL EXAM:     Vital signs: /86   Pulse 76   Temp 98.3  F (36.8  C) (Tympanic)   Resp 16   Ht 1.753 m (5' 9\")   Wt 93.9 kg (207 lb)   SpO2 97%   BMI 30.57 kg/m     Weight: [unfilled]   BMI: Body mass index is 30.57 kg/m .   General: Normal, healthy, cooperative, in no acute distress, alert   Skin: no jaundice   HEENT: PERRLA and EOMI   Neck: supple   Lungs: clear to auscultation   CV: Regular rate and rhythm without " murmer   Abdominal: abdomen is soft without significant tenderness, masses, organomegaly or guarding   Extremities: No cyanosis, clubbing or edema noted bilaterally in Upper and Lower Extremities   Neurological: without deficit    Assessment:   53 year old male with need of an Esophagogastroduodenoscopy for History of Peptic Ulcer Disease.    Plan:   Will plan on doing an Esophagogastroduodenoscopy.      The risks, benefits, and alternatives to the planned procedure were fully discussed with the patient and/or the patient's representative(s). The risks of bleeding, infection, death, missing pathology, the need for additional procedures intra-operatively, the possible need for intra-operative consults, the possible need for transfusion therapy, cardiopulmonary compromise, the possible need for additional surgery for a complication were discussed with the patient and/or the patient's representative(s). The patient's and/or patient's representative(s) questions were addressed and answered. Informed consent was obtained from the patient and/or the patient's representative(s). The patient and/or the patient's representative(s) consent to proceed.    Specific risks:  Risks include but are not limited to bleeding, perforation, missing lesions, need for additional procedures, reaction to anesthesia.  All the patients questions were answered.  The patient consents to proceed.  The procedures will be scheduled.

## 2024-10-28 NOTE — DISCHARGE INSTRUCTIONS
Upper GI Endoscopy: What to Expect at Home  Your Recovery  You had an upper GI endoscopy. Your doctor used a thin, lighted tube that bends to look at the inside of your esophagus, your stomach, and the first part of the small intestine, called the duodenum.  After you have an endoscopy, you will stay at the hospital or clinic for 1 to 2 hours. This will allow the medicine to wear off. You will be able to go home after your doctor or nurse checks to make sure that you're not having any problems.  You may have to stay overnight if you had treatment during the test. You may have a sore throat for a day or two after the test.  This care sheet gives you a general idea about what to expect after the test.  How can you care for yourself at home?  Activity   Rest as much as you need to after you go home.  You should be able to go back to your usual activities the day after the test.  Diet   Follow your doctor's directions for eating after the test.  Drink plenty of fluids (unless your doctor has told you not to).  Medications   If you have a sore throat the day after the test, use an over-the-counter spray to numb your throat.  Follow-up care is a key part of your treatment and safety. Be sure to make and go to all appointments, and call your doctor if you are having problems. It's also a good idea to know your test results and keep a list of the medicines you take.  When should you call for help?   Call 911 anytime you think you may need emergency care. For example, call if:    You passed out (lost consciousness).     You have trouble breathing.     You pass maroon or bloody stools.   Call your doctor now or seek immediate medical care if:    You have pain that does not get better after your take pain medicine.     You have new or worse belly pain.     You have blood in your stools.     You are sick to your stomach and cannot keep fluids down.     You have a fever.     You cannot pass stools or gas.   Watch closely for  "changes in your health, and be sure to contact your doctor if:    Your throat still hurts after a day or two.     You do not get better as expected.   Where can you learn more?  Go to https://[email or web address].net/patiented  Enter J454 in the search box to learn more about \"Upper GI Endoscopy: What to Expect at Home.\"  Current as of: October 19, 2023  Content Version: 14.2 2024 [employer].   Care instructions adapted under license by your healthcare professional. If you have questions about a medical condition or this instruction, always ask your healthcare professional. [employer] disclaims any warranty or liability for your use of this information.  After Anesthesia (Sleep Medicine)  What should I do after anesthesia?  You should rest and relax for the next 24 hours. Avoid risky or difficult (strenuous) activity. A responsible adult should stay with you overnight.  Don't drive or use any heavy equipment for 24 hours. Even if you feel normal, your reactions may be affected by the sleep medicine given to you.  Don't drink alcohol or make any important decisions for 24 hours.  Slowly get back to your regular diet, as you feel able.  How should I expect to feel?  It's normal to feel dizzy, light-headed, or faint for up to a full day after anesthesia or while taking pain medicine. If this happens:   Sit down for a few minutes before standing.  Have someone help you when you get up to walk or use the bathroom.  If you have nausea (feel sick to your stomach) or vomit (throw up):   Drink clear liquids (such as apple juice, ginger ale, broth, or 7UP) until you feel better.  If you feel sick to your stomach, or you keep vomiting for 24 hours, please call the doctor.  What else should I know?  You might have a dry mouth, sore throat, muscle aches, or trouble sleeping. These should go away after 24 hours.  Please contact your doctor if you have any other symptoms that concern you, such as fever, " pain, bleeding, fluid drainage, swelling, or headache, or if it's been over 8 to 10 hours and you still aren't able to pee (urinate).  If you have a history of sleep apnea, it's very important to use your CPAP machine for the next 24 hours when you nap or sleep.   For informational purposes only. Not to replace the advice of your health care provider. Copyright   2023 [facility]. All rights reserved. Clinically reviewed by Grant Bryant MD. [employer] [id number] - REV 09/23.

## 2024-10-31 LAB
PATH REPORT.COMMENTS IMP SPEC: NORMAL
PATH REPORT.COMMENTS IMP SPEC: NORMAL
PATH REPORT.FINAL DX SPEC: NORMAL
PATH REPORT.GROSS SPEC: NORMAL
PATH REPORT.MICROSCOPIC SPEC OTHER STN: NORMAL
PATH REPORT.RELEVANT HX SPEC: NORMAL
PHOTO IMAGE: NORMAL

## 2024-11-12 ENCOUNTER — OFFICE VISIT (OUTPATIENT)
Dept: SURGERY | Facility: OTHER | Age: 54
End: 2024-11-12
Attending: NURSE PRACTITIONER
Payer: COMMERCIAL

## 2024-11-12 VITALS
SYSTOLIC BLOOD PRESSURE: 123 MMHG | WEIGHT: 207.01 LBS | HEIGHT: 69 IN | OXYGEN SATURATION: 97 % | BODY MASS INDEX: 30.66 KG/M2 | RESPIRATION RATE: 18 BRPM | TEMPERATURE: 98.3 F | HEART RATE: 85 BPM | DIASTOLIC BLOOD PRESSURE: 89 MMHG

## 2024-11-12 DIAGNOSIS — Z87.19 HISTORY OF GASTRITIS: ICD-10-CM

## 2024-11-12 DIAGNOSIS — J34.89 SINUS DRAINAGE: Primary | ICD-10-CM

## 2024-11-12 DIAGNOSIS — R09.89 THROAT CLEARING: ICD-10-CM

## 2024-11-12 ASSESSMENT — PAIN SCALES - GENERAL: PAINLEVEL_OUTOF10: NO PAIN (0)

## 2024-11-12 NOTE — PROGRESS NOTES
"CLINIC NOTE - POST-OP ENDOSCOPY  11/12/2024    Patient:Branden Becker    Procedure: Esophagogastroduodenoscopy with biopsy    This is a 53 year old male who is 2 weeks s/p Esophagogastroduodenoscopy with biopsy.  At the time of endoscopy a normal EGD was noted.  The patient has a history of gastritis.  He denies GERD/heartburn symptoms at this time.    He does note sinus drainage and frequent throat clearing which is bothersome to him.      Current Medications:  Current Outpatient Medications   Medication Sig Dispense Refill    diphenhydrAMINE (BENADRYL) 50 MG capsule Take 50 mg by mouth every 6 hours as needed for itching or allergies.      ibuprofen (ADVIL/MOTRIN) 200 MG tablet Take 200 mg by mouth every 4 hours as needed for mild pain      omeprazole (PRILOSEC) 40 MG DR capsule Take 1 capsule (40 mg) by mouth 2 times daily. 60 capsule 3       Allergies:  No Known Allergies    PHYSICAL EXAM:   Vital signs: /89 (BP Location: Left arm, Patient Position: Sitting, Cuff Size: Adult Regular)   Pulse 85   Temp 98.3  F (36.8  C) (Tympanic)   Resp 18   Ht 1.753 m (5' 9.02\")   Wt 93.9 kg (207 lb 0.2 oz)   SpO2 97%   BMI 30.56 kg/m     BMI: Body mass index is 30.56 kg/m .   General: Normal, healthy, cooperative, in no acute distress, alert   Lungs: respirations are non-labored   Abdominal: non-distended       PATHOLOGY:  Case Report   Date Value Ref Range Status   10/28/2024   Final    Surgical Pathology Report                         Case: [id number]                                  Authorizing Provider:  Arjun Ackerman MD  Collected:           10/28/2024 01:32 PM          Ordering Location:     [facility]     Received:            10/30/2024 07:18 AM          Pathologist:           Nickolas Huang MD                                                            Specimens:   A) - Small Intestine, Duodenum                                                                      B) - Stomach, Antrum  "                                                                               C) - Esophagus, Distal                                                                      Final Diagnosis   Date Value Ref Range Status   10/28/2024   Final    A. Duodenum, biopsies:  --No significant histopathologic change.    B. Stomach, biopsies:  --Gastric mucosa with no significant histopathologic change.  --Negative for Helicobacter pylori organisms or dysplasia.      C. Esophagus, distal, biopsies:  --No significant histopathologic change.             ASSESSMENT:    53 year old male who is 2 weeks s/p Esophagogastroduodenoscopy with biopsy.  History of Gastritis, resolved; throat clearing and sinus drainage    PLAN:   Follow up as needed with surgery and repeat EGD as needed in the future with problems.  Referral to ENT made for his throat clearing and sinus drainage.

## 2024-12-31 ENCOUNTER — LAB (OUTPATIENT)
Dept: LAB | Facility: OTHER | Age: 54
End: 2024-12-31
Payer: COMMERCIAL

## 2024-12-31 DIAGNOSIS — C91.10 CLL (CHRONIC LYMPHOCYTIC LEUKEMIA) (H): ICD-10-CM

## 2024-12-31 LAB
ACANTHOCYTES BLD QL SMEAR: SLIGHT
ALBUMIN SERPL BCG-MCNC: 4.4 G/DL (ref 3.5–5.2)
ALP SERPL-CCNC: 76 U/L (ref 40–150)
ALT SERPL W P-5'-P-CCNC: 35 U/L (ref 0–70)
ANION GAP SERPL CALCULATED.3IONS-SCNC: 14 MMOL/L (ref 7–15)
AST SERPL W P-5'-P-CCNC: 26 U/L (ref 0–45)
BASOPHILS # BLD MANUAL: 0 10E3/UL (ref 0–0.2)
BASOPHILS NFR BLD MANUAL: 0 %
BILIRUB SERPL-MCNC: 0.6 MG/DL
BUN SERPL-MCNC: 11.7 MG/DL (ref 6–20)
BURR CELLS BLD QL SMEAR: SLIGHT
CALCIUM SERPL-MCNC: 9 MG/DL (ref 8.8–10.4)
CHLORIDE SERPL-SCNC: 103 MMOL/L (ref 98–107)
CREAT SERPL-MCNC: 0.92 MG/DL (ref 0.67–1.17)
EGFRCR SERPLBLD CKD-EPI 2021: >90 ML/MIN/1.73M2
EOSINOPHIL # BLD MANUAL: 0.5 10E3/UL (ref 0–0.7)
EOSINOPHIL NFR BLD MANUAL: 3 %
ERYTHROCYTE [DISTWIDTH] IN BLOOD BY AUTOMATED COUNT: 14 % (ref 10–15)
GIANT PLATELETS BLD QL SMEAR: SLIGHT
GLUCOSE SERPL-MCNC: 102 MG/DL (ref 70–99)
HCO3 SERPL-SCNC: 22 MMOL/L (ref 22–29)
HCT VFR BLD AUTO: 46 % (ref 40–53)
HGB BLD-MCNC: 15.6 G/DL (ref 13.3–17.7)
LDH SERPL L TO P-CCNC: 203 U/L (ref 0–250)
LYMPHOCYTES # BLD MANUAL: 10.8 10E3/UL (ref 0.8–5.3)
LYMPHOCYTES NFR BLD MANUAL: 66 %
MCH RBC QN AUTO: 29.6 PG (ref 26.5–33)
MCHC RBC AUTO-ENTMCNC: 33.9 G/DL (ref 31.5–36.5)
MCV RBC AUTO: 87 FL (ref 78–100)
MONOCYTES # BLD MANUAL: 0.7 10E3/UL (ref 0–1.3)
MONOCYTES NFR BLD MANUAL: 4 %
NEUTROPHILS # BLD MANUAL: 4.4 10E3/UL (ref 1.6–8.3)
NEUTROPHILS NFR BLD MANUAL: 27 %
PLAT MORPH BLD: ABNORMAL
PLATELET # BLD AUTO: 193 10E3/UL (ref 150–450)
POTASSIUM SERPL-SCNC: 4.1 MMOL/L (ref 3.4–5.3)
PROT SERPL-MCNC: 6.9 G/DL (ref 6.4–8.3)
RBC # BLD AUTO: 5.27 10E6/UL (ref 4.4–5.9)
RBC MORPH BLD: ABNORMAL
SMUDGE CELLS BLD QL SMEAR: PRESENT
SODIUM SERPL-SCNC: 139 MMOL/L (ref 135–145)
STOMATOCYTES BLD QL SMEAR: SLIGHT
WBC # BLD AUTO: 16.3 10E3/UL (ref 4–11)

## 2024-12-31 PROCEDURE — 85007 BL SMEAR W/DIFF WBC COUNT: CPT

## 2024-12-31 PROCEDURE — 85027 COMPLETE CBC AUTOMATED: CPT

## 2024-12-31 PROCEDURE — 83615 LACTATE (LD) (LDH) ENZYME: CPT

## 2024-12-31 PROCEDURE — 80053 COMPREHEN METABOLIC PANEL: CPT

## 2024-12-31 PROCEDURE — 36415 COLL VENOUS BLD VENIPUNCTURE: CPT

## 2025-01-07 ENCOUNTER — ONCOLOGY VISIT (OUTPATIENT)
Dept: ONCOLOGY | Facility: OTHER | Age: 55
End: 2025-01-07
Attending: NURSE PRACTITIONER
Payer: COMMERCIAL

## 2025-01-07 VITALS
BODY MASS INDEX: 33.76 KG/M2 | SYSTOLIC BLOOD PRESSURE: 128 MMHG | TEMPERATURE: 98.1 F | DIASTOLIC BLOOD PRESSURE: 74 MMHG | OXYGEN SATURATION: 98 % | HEIGHT: 69 IN | WEIGHT: 227.96 LBS | HEART RATE: 78 BPM

## 2025-01-07 DIAGNOSIS — L98.9 SKIN ABNORMALITY: ICD-10-CM

## 2025-01-07 DIAGNOSIS — C91.10 CLL (CHRONIC LYMPHOCYTIC LEUKEMIA) (H): Primary | ICD-10-CM

## 2025-01-07 DIAGNOSIS — R91.8 PULMONARY NODULES: ICD-10-CM

## 2025-01-07 DIAGNOSIS — R59.1 LYMPHADENOPATHY: ICD-10-CM

## 2025-01-07 DIAGNOSIS — J34.89 SINUS DRAINAGE: ICD-10-CM

## 2025-01-07 ASSESSMENT — PAIN SCALES - GENERAL: PAINLEVEL_OUTOF10: NO PAIN (0)

## 2025-01-07 NOTE — PROGRESS NOTES
Hematology Follow-up Visit    Reason for Visit:  Branden is a 54 year old gentleman with a diagnosis of CLL, who presents to the clinic today for routine follow-up.    Nursing Note and documentation reviewed: Yes    Interval History:   Overall, he has been doing alright. He has been dealing with constant drainage and having to clear his throat. He did have an EGD done which showed some reflux esophagitis. Started PPI, Hasn't noticed much difference. Seeing ENT next week.    He otherwise denies signs of infection. NO fever, chills, chest pain, cough, or SOB. No bleeding concerns. Appetite is good. Weight up a bit. No night sweats. No abdominal discomfort or early satiety. No change in left cervical node, no other nodes. Does continue to have a small lesion above right areola. Unchanged. Overall, no big changes or concerns.     History of Present Illness:     For full history, please see prior documentation of Dr. Erickson.     In short, the patient returns for followup of stage 0 chronic lymphocytic leukemia with history of lung nodules. He had seen the patient in consultation at the request of Dr. Vanessa Hastings on 09/24/2020. At that time, the patient was a 50-year-old white male with a history of tobacco abuse. Dr. Erickson was asked to evaluate concerning diagnosis of lymphocytosis and pulmonary nodules. He presented to the emergency room with right sided abdominal pain, was found to have ureteral stone with mild hydronephrosis. The patient was given IV fluids.     At that time, a CBC was drawn revealed a white count of 17.5 with H and H 15.6, 42.3, platelet count 241,000. Peripheral blood smear was ordered and was read as mild lymphocytosis, which could be associated with CLL or possibly well-differentiated lymphocytic lymphoma. The patient also had a CT chest done on 12/05/2020 which was read as multiple small noncalcified pulmonary nodules. It was mainly in the right and left lower lobes, all measured less than 6  mm. The patient did have a history of tobacco abuse in the past and quit 13 years ago. He was in [place] and was a war vet there and was in the [employer].     When we saw the patient, we wanted to rule out CLL. We obtained peripheral blood for flow cytometry. This revealed the patient had 37% CD5 positive kappa monotypic cells. This was all consistent with CLL. We ordered a PET scan, was essentially negative. Lung nodules seen on previous CT were not PET avid. There was no lymphadenopathy or splenomegaly. When we saw the patient on 2021, we recommended a FISH panel to rule out 17p deletion. The patient was found to be 17p deletion negative; therefore, the prognosis was excellent and the plan was to watch and wait. He had a repeat CT chest on 2021 which was read as stable findings.     He has been followed in surveillance.     Current Treatment: None    Past Medical History:   Diagnosis Date    Arthritis     Cancer (H)     GERD (gastroesophageal reflux disease)      - EGD without barretts changes.       Social History     Socioeconomic History    Marital status:      Spouse name: Maryam    Number of children: 6    Years of education: Not on file    Highest education level: Not on file   Occupational History    Not on file   Tobacco Use    Smoking status: Former     Current packs/day: 0.00     Types: Cigarettes     Quit date: 2006     Years since quittin.0    Smokeless tobacco: Never   Vaping Use    Vaping status: Never Used   Substance and Sexual Activity    Alcohol use: Yes     Comment: a case a year at the most.     Drug use: Never    Sexual activity: Yes     Partners: Female   Other Topics Concern    Parent/sibling w/ CABG, MI or angioplasty before 65F 55M? Not Asked   Social History Narrative    Preloaded 13     Social Drivers of Health     Financial Resource Strain: Low Risk  (2023)    Financial Resource Strain     Within the past 12 months, have you or your family members  you live with been unable to get utilities (heat, electricity) when it was really needed?: No   Food Insecurity: High Risk (12/27/2023)    Food Insecurity     Within the past 12 months, did you worry that your food would run out before you got money to buy more?: No     Within the past 12 months, did the food you bought just not last and you didn t have money to get more?: Yes   Transportation Needs: Low Risk  (12/27/2023)    Transportation Needs     Within the past 12 months, has lack of transportation kept you from medical appointments, getting your medicines, non-medical meetings or appointments, work, or from getting things that you need?: No   Physical Activity: Not on file   Stress: Not on file   Social Connections: Not on file   Interpersonal Safety: Low Risk  (1/7/2025)    Interpersonal Safety     Do you feel physically and emotionally safe where you currently live?: Yes     Within the past 12 months, have you been hit, slapped, kicked or otherwise physically hurt by someone?: No     Within the past 12 months, have you been humiliated or emotionally abused in other ways by your partner or ex-partner?: No   Housing Stability: Low Risk  (12/27/2023)    Housing Stability     Do you have housing? : Yes     Are you worried about losing your housing?: No       Past Surgical History:   Procedure Laterality Date    ENDOSCOPY UPPER, COLONOSCOPY, COMBINED N/A 9/11/2024    Procedure: COLONOSCOPY, WITH UPPER GASTROINTESTINAL TRACT ENDOSCOPY WITH BIOPSY;  Surgeon: Arjun Ackerman MD;  Location: [facility]    ESOPHAGOSCOPY, GASTROSCOPY, DUODENOSCOPY (EGD), COMBINED N/A 10/28/2024    Procedure: ESOPHAGOGASTRODUODENOSCOPY with biopsy;  Surgeon: Arjun Ackerman MD;  Location: [facility]       Family History   Problem Relation Age of Onset    Obesity Mother     Cerebrovascular Disease Mother     Diabetes No family hx of     Coronary Artery Disease No family hx of     Hypertension No family hx of     Hyperlipidemia No  "family hx of     Breast Cancer No family hx of     Colon Cancer No family hx of     Prostate Cancer No family hx of     Anesthesia Reaction No family hx of     Asthma No family hx of     Genetic Disorder No family hx of     Thyroid Disease No family hx of        Allergies:  Allergies as of 01/07/2025    (No Known Allergies)       Current Medications:  Current Outpatient Medications   Medication Sig Dispense Refill    omeprazole (PRILOSEC) 40 MG DR capsule Take 1 capsule (40 mg) by mouth 2 times daily. 60 capsule 3    diphenhydrAMINE (BENADRYL) 50 MG capsule Take 50 mg by mouth every 6 hours as needed for itching or allergies. (Patient not taking: Reported on 1/7/2025)      ibuprofen (ADVIL/MOTRIN) 200 MG tablet Take 200 mg by mouth every 4 hours as needed for mild pain (Patient not taking: Reported on 1/7/2025)          Review Of Systems:  A complete review of systems is negative except for the above mentioned items in the interval history.     Physical Exam:  /74 (BP Location: Right arm, Patient Position: Sitting, Cuff Size: Adult Large)   Pulse 78   Temp 98.1  F (36.7  C) (Tympanic)   Ht 1.753 m (5' 9\")   Wt 103.4 kg (227 lb 15.3 oz)   SpO2 98%   BMI 33.66 kg/m    GENERAL APPEARANCE: Healthy, alert and in no acute distress.  HEENT: Eyes appear normal without scleral icterus. Extraocular movements intact.   NECK: Supple with normal range of motion. No asymmetry or masses.  LYMPHATICS: One firm node the size of eraser posterior left neck, 3-4 mm in size. No other palpable adenopathy.   RESP: Lungs clear to auscultation bilaterally, respirations regular and easy.  CARDIOVASCULAR: Regular rate and rhythm. Normal S1, S2; no murmur, gallop, or rub.  ABDOMEN: Soft, non-tender, non-distended. Bowel sounds auscultated all 4 quadrants. No palpable organomegaly or masses.  BREAST: Right breast has a 1x2 mm linear bump (light brown in color) on the most anterior portion of areola, no underlying masses. "   MUSCULOSKELETAL: Extremities without gross deformities noted.   NEURO: Alert and oriented x 3.  Gait steady.  PSYCHIATRIC: Mentation and affect appear normal.  Mood appropriate.    Laboratory/Imaging Studies:    Component      Latest Ref Rng 12/31/2024  3:05 PM   % Neutrophils      % 27    % Lymphocytes      % 66    % Monocytes      % 4    % Eosinophils      % 3    % Basophils      % 0    Absolute Neutrophil      1.6 - 8.3 10e3/uL 4.4    Absolute Lymphocytes      0.8 - 5.3 10e3/uL 10.8 (H)    Absolute Monocytes      0.0 - 1.3 10e3/uL 0.7    Absolute Eosinophils      0.0 - 0.7 10e3/uL 0.5    Absolute Basophils      0.0 - 0.2 10e3/uL 0.0    RBC Morphology Confirmed RBC Indices    Platelet Morphology      Automated Count Confirmed. Platelet morphology is normal.  Automated Count Confirmed. Giant platelets are present. !    Acanthocytes      None Seen  Slight !    Sander Cells      None Seen  Slight !    Smudge Cells      None Seen  Present !    Stomatocytes      None Seen  Slight !    Giant Platelets      None Seen  Slight !    Sodium      135 - 145 mmol/L 139    Potassium      3.4 - 5.3 mmol/L 4.1    Carbon Dioxide (CO2)      22 - 29 mmol/L 22    Anion Gap      7 - 15 mmol/L 14    Urea Nitrogen      6.0 - 20.0 mg/dL 11.7    Creatinine      0.67 - 1.17 mg/dL 0.92    GFR Estimate      >60 mL/min/1.73m2 >90    Calcium      8.8 - 10.4 mg/dL 9.0    Chloride      98 - 107 mmol/L 103    Glucose      70 - 99 mg/dL 102 (H)    Alkaline Phosphatase      40 - 150 U/L 76    AST      0 - 45 U/L 26    ALT      0 - 70 U/L 35    Protein Total      6.4 - 8.3 g/dL 6.9    Albumin      3.5 - 5.2 g/dL 4.4    Bilirubin Total      <=1.2 mg/dL 0.6    WBC      4.0 - 11.0 10e3/uL 16.3 (H)    RBC Count      4.40 - 5.90 10e6/uL 5.27    Hemoglobin      13.3 - 17.7 g/dL 15.6    Hematocrit      40.0 - 53.0 % 46.0    MCV      78 - 100 fL 87    MCH      26.5 - 33.0 pg 29.6    MCHC      31.5 - 36.5 g/dL 33.9    RDW      10.0 - 15.0 % 14.0    Platelet  Count      150 - 450 10e3/uL 193    Lactate Dehydrogenase      0 - 250 U/L 203       Legend:  (H) High  ! Abnormal    PROCEDURE: CT CHEST W/O CONTRAST 7/12/2024 2:37 PM     HISTORY: hx pulmonary nodes, hx CLL; CLL (chronic lymphocytic  leukemia) (H); Pulmonary nodules     COMPARISONS: 8/25/2022     Meds/Dose Given:     TECHNIQUE: CT scan of the chest without IV contrast sagittal coronal  reconstructions were obtained     FINDINGS: There are several pulmonary nodules that are all stable and  unchanged from previous examination in 2022. No new pulmonary nodules  or infiltrates are seen. The hilar and mediastinal lymph nodes are  normal in caliber. Axillary and supraclavicular lymph nodes appear  normal.     The upper portion of the liver spleen and pancreas appear normal. The  adrenal glands are normal. Scheuermann's changes are present in the  thoracic spine.                                                                        IMPRESSION: Multiple small pulmonary nodules without change from 2022     ERNESTINE CORRALES MD     PROCEDURE: US HEAD NECK SOFT TISSUE 7/12/2024 2:32 PM     HISTORY: hx cll, ongoing lymphadenopathy of the posterior left  cervical chain, surviellance; CLL (chronic lymphocytic leukemia) (H);  Lymphadenopathy     COMPARISONS: 12/29/2023     TECHNIQUE: Ultrasound of the left posterior neck     FINDINGS: There is a subcutaneous nodule which is stable and unchanged  from previous examination.                                                                        IMPRESSION: Nonspecific subcutaneous nodule without change.     ERNESTINE CORRALES MD        ASSESSMENT/PLAN:    1.Stage 0 chronic lymphocytic leukemia Has been followed in surveillance. [place] Stage 0. Stable cervical node in left neck. No new changes. Labs stable. We will continue surveillance. I will see him back in 6 months with labs prior.     #2 Hx pulmonary nodes Most recent scan 2022. Hx smoking, hx exposure in [place]. Updated CT  scan since last visit and pulmonary nodules are stable compared to 2022.     #3 Skin concern Patient does have a 1x2 mm skin lesion along top of areola. No change. No underlying mass. I truly think this is a skin concern but offered ultrasound. Declined. Offered referral to Derm. Declined. Due for visit with PCP so will schedule visit with her.     #4 Drainage Recent EGD showed esophagitis, started PPI. No change. Still feels constant drainage and need to clear throat. Will be seeing ENT next week.     Patient in agreement with plan and verbalizes understanding. Agrees to call with any questions or concerns.    33 minutes spent in the patient's encounter today with time spent in review of patient's chart along with chart preparation and review of the treatment plan and signing of treatment plan.  Time was also spent with the patient in obtaining a review of systems and performing a physical exam along with detailed review of all test results.  Time was also spent in discussing plan for future follow-up and relating instructions for follow-up and in placing future orders.    The longitudinal plan of care for the diagnosis(es)/condition(s) as documented were addressed during this visit. Due to the added complexity in care, I will continue to support Branden in the subsequent management and with ongoing continuity of care.      NATALIYA Kwon, FNP-BC

## 2025-01-07 NOTE — NURSING NOTE
"Oncology Rooming Note    January 7, 2025 3:13 PM   Branden Becker is a 54 year old male who presents for:    Chief Complaint   Patient presents with    Oncology Clinic Visit     Follow. CLL (chronic lymphocytic leukemia)      Initial Vitals: /74 (BP Location: Right arm, Patient Position: Sitting, Cuff Size: Adult Large)   Pulse 78   Temp 98.1  F (36.7  C) (Tympanic)   Ht 1.753 m (5' 9\")   Wt 103.4 kg (227 lb 15.3 oz)   SpO2 98%   BMI 33.66 kg/m   Estimated body mass index is 33.66 kg/m  as calculated from the following:    Height as of this encounter: 1.753 m (5' 9\").    Weight as of this encounter: 103.4 kg (227 lb 15.3 oz). Body surface area is 2.24 meters squared.  No Pain (0) Comment: Data Unavailable   No LMP for male patient.  Allergies reviewed: Yes  Medications reviewed: Yes    Medications: Medication refills not needed today.  Pharmacy name entered into [facility]:    [facility] [address] [place], [place] - [address] [facility] [address]  [facility] - [place], [place] - [address] [place] [address] [facility] - [place], [place] - [address] [facility]    Frailty Screening:   Is the patient here for a new oncology consult visit in cancer care? 2. No      Clinical concerns: none       Adeline Page LPN              "

## 2025-01-28 ENCOUNTER — OFFICE VISIT (OUTPATIENT)
Dept: OTOLARYNGOLOGY | Facility: OTHER | Age: 55
End: 2025-01-28
Attending: NURSE PRACTITIONER
Payer: COMMERCIAL

## 2025-01-28 VITALS
OXYGEN SATURATION: 95 % | DIASTOLIC BLOOD PRESSURE: 80 MMHG | TEMPERATURE: 97.6 F | BODY MASS INDEX: 33.62 KG/M2 | HEIGHT: 69 IN | HEART RATE: 68 BPM | WEIGHT: 227 LBS | SYSTOLIC BLOOD PRESSURE: 120 MMHG

## 2025-01-28 DIAGNOSIS — J34.89 SINUS DRAINAGE: Primary | ICD-10-CM

## 2025-01-28 RX ORDER — AZELASTINE 1 MG/ML
2 SPRAY, METERED NASAL 2 TIMES DAILY
Qty: 30 ML | Refills: 12 | Status: SHIPPED | OUTPATIENT
Start: 2025-01-28

## 2025-01-28 ASSESSMENT — PAIN SCALES - GENERAL: PAINLEVEL_OUTOF10: NO PAIN (0)

## 2025-01-28 NOTE — PROGRESS NOTES
Otolaryngology Note         Chief Complaint:     Patient presents with:  Sinus Problem: Rhinorrhea           History of Present Illness:     Branden Becker is a 54 year old male seen today for concerns for recurrent sinus drainage.  He can feel PND and globus sensation with frequent throat clearing.      Occasional nasal congestion  Tried mucinex, nasal spray - flonase, saline  No sore throats  No trouble swallowing  No voice change  No ear pain  No unintentional weight loss.    No facial pain or pressure  He doesn't feel any significant allergies,   Son has significant allergies  No previous allergy testing.      EGD completed ***    History of stage 0 CLL and lung nodules.  Follows with oncology for monitoring of CLL.  Last seen in oncology in 1/7/2025 for routine surveillance visit.  Known stable cervical node in the left neck, monitored by oncology.  Labs are stable.    CT soft tissue neck completed 10/7/2023:  FINDINGS: There is some mucosal thickening seen in the left sphenoid  sinus. There is a polyp or retention cyst seen in the floor the right  maxillary sinus. The muscles of mastication and the parapharyngeal  spaces are normal. The salivary glands are normal. There is some  tonsillar enlargement bilaterally. There are enlarged cervical lymph  nodes noted bilaterally. The larynx and upper trachea are normal.  Upper mediastinal lymph nodes are normal in caliber and the lung  apices are clear.                                                                        IMPRESSION: Enlarged tonsils and cervical lymph nodes bilaterally    PET oncology eyes to thighs completed 1/20/2021:                                                               IMPRESSION:   No evidence of abnormal FDG uptake.     Stable appearance of small nodules within the lungs.   In a patient greater than 35 years of age with no history of cancer,  the Fleischner Society 2017 guidelines for a single nodule or numerous  nodules smaller than  6 mm in mean diameter states the following:     Low risk patients*: No additional follow-up.     High risk patients**: Optional CT in 12 months.     Symptoms include ***  Symptoms have been present for *** and are ***.  Treatments have included: ***.     He does snore, no CHON - previous testing nega     Sleeping - ***  Snoring - ***% of the time  Apnea - ***  Struggle breathing - ***  Mouth breather- ***  Wakes - ***  Daytime sleepiness - ***  Behaviors - ***  Easily distracted - ***  Patient ***  hearing screening  Patient has *** history of ear surgeries or procedures  Chronic Rhinorrhea - ***  Recurrent tonsillitis - ***  Missed school/ ***    No chronic cough  No wheezing  He is coughing up occasional phlegm  Tenacious sputum    *** shortness of breath      No history of sinus injury/trauma/surgery  *** dysphagia  Rash/sensitive skin - no  *** history of seasonal allergies  *** history of previous allergy testing  *** asthma  No family history of allergies, tonsillitis      Resides in a house with a basement. There is no water or mold.  There is not carpet in the bedroom.    Heat source in home: baseboard  Pets: cat         Medications:     Current Outpatient Rx   Medication Sig Dispense Refill    omeprazole (PRILOSEC) 40 MG DR capsule Take 1 capsule (40 mg) by mouth 2 times daily. 60 capsule 3    diphenhydrAMINE (BENADRYL) 50 MG capsule Take 50 mg by mouth every 6 hours as needed for itching or allergies. (Patient not taking: Reported on 2025)      ibuprofen (ADVIL/MOTRIN) 200 MG tablet Take 200 mg by mouth every 4 hours as needed for mild pain (Patient not taking: Reported on 2025)              Allergies:     Allergies: Patient has no known allergies.          Past Medical History:     Past Medical History:   Diagnosis Date    Arthritis     Cancer (H)     GERD (gastroesophageal reflux disease)     2016 - EGD without barretts changes.            Past Surgical History:     Past Surgical  "History:   Procedure Laterality Date    ENDOSCOPY UPPER, COLONOSCOPY, COMBINED N/A 2024    Procedure: COLONOSCOPY, WITH UPPER GASTROINTESTINAL TRACT ENDOSCOPY WITH BIOPSY;  Surgeon: Arjun Ackerman MD;  Location: [facility]    ESOPHAGOSCOPY, GASTROSCOPY, DUODENOSCOPY (EGD), COMBINED N/A 10/28/2024    Procedure: ESOPHAGOGASTRODUODENOSCOPY with biopsy;  Surgeon: Arjun Ackerman MD;  Location: [facility] OR       ENT family history reviewed         Social History:     Social History     Tobacco Use    Smoking status: Former     Current packs/day: 0.00     Types: Cigarettes     Quit date: 2006     Years since quittin.0    Smokeless tobacco: Never   Vaping Use    Vaping status: Never Used   Substance Use Topics    Alcohol use: Yes     Comment: a case a year at the most.     Drug use: Never            Review of Systems:     ROS: See HPI         Physical Exam:     /80 (BP Location: Right arm, Patient Position: Sitting, Cuff Size: Adult Large)   Pulse 68   Temp 97.6  F (36.4  C) (Tympanic)   Ht 1.753 m (5' 9\")   Wt 103 kg (227 lb)   SpO2 95%   BMI 33.52 kg/m      General - The patient is well nourished and well developed, and appears to have good nutritional status.  Cooperates with exam ***  Head and Face - Normocephalic and atraumatic, with no gross asymmetry noted.  The facial nerve is intact, with strong symmetric movements.  Voice and Breathing - The patient was breathing comfortably without the use of accessory muscles. There was no wheezing, stridor, or stertor.  The patients voice was  *** clear and strong, and had appropriate pitch and quality.  Ears -The external auditory canals are patent, the tympanic membranes are intact without effusion, retraction or mass.  Bony landmarks are intact.  Eyes - Extraocular movements intact, sclera were not icteric or injected, conjunctiva were pink and moist.  Mouth - Examination of the oral cavity showed pink, healthy oral mucosa. No lesions or " ulcerations noted.  The tongue was mobile and midline, and the dentition were in *** condition.    Throat - The walls of the oropharynx were smooth, pink, moist, symmetric, and had no lesions or ulcerations.  The tonsillar pillars and soft palate were symmetric.  The uvula was midline on elevation.  Tonsils grade 1 bilaterally  Neck - *** worrisome lymphadenopathy.   Palpation of the thyroid was soft and smooth, with no nodules or goiter appreciated.  The trachea was mobile and midline. Left posterior midline lymph node   Nose - External contour is symmetric, no gross deflection or scars.  The nasal passages are examined with nasal speculum.   Nasal mucosa is pink and moist with no abnormal mucus.  The septum was intact and the turbinates are examined with nasal speculum, *** polyps, masses, or purulence noted on examination of anterior nasal cavity.   Mirror visualization of the adenoids ***    Attempts at mirror laryngoscopy were not possible due to gag reflex.  Therefore I proceeded with a fiberoptic examination after informed consent.  First I sprayed both sides of the nose with a mixture of lidocaine and neosynephrine.  I then passed the scope through the nasal cavity.     Nasal cavity remarkable for allergic mucin.  DNS th  Lingual tonsil hypertrophy    The nasopharynx was mucosally covered and symmetric.  The eustachian tube openings were unobstructed.  Going further down I had a clear view of the base of tongue which had normal appearing lingual tonsillar tissue.  The base of tongue was free of lesions, and the vallecula was open.  The epiglottis was smooth and mucosally covered.  The supraglottic larynx was then clearly visualized.  The vocal cords moved smoothly and symmetrically and were pearly white.  The pyriform sinuses were open and without trey mass or pooling of secretions upon valsalva, and the limited view of the postcricoid region did not show any lesions.  The patient tolerated the procedure  well.            Assessment and Plan:       ICD-10-CM    1. Sinus drainage  J34.89 lidocaine 2%-oxymetazoline 0.025% nasal solution 2 spray          Indications for allergy testing include:    1) Confirm suspicion of allergic rhinitis due to inhalant allergies  2) Identify the offending allergen to determine specific mode of treatment  3) In the case of chronic rhinosinusitis: when symptoms are not controlled by avoidance and pharmacotherapy  4) In the Asthma patient when exacerbations may be due to perennial allergen exposure  5) Suspect food allergy  6) Otitis Media, chronic rhinitis, atopic dermatitis, Meniere disease, headache, pharyngitis or eye symptoms      Modified quantitative testing (MQT) will be performed.  Signed consent was obtained, and the risks of immunotherapy were discussed, including the potential for anaphylaxis.     If immunotherapy (IT) is recommended, there is continued risk of anaphylaxis.   Anaphylaxis can cause death. The patient will need to be monitored for 30 minutes post injection.  They must present their epinephrine pen prior to injection.  Subcutaneous as well as sublingual immunotherapy (SLIT) were discussed as potential treatment options.  The patient was told SLIT is not approved by the FDA and is cash pay.  The general time frame of immunotherapy was discussed (generally 3-5 years, sometimes longer), and the basic immunology behind IT was discussed.      Maryam HAIRSTON  [facility]

## 2025-01-28 NOTE — PATIENT INSTRUCTIONS
Cetirizine 10 mg daily   Astelin nasal spray 2 sprays both nostrils twice daily   Complete allergy testing, we will call you with results in about 2 weeks    Thank you for allowing Maryam Pringle and our ENT team to participate in your care.  If your medications are too expensive, please give the nurse a call.  We can possibly change this medication.  If you have a scheduling or an appointment question please contact our Health Unit Coordinator at their direct line [phone] ext [phone].   ALL nursing questions or concerns can be directed to your ENT nurse at: [phone] - Angelika

## 2025-01-28 NOTE — Clinical Note
1/28/2025      Branden Becker  [address] [facility] [address]      Dear Colleague,    Thank you for referring your patient, Branden Becker, to the [facility]. Please see a copy of my visit note below.    Otolaryngology Note         Chief Complaint:     Patient presents with:  Sinus Problem: Rhinorrhea           History of Present Illness:     Branden Becker is a 54 year old male seen today for ***    History of stage 0 CLL and lung nodules.  Follows with oncology for monitoring of CLL.  Last seen in oncology in 1/7/2025 for routine surveillance visit.  Known stable cervical node in the left neck, monitored by oncology.  Labs are stable.    CT soft tissue neck completed 10/7/2023:  FINDINGS: There is some mucosal thickening seen in the left sphenoid  sinus. There is a polyp or retention cyst seen in the floor the right  maxillary sinus. The muscles of mastication and the parapharyngeal  spaces are normal. The salivary glands are normal. There is some  tonsillar enlargement bilaterally. There are enlarged cervical lymph  nodes noted bilaterally. The larynx and upper trachea are normal.  Upper mediastinal lymph nodes are normal in caliber and the lung  apices are clear.                                                                        IMPRESSION: Enlarged tonsils and cervical lymph nodes bilaterally    PET oncology eyes to thighs completed 1/20/2021:                                                               IMPRESSION:   No evidence of abnormal FDG uptake.     Stable appearance of small nodules within the lungs.   In a patient greater than 35 years of age with no history of cancer,  the Fleischner Society 2017 guidelines for a single nodule or numerous  nodules smaller than 6 mm in mean diameter states the following:     Low risk patients*: No additional follow-up.     High risk patients**: Optional CT in 12 months.     Symptoms include ***  Symptoms have been present for *** and  are ***.  Treatments have included: ***.     Sleeping - ***  Snoring - ***% of the time  Apnea - ***  Struggle breathing - ***  Mouth breather- ***  Wakes - ***  Daytime sleepiness - ***  Behaviors - ***  Easily distracted - ***  Patient ***  hearing screening  Patient has *** history of ear surgeries or procedures  Chronic Rhinorrhea - ***  Recurrent tonsillitis - ***  Missed school/ ***    *** chronic cough  *** shortness of breath      *** history of sinus injury/trauma/surgery  *** dysphagia  Rash/sensitive skin - ***  *** history of seasonal allergies  *** history of previous allergy testing  *** asthma  *** family history of allergies, tonsillitis      Resides in *** with*** basement. Age of home ***.  There is *** water or mold.  There *** carpet in the bedroom.    Heat source in home: ***  Pets: ***         Medications:     Current Outpatient Rx   Medication Sig Dispense Refill    diphenhydrAMINE (BENADRYL) 50 MG capsule Take 50 mg by mouth every 6 hours as needed for itching or allergies. (Patient not taking: Reported on 2025)      ibuprofen (ADVIL/MOTRIN) 200 MG tablet Take 200 mg by mouth every 4 hours as needed for mild pain (Patient not taking: Reported on 2025)      omeprazole (PRILOSEC) 40 MG DR capsule Take 1 capsule (40 mg) by mouth 2 times daily. 60 capsule 3            Allergies:     Allergies: Patient has no known allergies.          Past Medical History:     Past Medical History:   Diagnosis Date    Arthritis     Cancer (H)     GERD (gastroesophageal reflux disease)     2016 - EGD without barretts changes.            Past Surgical History:     Past Surgical History:   Procedure Laterality Date    ENDOSCOPY UPPER, COLONOSCOPY, COMBINED N/A 2024    Procedure: COLONOSCOPY, WITH UPPER GASTROINTESTINAL TRACT ENDOSCOPY WITH BIOPSY;  Surgeon: Arjun Ackerman MD;  Location: [facility]    ESOPHAGOSCOPY, GASTROSCOPY, DUODENOSCOPY (EGD), COMBINED N/A 10/28/2024    Procedure:  ESOPHAGOGASTRODUODENOSCOPY with biopsy;  Surgeon: Arjun Ackerman MD;  Location: [facility]       ENT family history reviewed         Social History:     Social History     Tobacco Use    Smoking status: Former     Current packs/day: 0.00     Types: Cigarettes     Quit date: 2006     Years since quittin.0    Smokeless tobacco: Never   Vaping Use    Vaping status: Never Used   Substance Use Topics    Alcohol use: Yes     Comment: a case a year at the most.     Drug use: Never            Review of Systems:     ROS: See HPI         Physical Exam:     ***  General - The patient is well nourished and well developed, and appears to have good nutritional status.  Cooperates with exam ***  Head and Face - Normocephalic and atraumatic, with no gross asymmetry noted.  The facial nerve is intact, with strong symmetric movements.  Voice and Breathing - The patient was breathing comfortably without the use of accessory muscles. There was no wheezing, stridor, or stertor.  The patients voice was  *** clear and strong, and had appropriate pitch and quality.  Ears -The external auditory canals are patent, the tympanic membranes are intact without effusion, retraction or mass.  Bony landmarks are intact.  Eyes - Extraocular movements intact, sclera were not icteric or injected, conjunctiva were pink and moist.  Mouth - Examination of the oral cavity showed pink, healthy oral mucosa. No lesions or ulcerations noted.  The tongue was mobile and midline, and the dentition were in *** condition.    Throat - The walls of the oropharynx were smooth, pink, moist, symmetric, and had no lesions or ulcerations.  The tonsillar pillars and soft palate were symmetric.  The uvula was midline on elevation.  Tonsils grade ***  Neck - *** worrisome lymphadenopathy.   Palpation of the thyroid was soft and smooth, with no nodules or goiter appreciated.  The trachea was mobile and midline.  Nose - External contour is symmetric, no gross  deflection or scars.  The nasal passages are examined with nasal speculum.   Nasal mucosa is pink and moist with no abnormal mucus.  The septum was intact and the turbinates are examined with nasal speculum, *** polyps, masses, or purulence noted on examination of anterior nasal cavity.   Mirror visualization of the adenoids ***    To evaluate the nose and sinuses, I performed rigid nasal endoscopy.  I sprayed both nares with 2 sprays lidocaine and neosynephrine.     I began with the RIGHT side using a 0 degree rigid nasal endoscope, and then similarly examined the LEFT side     Findings:  ***  Inferior turbinates:  ***  Middle turbinate and middle meatus:  ***  Superior meatus is examined and unremarkable  *** Sphenoethmoidal purulence  Mucosa is ***,  no polyps nor polypoid degeneration  Nasopharynx - Adenoid ***  The patient tolerated the procedure well            Assessment and Plan:     No diagnosis found.    Indications for allergy testing include:    1) Confirm suspicion of allergic rhinitis due to inhalant allergies  2) Identify the offending allergen to determine specific mode of treatment  3) In the case of chronic rhinosinusitis: when symptoms are not controlled by avoidance and pharmacotherapy  4) In the Asthma patient when exacerbations may be due to perennial allergen exposure  5) Suspect food allergy  6) Otitis Media, chronic rhinitis, atopic dermatitis, Meniere disease, headache, pharyngitis or eye symptoms      Modified quantitative testing (MQT) will be performed.  Signed consent was obtained, and the risks of immunotherapy were discussed, including the potential for anaphylaxis.     If immunotherapy (IT) is recommended, there is continued risk of anaphylaxis.   Anaphylaxis can cause death. The patient will need to be monitored for 30 minutes post injection.  They must present their epinephrine pen prior to injection.  Subcutaneous as well as sublingual immunotherapy (SLIT) were discussed as  potential treatment options.  The patient was told SLIT is not approved by the FDA and is cash pay.  The general time frame of immunotherapy was discussed (generally 3-5 years, sometimes longer), and the basic immunology behind IT was discussed.      Maryam HAIRSTON  [facility]          Again, thank you for allowing me to participate in the care of your patient.        Sincerely,        Maryam Pringle NP    Electronically signed

## 2025-02-01 ENCOUNTER — HEALTH MAINTENANCE LETTER (OUTPATIENT)
Age: 55
End: 2025-02-01

## 2025-02-17 LAB — SCANNED LAB RESULT: NORMAL

## 2025-07-03 ENCOUNTER — LAB (OUTPATIENT)
Dept: LAB | Facility: OTHER | Age: 55
End: 2025-07-03
Payer: COMMERCIAL

## 2025-07-03 DIAGNOSIS — C91.10 CLL (CHRONIC LYMPHOCYTIC LEUKEMIA) (H): ICD-10-CM

## 2025-07-03 LAB
ALBUMIN SERPL BCG-MCNC: 4.2 G/DL (ref 3.5–5.2)
ALP SERPL-CCNC: 73 U/L (ref 40–150)
ALT SERPL W P-5'-P-CCNC: 33 U/L (ref 0–70)
ANION GAP SERPL CALCULATED.3IONS-SCNC: 12 MMOL/L (ref 7–15)
AST SERPL W P-5'-P-CCNC: 25 U/L (ref 0–45)
BASOPHILS # BLD MANUAL: 0.3 10E3/UL (ref 0–0.2)
BASOPHILS NFR BLD MANUAL: 2 %
BILIRUB SERPL-MCNC: 0.5 MG/DL
BUN SERPL-MCNC: 15.7 MG/DL (ref 6–20)
CALCIUM SERPL-MCNC: 8.8 MG/DL (ref 8.8–10.4)
CHLORIDE SERPL-SCNC: 107 MMOL/L (ref 98–107)
CREAT SERPL-MCNC: 0.96 MG/DL (ref 0.67–1.17)
EGFRCR SERPLBLD CKD-EPI 2021: >90 ML/MIN/1.73M2
EOSINOPHIL # BLD MANUAL: 0 10E3/UL (ref 0–0.7)
EOSINOPHIL NFR BLD MANUAL: 0 %
ERYTHROCYTE [DISTWIDTH] IN BLOOD BY AUTOMATED COUNT: 14.4 % (ref 10–15)
GLUCOSE SERPL-MCNC: 99 MG/DL (ref 70–99)
HCO3 SERPL-SCNC: 22 MMOL/L (ref 22–29)
HCT VFR BLD AUTO: 45.5 % (ref 40–53)
HGB BLD-MCNC: 15.5 G/DL (ref 13.3–17.7)
LDH SERPL L TO P-CCNC: 219 U/L (ref 0–250)
LYMPHOCYTES # BLD MANUAL: 11.7 10E3/UL (ref 0.8–5.3)
LYMPHOCYTES NFR BLD MANUAL: 71 %
MCH RBC QN AUTO: 30.2 PG (ref 26.5–33)
MCHC RBC AUTO-ENTMCNC: 34.1 G/DL (ref 31.5–36.5)
MCV RBC AUTO: 89 FL (ref 78–100)
MONOCYTES # BLD MANUAL: 0.3 10E3/UL (ref 0–1.3)
MONOCYTES NFR BLD MANUAL: 2 %
NEUTROPHILS # BLD MANUAL: 4.1 10E3/UL (ref 1.6–8.3)
NEUTROPHILS NFR BLD MANUAL: 25 %
PLAT MORPH BLD: NORMAL
PLATELET # BLD AUTO: 199 10E3/UL (ref 150–450)
POTASSIUM SERPL-SCNC: 4.3 MMOL/L (ref 3.4–5.3)
PROT SERPL-MCNC: 6.8 G/DL (ref 6.4–8.3)
RBC # BLD AUTO: 5.14 10E6/UL (ref 4.4–5.9)
RBC MORPH BLD: NORMAL
SODIUM SERPL-SCNC: 141 MMOL/L (ref 135–145)
WBC # BLD AUTO: 16.5 10E3/UL (ref 4–11)

## 2025-07-03 PROCEDURE — 85007 BL SMEAR W/DIFF WBC COUNT: CPT

## 2025-07-03 PROCEDURE — 83615 LACTATE (LD) (LDH) ENZYME: CPT

## 2025-07-03 PROCEDURE — 80053 COMPREHEN METABOLIC PANEL: CPT

## 2025-07-03 PROCEDURE — 85027 COMPLETE CBC AUTOMATED: CPT

## 2025-07-03 PROCEDURE — 36415 COLL VENOUS BLD VENIPUNCTURE: CPT

## 2025-07-07 ENCOUNTER — ONCOLOGY VISIT (OUTPATIENT)
Dept: ONCOLOGY | Facility: OTHER | Age: 55
End: 2025-07-07
Attending: NURSE PRACTITIONER
Payer: COMMERCIAL

## 2025-07-07 VITALS
TEMPERATURE: 98 F | HEIGHT: 69 IN | OXYGEN SATURATION: 97 % | RESPIRATION RATE: 20 BRPM | WEIGHT: 229.94 LBS | DIASTOLIC BLOOD PRESSURE: 78 MMHG | HEART RATE: 73 BPM | SYSTOLIC BLOOD PRESSURE: 110 MMHG | BODY MASS INDEX: 34.06 KG/M2

## 2025-07-07 DIAGNOSIS — C91.10 CLL (CHRONIC LYMPHOCYTIC LEUKEMIA) (H): Primary | ICD-10-CM

## 2025-07-07 ASSESSMENT — PAIN SCALES - GENERAL: PAINLEVEL_OUTOF10: NO PAIN (0)

## 2025-07-07 NOTE — NURSING NOTE
"Oncology Rooming Note    July 7, 2025 3:18 PM   Branden Becker is a 54 year old male who presents for:    Chief Complaint   Patient presents with    Oncology Clinic Visit     Follow up CLL (chronic lymphocytic leukemia)        Initial Vitals: /78   Pulse 73   Temp 98  F (36.7  C) (Tympanic)   Resp 20   Ht 1.753 m (5' 9\")   Wt 104.3 kg (229 lb 15 oz)   SpO2 97%   BMI 33.96 kg/m   Estimated body mass index is 33.96 kg/m  as calculated from the following:    Height as of this encounter: 1.753 m (5' 9\").    Weight as of this encounter: 104.3 kg (229 lb 15 oz). Body surface area is 2.25 meters squared.  No Pain (0) Comment: Data Unavailable   No LMP for male patient.  Allergies reviewed: Yes  Medications reviewed: Yes    Medications: Medication refills not needed today.  Pharmacy name entered into [facility]:    [facility] [address] [place], [place] - [address] [address]  [facility] - [address] [facility] - [place], [place] - [address] [facility]    Frailty Screening:   Is the patient here for a new oncology consult visit in cancer care? 2. No    PHQ9:  Did this patient require a PHQ9?: No            Olimpia De Santiago LPN             " Heterosexual

## 2025-07-07 NOTE — PROGRESS NOTES
Hematology Follow-up Visit    Reason for Visit:  Branden is a 54 year old gentleman with a diagnosis of CLL, who presents to the clinic today for routine follow-up.    Nursing Note and documentation reviewed: Yes    Provider Location: [facility]    Interval History:   Doing well. No recent or recurrent infections. No fever, chills, chest pain, cough, or SOB. No bleeding concerns. Appetite is good. Weight up a bit. No night sweats. No abdominal discomfort or early satiety. No change in left cervical node, no other nodes. Does continue to have a small skin lesion above right areola. Unchanged. Overall, no big changes or concerns.     History of Present Illness:     For full history, please see prior documentation of Dr. Erickson.     In short, the patient returns for followup of stage 0 chronic lymphocytic leukemia with history of lung nodules. He had seen the patient in consultation at the request of Dr. Vanessa Hastings on 09/24/2020. At that time, the patient was a 50-year-old white male with a history of tobacco abuse. Dr. Erickson was asked to evaluate concerning diagnosis of lymphocytosis and pulmonary nodules. He presented to the emergency room with right sided abdominal pain, was found to have ureteral stone with mild hydronephrosis. The patient was given IV fluids.     At that time, a CBC was drawn revealed a white count of 17.5 with H and H 15.6, 42.3, platelet count 241,000. Peripheral blood smear was ordered and was read as mild lymphocytosis, which could be associated with CLL or possibly well-differentiated lymphocytic lymphoma. The patient also had a CT chest done on 12/05/2020 which was read as multiple small noncalcified pulmonary nodules. It was mainly in the right and left lower lobes, all measured less than 6 mm. The patient did have a history of tobacco abuse in the past and quit 13 years ago. He was in [place] and was a war vet there and was in the [employer].     When we saw the patient, we wanted to rule out  CLL. We obtained peripheral blood for flow cytometry. This revealed the patient had 37% CD5 positive kappa monotypic cells. This was all consistent with CLL. We ordered a PET scan, was essentially negative. Lung nodules seen on previous CT were not PET avid. There was no lymphadenopathy or splenomegaly. When we saw the patient on 2021, we recommended a FISH panel to rule out 17p deletion. The patient was found to be 17p deletion negative; therefore, the prognosis was excellent and the plan was to watch and wait. He had a repeat CT chest on 2021 which was read as stable findings.     He has been followed in surveillance.     Current Treatment: None    Past Medical History:   Diagnosis Date    Arthritis     Cancer (H)     GERD (gastroesophageal reflux disease)     2016 - EGD without barretts changes.       Social History     Socioeconomic History    Marital status:      Spouse name: Maryam    Number of children: 6    Years of education: Not on file    Highest education level: Not on file   Occupational History    Not on file   Tobacco Use    Smoking status: Former     Current packs/day: 0.00     Types: Cigarettes     Quit date: 2006     Years since quittin.5    Smokeless tobacco: Never   Vaping Use    Vaping status: Never Used   Substance and Sexual Activity    Alcohol use: Yes     Comment: a case a year at the most.     Drug use: Never    Sexual activity: Yes     Partners: Female   Other Topics Concern    Parent/sibling w/ CABG, MI or angioplasty before 65F 55M? Not Asked   Social History Narrative    Preloaded 13     Social Drivers of Health     Financial Resource Strain: Low Risk  (2023)    Financial Resource Strain     Within the past 12 months, have you or your family members you live with been unable to get utilities (heat, electricity) when it was really needed?: No   Food Insecurity: High Risk (2023)    Food Insecurity     Within the past 12 months, did you worry  that your food would run out before you got money to buy more?: No     Within the past 12 months, did the food you bought just not last and you didn t have money to get more?: Yes   Transportation Needs: Low Risk  (12/27/2023)    Transportation Needs     Within the past 12 months, has lack of transportation kept you from medical appointments, getting your medicines, non-medical meetings or appointments, work, or from getting things that you need?: No   Physical Activity: Not on file   Stress: Not on file   Social Connections: Not on file   Interpersonal Safety: Low Risk  (7/7/2025)    Interpersonal Safety     Do you feel physically and emotionally safe where you currently live?: Yes     Within the past 12 months, have you been hit, slapped, kicked or otherwise physically hurt by someone?: No     Within the past 12 months, have you been humiliated or emotionally abused in other ways by your partner or ex-partner?: No   Housing Stability: Low Risk  (12/27/2023)    Housing Stability     Do you have housing? : Yes     Are you worried about losing your housing?: No       Past Surgical History:   Procedure Laterality Date    ENDOSCOPY UPPER, COLONOSCOPY, COMBINED N/A 9/11/2024    Procedure: COLONOSCOPY, WITH UPPER GASTROINTESTINAL TRACT ENDOSCOPY WITH BIOPSY;  Surgeon: Arjun Ackerman MD;  Location: [facility]    ESOPHAGOSCOPY, GASTROSCOPY, DUODENOSCOPY (EGD), COMBINED N/A 10/28/2024    Procedure: ESOPHAGOGASTRODUODENOSCOPY with biopsy;  Surgeon: Arjun Ackerman MD;  Location: [facility]       Family History   Problem Relation Age of Onset    Obesity Mother     Cerebrovascular Disease Mother     Diabetes No family hx of     Coronary Artery Disease No family hx of     Hypertension No family hx of     Hyperlipidemia No family hx of     Breast Cancer No family hx of     Colon Cancer No family hx of     Prostate Cancer No family hx of     Anesthesia Reaction No family hx of     Asthma No family hx of     Genetic Disorder  "No family hx of     Thyroid Disease No family hx of        Allergies:  Allergies as of 07/07/2025    (No Known Allergies)       Current Medications:  Current Outpatient Medications   Medication Sig Dispense Refill    omeprazole (PRILOSEC) 40 MG DR capsule Take 1 capsule (40 mg) by mouth 2 times daily. 60 capsule 3    azelastine (ASTELIN) 0.1 % nasal spray Spray 2 sprays into both nostrils 2 times daily. (Patient not taking: Reported on 7/7/2025) 30 mL 12    diphenhydrAMINE (BENADRYL) 50 MG capsule Take 50 mg by mouth every 6 hours as needed for itching or allergies. (Patient not taking: Reported on 1/7/2025)      ibuprofen (ADVIL/MOTRIN) 200 MG tablet Take 200 mg by mouth every 4 hours as needed for mild pain (Patient not taking: Reported on 1/7/2025)          Review Of Systems:  A complete review of systems is negative except for the above mentioned items in the interval history.     Physical Exam:  /78   Pulse 73   Temp 98  F (36.7  C) (Tympanic)   Resp 20   Ht 1.753 m (5' 9\")   Wt 104.3 kg (229 lb 15 oz)   SpO2 97%   BMI 33.96 kg/m    GENERAL APPEARANCE: Healthy, alert and in no acute distress.  HEENT: Eyes appear normal without scleral icterus. Extraocular movements intact.   NECK: Supple with normal range of motion. No asymmetry or masses.  LYMPHATICS: One firm node the size of eraser posterior left neck, 3-4 mm in size. No other palpable adenopathy.   RESP: Lungs clear to auscultation bilaterally, respirations regular and easy.  CARDIOVASCULAR: Regular rate and rhythm. Normal S1, S2; no murmur, gallop, or rub.  ABDOMEN: Soft, non-tender, non-distended. Bowel sounds auscultated all 4 quadrants. No palpable organomegaly or masses.  MUSCULOSKELETAL: Extremities without gross deformities noted.   NEURO: Alert and oriented x 3.  Gait steady.  PSYCHIATRIC: Mentation and affect appear normal.  Mood appropriate.    Laboratory/Imaging Studies:  Component      Latest Ref Rng 7/3/2025  3:03 PM   Sodium      " 135 - 145 mmol/L 141    Potassium      3.4 - 5.3 mmol/L 4.3    Carbon Dioxide (CO2)      22 - 29 mmol/L 22    Anion Gap      7 - 15 mmol/L 12    Urea Nitrogen      6.0 - 20.0 mg/dL 15.7    Creatinine      0.67 - 1.17 mg/dL 0.96    GFR Estimate      >60 mL/min/1.73m2 >90    Calcium      8.8 - 10.4 mg/dL 8.8    Chloride      98 - 107 mmol/L 107    Glucose      70 - 99 mg/dL 99    Alkaline Phosphatase      40 - 150 U/L 73    AST      0 - 45 U/L 25    ALT      0 - 70 U/L 33    Protein Total      6.4 - 8.3 g/dL 6.8    Albumin      3.5 - 5.2 g/dL 4.2    Bilirubin Total      <=1.2 mg/dL 0.5    % Neutrophils      % 25    % Lymphocytes      % 71    % Monocytes      % 2    % Eosinophils      % 0    % Basophils      % 2    Absolute Neutrophil      1.6 - 8.3 10e3/uL 4.1    Absolute Lymphocytes      0.8 - 5.3 10e3/uL 11.7 (H)    Absolute Monocytes      0.0 - 1.3 10e3/uL 0.3    Absolute Eosinophils      0.0 - 0.7 10e3/uL 0.0    Absolute Basophils      0.0 - 0.2 10e3/uL 0.3 (H)    WBC      4.0 - 11.0 10e3/uL 16.5 (H)    RBC Count      4.40 - 5.90 10e6/uL 5.14    Hemoglobin      13.3 - 17.7 g/dL 15.5    Hematocrit      40.0 - 53.0 % 45.5    MCV      78 - 100 fL 89    MCH      26.5 - 33.0 pg 30.2    MCHC      31.5 - 36.5 g/dL 34.1    RDW      10.0 - 15.0 % 14.4    Platelet Count      150 - 450 10e3/uL 199    RBC Morphology Confirmed RBC Indices    Platelet Morphology      Automated Count Confirmed. Platelet morphology is normal.  Automated Count Confirmed. Platelet morphology is normal.    Lactate Dehydrogenase      0 - 250 U/L 219       Legend:  (H) High    ASSESSMENT/PLAN:    1.Stage 0 chronic lymphocytic leukemia Has been followed in surveillance. B[place]rago Stage 0. Stable cervical node in left neck. No new changes. Labs stable. We will continue surveillance. I will see him back in 6 months with labs prior.     #2 Hx pulmonary nodes Most recent scan 2022. Hx smoking, hx exposure in [place]. Updated CT scan 07/24 shows stable  nodes wo change.      Patient in agreement with plan and verbalizes understanding. Agrees to call with any questions or concerns.    32 minutes spent in the patient's encounter today with time spent in review of patient's chart along with chart preparation and review of the treatment plan and signing of treatment plan.  Time was also spent with the patient in obtaining a review of systems and performing a physical exam along with detailed review of all test results.  Time was also spent in discussing plan for future follow-up and relating instructions for follow-up and in placing future orders.    The longitudinal plan of care for the diagnosis(es)/condition(s) as documented were addressed during this visit. Due to the added complexity in care, I will continue to support Branden in the subsequent management and with ongoing continuity of care.      NATALIYA Kwon, FNP-BC

## (undated) DEVICE — ENDO BITE BLOCK ADULT OLYMPUS LATEX FREE MAJ-1632

## (undated) DEVICE — CONNECTOR ERBEFLO 2 PORT 20325-215

## (undated) DEVICE — FORCEP COLON BIOPSY STD W/NEEDLE 160CM M00513390

## (undated) DEVICE — SUCTION MANIFOLD NEPTUNE 2 SYS 1 PORT 702-025-000

## (undated) DEVICE — SOL WATER IRRIG 1000ML BOTTLE 2F7114

## (undated) DEVICE — CANISTER SUCTION MEDI-VAC GUARDIAN 2000ML 90D 65651-220

## (undated) DEVICE — TUBING SUCTION 20FT N620A

## (undated) DEVICE — FORCEPS BIOPSY RADIAL JAW 4 LARGE W/NEEDLE 240CM M00513332

## (undated) DEVICE — JELLY LUBRICATING SURGILUBE 2OZ TUBE

## (undated) DEVICE — SYR 30ML SLIP TIP W/O NDL 302833